# Patient Record
Sex: FEMALE | Race: BLACK OR AFRICAN AMERICAN | Employment: FULL TIME | URBAN - METROPOLITAN AREA
[De-identification: names, ages, dates, MRNs, and addresses within clinical notes are randomized per-mention and may not be internally consistent; named-entity substitution may affect disease eponyms.]

---

## 2017-07-29 ENCOUNTER — HOSPITAL ENCOUNTER (EMERGENCY)
Facility: CLINIC | Age: 27
Discharge: HOME | End: 2017-07-29
Attending: EMERGENCY MEDICINE | Admitting: EMERGENCY MEDICINE
Payer: COMMERCIAL

## 2017-07-29 VITALS
DIASTOLIC BLOOD PRESSURE: 82 MMHG | HEIGHT: 62 IN | SYSTOLIC BLOOD PRESSURE: 129 MMHG | BODY MASS INDEX: 30.55 KG/M2 | RESPIRATION RATE: 18 BRPM | HEART RATE: 69 BPM | WEIGHT: 166 LBS | OXYGEN SATURATION: 97 % | TEMPERATURE: 98.5 F

## 2017-07-29 DIAGNOSIS — G51.0 BELL'S PALSY: ICD-10-CM

## 2017-07-29 DIAGNOSIS — G44.219 EPISODIC TENSION-TYPE HEADACHE, NOT INTRACTABLE: ICD-10-CM

## 2017-07-29 LAB
ANION GAP SERPL CALCULATED.3IONS-SCNC: 7 MMOL/L (ref 3–14)
BASOPHILS # BLD AUTO: 0 10E9/L (ref 0–0.2)
BASOPHILS NFR BLD AUTO: 0.9 %
BUN SERPL-MCNC: 10 MG/DL (ref 7–30)
CALCIUM SERPL-MCNC: 8.8 MG/DL (ref 8.5–10.1)
CHLORIDE SERPL-SCNC: 107 MMOL/L (ref 94–109)
CO2 SERPL-SCNC: 26 MMOL/L (ref 20–32)
CREAT SERPL-MCNC: 0.4 MG/DL (ref 0.52–1.04)
DIFFERENTIAL METHOD BLD: NORMAL
EOSINOPHIL # BLD AUTO: 0.1 10E9/L (ref 0–0.7)
EOSINOPHIL NFR BLD AUTO: 1.8 %
ERYTHROCYTE [DISTWIDTH] IN BLOOD BY AUTOMATED COUNT: 13 % (ref 10–15)
GFR SERPL CREATININE-BSD FRML MDRD: ABNORMAL ML/MIN/1.7M2
GLUCOSE SERPL-MCNC: 105 MG/DL (ref 70–99)
HCT VFR BLD AUTO: 37.4 % (ref 35–47)
HGB BLD-MCNC: 12.4 G/DL (ref 11.7–15.7)
IMM GRANULOCYTES # BLD: 0 10E9/L (ref 0–0.4)
IMM GRANULOCYTES NFR BLD: 0.2 %
LYMPHOCYTES # BLD AUTO: 1.7 10E9/L (ref 0.8–5.3)
LYMPHOCYTES NFR BLD AUTO: 37.3 %
MAGNESIUM SERPL-MCNC: 1.9 MG/DL (ref 1.6–2.3)
MCH RBC QN AUTO: 28 PG (ref 26.5–33)
MCHC RBC AUTO-ENTMCNC: 33.2 G/DL (ref 31.5–36.5)
MCV RBC AUTO: 84 FL (ref 78–100)
MONOCYTES # BLD AUTO: 0.2 10E9/L (ref 0–1.3)
MONOCYTES NFR BLD AUTO: 4.2 %
NEUTROPHILS # BLD AUTO: 2.5 10E9/L (ref 1.6–8.3)
NEUTROPHILS NFR BLD AUTO: 55.6 %
NRBC # BLD AUTO: 0 10*3/UL
NRBC BLD AUTO-RTO: 0 /100
PLATELET # BLD AUTO: 198 10E9/L (ref 150–450)
POTASSIUM SERPL-SCNC: 3.6 MMOL/L (ref 3.4–5.3)
RBC # BLD AUTO: 4.43 10E12/L (ref 3.8–5.2)
SODIUM SERPL-SCNC: 140 MMOL/L (ref 133–144)
WBC # BLD AUTO: 4.5 10E9/L (ref 4–11)

## 2017-07-29 PROCEDURE — 99283 EMERGENCY DEPT VISIT LOW MDM: CPT

## 2017-07-29 PROCEDURE — 36415 COLL VENOUS BLD VENIPUNCTURE: CPT | Performed by: EMERGENCY MEDICINE

## 2017-07-29 PROCEDURE — 80048 BASIC METABOLIC PNL TOTAL CA: CPT | Performed by: EMERGENCY MEDICINE

## 2017-07-29 PROCEDURE — 85025 COMPLETE CBC W/AUTO DIFF WBC: CPT | Performed by: EMERGENCY MEDICINE

## 2017-07-29 PROCEDURE — 83735 ASSAY OF MAGNESIUM: CPT | Performed by: EMERGENCY MEDICINE

## 2017-07-29 RX ORDER — VALACYCLOVIR HYDROCHLORIDE 1 G/1
1000 TABLET, FILM COATED ORAL 3 TIMES DAILY
Qty: 14 TABLET | Refills: 0 | Status: SHIPPED | OUTPATIENT
Start: 2017-07-29 | End: 2017-11-15

## 2017-07-29 RX ORDER — VALACYCLOVIR HYDROCHLORIDE 1 G/1
1000 TABLET, FILM COATED ORAL 3 TIMES DAILY
Qty: 7 TABLET | Refills: 0 | Status: SHIPPED | OUTPATIENT
Start: 2017-07-29 | End: 2017-07-30

## 2017-07-29 RX ORDER — KETOROLAC TROMETHAMINE 30 MG/ML
30 INJECTION, SOLUTION INTRAMUSCULAR; INTRAVENOUS ONCE
Status: DISCONTINUED | OUTPATIENT
Start: 2017-07-29 | End: 2017-07-29 | Stop reason: HOSPADM

## 2017-07-29 RX ORDER — PREDNISONE 10 MG/1
TABLET ORAL
Qty: 10 TABLET | Refills: 0 | Status: SHIPPED | OUTPATIENT
Start: 2017-08-04 | End: 2017-08-07

## 2017-07-29 RX ORDER — PREDNISONE 20 MG/1
TABLET ORAL
Qty: 15 TABLET | Refills: 0 | Status: SHIPPED | OUTPATIENT
Start: 2017-07-30 | End: 2017-08-03

## 2017-07-29 ASSESSMENT — ENCOUNTER SYMPTOMS
CONFUSION: 0
TROUBLE SWALLOWING: 0
BACK PAIN: 0
LIGHT-HEADEDNESS: 0
MYALGIAS: 1
NUMBNESS: 1
NECK STIFFNESS: 0
WEAKNESS: 1
NECK PAIN: 1
COUGH: 0
FACIAL SWELLING: 0
VOMITING: 0
SLEEP DISTURBANCE: 1
APPETITE CHANGE: 0
ABDOMINAL PAIN: 0
EYE REDNESS: 0
SORE THROAT: 0
EYE DISCHARGE: 1
EYE ITCHING: 0
DIARRHEA: 0
FEVER: 0
DIAPHORESIS: 0
NAUSEA: 1
HEADACHES: 1
FATIGUE: 1
DIZZINESS: 0
RHINORRHEA: 0
CHILLS: 0
EYE PAIN: 1
SINUS PRESSURE: 1

## 2017-07-29 NOTE — ED NOTES
A/O. VSS.Pt verbalized understanding of d/c instructions and ambulated to lobby steady gait.   Script rx prednisonex2 and valtrex given to pt at time of d/c

## 2017-07-29 NOTE — ED PROVIDER NOTES
History     Chief Complaint:  Right sided facial droop and headache    The history is provided by the patient.      Agustin Ortega is a 26 year old female with a history of hyperlipidemia who presents to the ED for right sided headache and facial droop. The patient states that she has been having a headache and generalized weakness for the past 2 days. She feels the headache once in a while on the left side but that it comes and goes and is not nearly as painful as the right side. The pain started in her head and now has radiated down her neck and into her right shoulder. With this, she saw her primary care doctor and was told she had a tension headache and since has been taking Ibuprofen and icing/heating the area to reduce pain though she notes it has not taken the headache away. She was unable to sleep last night due to the right shoulder pain and had to prop her arm up with a pillow while she was in bed. This morning, she noticed bilateral eye discharge. Then while at work, she started to become unable to fully open the right side of her mouth. Here in the ED, she has some numbness in her right check and mouth but is still able to feel touch. When applying pressure to the affected area, the pain will decrease for the time being but does not completely go away. She also notes that when she presses on the back of her head, she sometimes experiences pressure that radiates into her sinuses. Standing or walking around increases the amount of pain she experiences. She has been feeling nauseous and notes a mouth sore that was red and painful to touch that resolved about 3 days ago. She denies any confusion, coordination problems, gait difficulty, speech problems, fever, vomiting, or any other symptoms.    Allergies:  No known drug allergies    Medications:    Naprosyn  Prilosec  Vitamin E  Zofran  Omeprazole  Multiple Vitamins-Minerals  [place]-3 Fish Oil  Ibuprofen     Past Medical History:    GERD  "  Hyperlipidemia    Past Surgical History:    History reviewed. No pertinent surgical history.    Family History:    Diabetes  Hypertension    Social History:  Smoking status: Never  Alcohol use: No  Marital Status: Single      Review of Systems   Constitutional: Positive for fatigue. Negative for appetite change, chills, diaphoresis and fever.   HENT: Positive for mouth sores (resolved) and sinus pressure. Negative for congestion, ear pain, facial swelling, rhinorrhea, sore throat and trouble swallowing.    Eyes: Positive for pain and discharge. Negative for redness, itching and visual disturbance.   Respiratory: Negative for cough.    Gastrointestinal: Positive for nausea. Negative for abdominal pain, diarrhea and vomiting.   Musculoskeletal: Positive for myalgias (right shoulder) and neck pain. Negative for back pain, gait problem and neck stiffness.   Neurological: Positive for weakness, numbness (right side) and headaches. Negative for dizziness and light-headedness.   Psychiatric/Behavioral: Positive for sleep disturbance. Negative for confusion.     Physical Exam   Patient Vitals for the past 24 hrs:   BP Temp Temp src Pulse Resp SpO2 Height Weight   07/29/17 1530 (!) 132/93 - - - - 99 % - -   07/29/17 1400 - - - - - 99 % - -   07/29/17 1351 120/83 98.5  F (36.9  C) Oral 69 18 100 % 1.575 m (5' 2\") 75.3 kg (166 lb)     Physical Exam  Constitutional: Patient appears well-developed and well-nourished.   HENT:   Head: No external signs of trauma noted.  Eyes: Pupils are equal, round, and reactive to light.   Neck: FROM. Negative Spurling's test  Cardiovascular: Normal rate, regular rhythm, normal heart sounds and intact distal pulses.    Pulmonary/Chest: Effort normal and breath sounds normal. No respiratory distress. No wheezes noted.   Abdominal: Soft. There is no tenderness. There is no rebound.   Neurological:    Patient is alert and oriented to person, place, and time.    Speech is fluent, cognition is " normal.   CN 2-12 intact except for right smile asymmetry, difficulty closing and opening the right eyelid, an unequal right forehead raise, and difficulty puffing out her right cheek.    Otherwise, PERRL, EOMI, normal and equal sensation to bilateral forehead/cheek/chin, equal hearing to finger rub, midline tongue protrusion with nl side-to-side movement, normal shoulder shrug.    RUE strength 5/5: , finger abd, wrist flex/ext, elbow flex/ext.    LUE strength 5/5: , finger abd, wrist flex/ext, elbow flex/ext.    RLE strength 5/5: ankle flex/ext, knee flex/ext, hip flex.    LLE strength 5/5: ankle flex/ext, knee flex/ext, hip flex.    Sensation equal in all 4 extremities.    No arm drift.     Cerebellar: Normal rapid alternating movements     ( finger-nose-finger, rapid pronation/supination, hand rolling)    Normal heel-to-shin   Normal gait.    No meningeal signs.  Skin: Skin is warm and dry.     Emergency Department Course     Laboratory:  BMP: Glucose 105 (H), Creatinine 0.40 (L) o/w WNL   CBC: WNL (WBC 4.5, HGB 12.4, )   Magnesium: 1.9    Emergency Department Course:  Past medical records, nursing notes, and vitals reviewed.  1357: I performed an exam of the patient and obtained history, as documented above.    IV inserted and blood drawn. The patient was placed on continuous cardiac monitoring and pulse oximetry.    1554: I rechecked the patient. Findings and plan explained to the Patient and significant other. Patient discharged home with instructions regarding supportive care, medications, and reasons to return. The importance of close follow-up was reviewed.     Impression & Plan      Medical Decision Making:  Agustin Ortega is a 26 year old female in room 22. Patient presents to the ED for evaluation of right facial weakness as well as a headache. I found her to have a right-sided Bell's palsy and likely a right tension headache. There was definite discomfort at the origin of her right  greater occipital nerve. There were no other worrisome neuro deficits outside of what I would expect with Bell's palsy. 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 She is not sure if she got any in her mouth but she did notice a left sided tongue lesion for a couple of days, though that went away rapidly. She has not noticed any other HSV type lesions. At this time I believe she is stable for discharge but will send her home with steroids and valacyclovir and I encourage her to follow up with her primary care provider. Anticipatory guidance given prior to discharge.    Diagnosis:    ICD-10-CM    1. Episodic tension-type headache, not intractable G44.219    2. Bell's palsy G51.0        Disposition:  discharged to home    Discharge Medications:  New Prescriptions    PREDNISONE (DELTASONE) 10 MG TABLET    Take 4 tablets on day one, then 3 tablets on day 2, then 2 tablets on day 3, then 1 tablet on day 4.    PREDNISONE (DELTASONE) 20 MG TABLET    Take three tablets (= 60mg) each day for 5 (five) days    VALACYCLOVIR (VALTREX) 1000 MG TABLET    Take 1 tablet (1,000 mg) by mouth 3 times daily for 1 day         Sarai Mckeon  7/29/2017   [facility] EMERGENCY DEPARTMENT  I, Sarai Mckeon, am serving as a scribe at 1:57 PM on 7/29/2017 to document services personally performed by Jah Marmolejo DO based on my observations and the provider's statements to me.        Jah Marmolejo DO  07/29/17 0991

## 2017-07-29 NOTE — DISCHARGE INSTRUCTIONS
Leung s Palsy    Bell's Palsy is a problem involving the nerve that controls the muscles on one side of the face.  The cause is unknown, but may be related to inflammation of the nerve. Symptoms usually appear only on the affected side. They may include:    Inability to close the eyelid    Tearing of the eye    Facial drooping    Drooling    Numbness or pain    Changes in taste    Sound sensitivity  Damage to the eye can be a serious problem. The inability to blink can cause the eye to dry out. An ulcer (sore) can then form on the cornea. Also, not blinking means that the eye has no protection from dirt and dust particles.  Treatment involves protecting and moistening the eye. Medicines may also help.  Most persons recover completely within 3 to 6 months. However, the condition sometimes returns months or years later.  Home care    Get plenty of rest and eat a healthy diet to help yourself recover.    Use Artificial Tears frequently during the day and at bedtime to prevent drying. These drops are available without prescription at your drug store.    Wear protective glasses especially when outside to protect from flying debris. Use sunglasses when outdoors.    Tape the eyelid closed at bedtime with a paper tape (available at your pharmacy). This has a very mild adhesive to avoid injury to the lid. This will protect your eye from injury while you sleep.    Sometimes medicines are prescribed to reduce inflammation or treat specific viral infections of the nerve. If medicines are prescribed, take them exactly as directed. Usually there is a 10-day course of medication that is started as soon as possible. Taking this medicine as prescribed will help with a full recovery.    Use low heat, for example from a heating pad, on the affected area. This can help reduce pain and swelling.    If you are experiencing sever pain, contact your health care provider.  Follow-up care  Follow up with your healthcare provider as advised.  "If you referred to a specialist, make that appointment promptly.  When to seek medical advice  Call your healthcare provider if any of the following occur:    Severe eye redness    Eye pain    Thick drainage from the eye    Change in vision (such as double vision or losing vision)    Fever over 100.4 F (38 C) or as directed by your healthcare provider    Headache, neck pain, weakness, trouble speaking or walking, or other unexplained symptoms  Date Last Reviewed: 8/23/2015 2000-2017 [employer]. [address] [place] [address]. All rights reserved. This information is not intended as a substitute for professional medical care. Always follow your healthcare professional's instructions.          * Tension Headache    Muscle Tension Headache (also called \"stress headache\") is a very common cause of head pain. Under stress, some people tense the muscles of their shoulder, neck and scalp without knowing it. If this lasts long enough, a headache can occur. These headaches can be very painful and last for hours or even days.  Home Care:    If you were given pain medicine for this headache, do not drive yourself home. Arrange for a ride, instead. When you get home, try to sleep. You should feel much better when you wake up.    Heat to the back of your neck may relieve neck spasm.    Drink only clear liquids or eat a very light diet to avoid nausea/vomiting until symptoms improve.  Preventing Future Headaches    Identify the sources of stress in your life. These may not be obvious! Learn new ways to handle your stress, such as regular exercise, biofeedback, self-hypnosis and meditation. For more information about this, consult your doctor or go to a local bookstore and review the many books and tapes on this subject.    At the first sign of a tension headache, take time out if possible. Remove yourself from the stressful situation, find a quiet comfortable place to sit or lie down and let yourself " relax. Heat and deep massage of the tight areas in the neck and shoulders may help reduce muscle spasm. Medicine, such as ibuprofen (Advil or Motrin) or a prescribed muscle relaxant may be helpful at this point.  Follow Up with your doctor if the headache is not better within the next 24 hours. If you have frequent headaches you should discuss a treatment plan with your primary care doctor. Ask if you can have medicine to take at home the next time you get a bad headache. This may avoid the need for a visit to the emergency department in the future. Poorly controlled chronic headaches may require a referral to a neurologist (headache specialist).  Call your Doctor Or Get Prompt Medical Attention if any of the following occur:    Worsening of your head pain or no improvement within 24 hours    Repeated vomiting (unable to keep liquids down)    Fever of 100.4 F (38.0 C) or higher, or as directed by your healthcare provider    Stiff neck    Extreme drowsiness, confusion or fainting    Dizziness, vertigo (dizziness with spinning sensation)    Weakness of an arm or leg or one side of the face    Difficulty with speech or vision    [id number] [facility], [address] [place] [address], [place] [address]. All rights reserved. This information is not intended as a substitute for professional medical care. Always follow your healthcare professional's instructions.

## 2017-07-29 NOTE — ED AVS SNAPSHOT
[facility] Emergency Department    [address]    [facility] [address]    Phone:  [phone]    Fax:  [phone]                                       Agustin Ortega   MRN: [id number]    Department:  [facility] Emergency Department   Date of Visit:  7/29/2017           Patient Information     Date Of Birth          1990        Your diagnoses for this visit were:     Episodic tension-type headache, not intractable     Bell's palsy        You were seen by Jah Marmolejo DO.      Follow-up Information     Follow up with Kayla Becker APRN CNP. Call in 2 days.    Specialty:  Nurse Practitioner - Family    Why:  As needed    Contact information:    [facility]  [address]  [facility] [address] [phone]          Follow up with [facility] Emergency [facility].    Specialty:  EMERGENCY MEDICINE    Why:  If symptoms worsen    Contact information:    [address]  [facility] [address] [phone]        Discharge Instructions         Leung s Palsy    Bell's Palsy is a problem involving the nerve that controls the muscles on one side of the face.  The cause is unknown, but may be related to inflammation of the nerve. Symptoms usually appear only on the affected side. They may include:    Inability to close the eyelid    Tearing of the eye    Facial drooping    Drooling    Numbness or pain    Changes in taste    Sound sensitivity  Damage to the eye can be a serious problem. The inability to blink can cause the eye to dry out. An ulcer (sore) can then form on the cornea. Also, not blinking means that the eye has no protection from dirt and dust particles.  Treatment involves protecting and moistening the eye. Medicines may also help.  Most persons recover completely within 3 to 6 months. However, the condition sometimes returns months or years later.  Home care    Get plenty of rest and eat a healthy diet to  "help yourself recover.    Use Artificial Tears frequently during the day and at bedtime to prevent drying. These drops are available without prescription at your drug store.    Wear protective glasses especially when outside to protect from flying debris. Use sunglasses when outdoors.    Tape the eyelid closed at bedtime with a paper tape (available at your pharmacy). This has a very mild adhesive to avoid injury to the lid. This will protect your eye from injury while you sleep.    Sometimes medicines are prescribed to reduce inflammation or treat specific viral infections of the nerve. If medicines are prescribed, take them exactly as directed. Usually there is a 10-day course of medication that is started as soon as possible. Taking this medicine as prescribed will help with a full recovery.    Use low heat, for example from a heating pad, on the affected area. This can help reduce pain and swelling.    If you are experiencing sever pain, contact your health care provider.  Follow-up care  Follow up with your healthcare provider as advised. If you referred to a specialist, make that appointment promptly.  When to seek medical advice  Call your healthcare provider if any of the following occur:    Severe eye redness    Eye pain    Thick drainage from the eye    Change in vision (such as double vision or losing vision)    Fever over 100.4 F (38 C) or as directed by your healthcare provider    Headache, neck pain, weakness, trouble speaking or walking, or other unexplained symptoms  Date Last Reviewed: 8/23/2015 2000-2017 [employer]. [address] [place] [address]. All rights reserved. This information is not intended as a substitute for professional medical care. Always follow your healthcare professional's instructions.          * Tension Headache    Muscle Tension Headache (also called \"stress headache\") is a very common cause of head pain. Under stress, some people tense the muscles " of their shoulder, neck and scalp without knowing it. If this lasts long enough, a headache can occur. These headaches can be very painful and last for hours or even days.  Home Care:    If you were given pain medicine for this headache, do not drive yourself home. Arrange for a ride, instead. When you get home, try to sleep. You should feel much better when you wake up.    Heat to the back of your neck may relieve neck spasm.    Drink only clear liquids or eat a very light diet to avoid nausea/vomiting until symptoms improve.  Preventing Future Headaches    Identify the sources of stress in your life. These may not be obvious! Learn new ways to handle your stress, such as regular exercise, biofeedback, self-hypnosis and meditation. For more information about this, consult your doctor or go to a local bookstore and review the many books and tapes on this subject.    At the first sign of a tension headache, take time out if possible. Remove yourself from the stressful situation, find a quiet comfortable place to sit or lie down and let yourself relax. Heat and deep massage of the tight areas in the neck and shoulders may help reduce muscle spasm. Medicine, such as ibuprofen (Advil or Motrin) or a prescribed muscle relaxant may be helpful at this point.  Follow Up with your doctor if the headache is not better within the next 24 hours. If you have frequent headaches you should discuss a treatment plan with your primary care doctor. Ask if you can have medicine to take at home the next time you get a bad headache. This may avoid the need for a visit to the emergency department in the future. Poorly controlled chronic headaches may require a referral to a neurologist (headache specialist).  Call your Doctor Or Get Prompt Medical Attention if any of the following occur:    Worsening of your head pain or no improvement within 24 hours    Repeated vomiting (unable to keep liquids down)    Fever of 100.4 F (38.0 C) or  higher, or as directed by your healthcare provider    Stiff neck    Extreme drowsiness, confusion or fainting    Dizziness, vertigo (dizziness with spinning sensation)    Weakness of an arm or leg or one side of the face    Difficulty with speech or vision    4623-4181 [facility], [address] [place] [address]. All rights reserved. This information is not intended as a substitute for professional medical care. Always follow your healthcare professional's instructions.          24 Hour Appointment Hotline       To make an appointment at any [facility], call [phone] ([phone]). If you don't have a family doctor or clinic, we will help you find one. [facility] are conveniently located to serve the needs of you and your family.             Review of your medicines      START taking        Dose / Directions Last dose taken    * predniSONE 20 MG tablet   Commonly known as:  DELTASONE   Quantity:  15 tablet   Start taking on:  7/30/2017        Take three tablets (= 60mg) each day for 5 (five) days   Refills:  0        * predniSONE 10 MG tablet   Commonly known as:  DELTASONE   Quantity:  10 tablet   Start taking on:  8/4/2017        Take 4 tablets on day one, then 3 tablets on day 2, then 2 tablets on day 3, then 1 tablet on day 4.   Refills:  0        valACYclovir 1000 mg tablet   Commonly known as:  VALTREX   Dose:  1000 mg   Quantity:  7 tablet        Take 1 tablet (1,000 mg) by mouth 3 times daily for 1 day   Refills:  0        * Notice:  This list has 2 medication(s) that are the same as other medications prescribed for you. Read the directions carefully, and ask your doctor or other care provider to review them with you.      Our records show that you are taking the medicines listed below. If these are incorrect, please call your family doctor or clinic.        Dose / Directions Last dose taken    IBUPROFEN PO        Refills:  0        MULTIVITAL PO        Refills:  0         naproxen 500 MG tablet   Commonly known as:  NAPROSYN   Dose:  500 mg   Quantity:  30 tablet        Take 1 tablet (500 mg) by mouth 2 times daily as needed for moderate pain   Refills:  1        OMEGA-3 FISH OIL PO   Dose:  1 g        Take 1 g by mouth daily   Refills:  0        VITAMIN E PO   Dose:  1 tablet        Take 1 tablet by mouth daily   Refills:  0                Prescriptions were sent or printed at these locations (3 Prescriptions)                   Other Prescriptions                Printed at Department/Unit printer (3 of 3)         predniSONE (DELTASONE) 20 MG tablet               predniSONE (DELTASONE) 10 MG tablet               valACYclovir (VALTREX) 1000 mg tablet                Procedures and tests performed during your visit     Basic metabolic panel    CBC with platelets differential    Magnesium    Peripheral IV catheter      Orders Needing Specimen Collection     None      Pending Results     No orders found from 7/27/2017 to 7/30/2017.            Pending Culture Results     No orders found from 7/27/2017 to 7/30/2017.            Pending Results Instructions     If you had any lab results that were not finalized at the time of your Discharge, you can call the ED Lab Result RN at [phone]. You will be contacted by this team for any positive Lab results or changes in treatment. The nurses are available 7 days a week from 10A to 6:30P.  You can leave a message 24 hours per day and they will return your call.        Test Results From Your Hospital Stay        7/29/2017  3:13 PM      Component Results     Component Value Ref Range & Units Status    Sodium 140 133 - 144 mmol/L Final    Potassium 3.6 3.4 - 5.3 mmol/L Final    Chloride 107 94 - 109 mmol/L Final    Carbon Dioxide 26 20 - 32 mmol/L Final    Anion Gap 7 3 - 14 mmol/L Final    Glucose 105 (H) 70 - 99 mg/dL Final    Urea Nitrogen 10 7 - 30 mg/dL Final    Creatinine 0.40 (L) 0.52 - 1.04 mg/dL Final    GFR Estimate >90  Non African  American GFR Calc   >60 mL/min/1.7m2 Final    GFR Estimate If Black >90   GFR Calc   >60 mL/min/1.7m2 Final    Calcium 8.8 8.5 - 10.1 mg/dL Final         7/29/2017  3:00 PM      Component Results     Component Value Ref Range & Units Status    WBC 4.5 4.0 - 11.0 10e9/L Final    RBC Count 4.43 3.8 - 5.2 10e12/L Final    Hemoglobin 12.4 11.7 - 15.7 g/dL Final    Hematocrit 37.4 35.0 - 47.0 % Final    MCV 84 78 - 100 fl Final    MCH 28.0 26.5 - 33.0 pg Final    MCHC 33.2 31.5 - 36.5 g/dL Final    RDW 13.0 10.0 - 15.0 % Final    Platelet Count 198 150 - 450 10e9/L Final    Diff Method Automated Method  Final    % Neutrophils 55.6 % Final    % Lymphocytes 37.3 % Final    % Monocytes 4.2 % Final    % Eosinophils 1.8 % Final    % Basophils 0.9 % Final    % Immature Granulocytes 0.2 % Final    Nucleated RBCs 0 0 /100 Final    Absolute Neutrophil 2.5 1.6 - 8.3 10e9/L Final    Absolute Lymphocytes 1.7 0.8 - 5.3 10e9/L Final    Absolute Monocytes 0.2 0.0 - 1.3 10e9/L Final    Absolute Eosinophils 0.1 0.0 - 0.7 10e9/L Final    Absolute Basophils 0.0 0.0 - 0.2 10e9/L Final    Abs Immature Granulocytes 0.0 0 - 0.4 10e9/L Final    Absolute Nucleated RBC 0.0  Final         7/29/2017  3:13 PM      Component Results     Component Value Ref Range & Units Status    Magnesium 1.9 1.6 - 2.3 mg/dL Final                Clinical Quality Measure: Blood Pressure Screening     Your blood pressure was checked while you were in the emergency department today. The last reading we obtained was  BP: (!) 132/93 . Please read the guidelines below about what these numbers mean and what you should do about them.  If your systolic blood pressure (the top number) is less than 120 and your diastolic blood pressure (the bottom number) is less than 80, then your blood pressure is normal. There is nothing more that you need to do about it.  If your systolic blood pressure (the top number) is 120-139 or your diastolic blood pressure (the  bottom number) is 80-89, your blood pressure may be higher than it should be. You should have your blood pressure rechecked within a year by a primary care provider.  If your systolic blood pressure (the top number) is 140 or greater or your diastolic blood pressure (the bottom number) is 90 or greater, you may have high blood pressure. High blood pressure is treatable, but if left untreated over time it can put you at risk for heart attack, stroke, or kidney failure. You should have your blood pressure rechecked by a primary care provider within the next 4 weeks.  If your provider in the emergency department today gave you specific instructions to follow-up with your doctor or provider even sooner than that, you should follow that instruction and not wait for up to 4 weeks for your follow-up visit.        Thank you for choosing [facility]       Thank you for choosing [facility] for your care. Our goal is always to provide you with excellent care. Hearing back from our patients is one way we can continue to improve our services. Please take a few minutes to complete the written survey that you may receive in the mail after you visit with us. Thank you!        Care Team Connect Information     Care Team Connect gives you secure access to your electronic health record. If you see a primary care provider, you can also send messages to your care team and make appointments. If you have questions, please call your primary care clinic.  If you do not have a primary care provider, please call [phone] and they will assist you.        Care EveryWhere ID     This is your Care EveryWhere ID. This could be used by other organizations to access your [facility] medical records  [id number]        Equal Access to Services     LIZET VALLES : Hadii azra Valverde, waefrain casey, qaybta solomon diaz . So [facility] [phone].    ATENCIÓN: Si habla español, tiene a sheets disposición servicios gratuitos  de asistencia lingüística. Sonja schaffer [phone].    We comply with applicable federal civil rights laws and [place] laws. We do not discriminate on the basis of race, color, national origin, age, disability sex, sexual orientation or gender identity.            After Visit Summary       This is your record. Keep this with you and show to your community pharmacist(s) and doctor(s) at your next visit.

## 2017-07-29 NOTE — ED AVS SNAPSHOT
[facility] Emergency Department    [address]    [facility] [address]    Phone:  [phone]    Fax:  [phone]                                       Agustin Ortega   MRN: [id number]    Department:  [facility] Emergency Department   Date of Visit:  7/29/2017           After Visit Summary Signature Page     I have received my discharge instructions, and my questions have been answered. I have discussed any challenges I see with this plan with the nurse or doctor.    ..........................................................................................................................................  Patient/Patient Representative Signature      ..........................................................................................................................................  Patient Representative Print Name and Relationship to Patient    ..................................................               ................................................  Date                                            Time    ..........................................................................................................................................  Reviewed by Signature/Title    ...................................................              ..............................................  Date                                                            Time

## 2017-07-30 NOTE — ED PROVIDER NOTES
Entered chart to write a rx.    Per nurse and chart review: Francie VARELA wrote 1000mg TID for 1 day Valtrex ordered, 7 tablets.   I wrote rx Valtrex 14 tablets 1000 mg printed for patient to  from here.  Patient was called and updated.     Marisol Nascimento, APRN CNP  07/29/17 1944

## 2017-07-30 NOTE — ED NOTES
Pt called to verify Rx written by Francie VARELA. 1000mg TID for 1 day Valtrex ordered, 7 tablets. Forwarded information to Azam LARA to clarify. Azam NP will write rx Valtrex 14 tablets 1000 mg each sent to [facility] for pt. Pt updated on phone.

## 2017-11-02 DIAGNOSIS — R53.83 FATIGUE, UNSPECIFIED TYPE: Primary | ICD-10-CM

## 2017-11-02 DIAGNOSIS — R39.15 URINARY URGENCY: ICD-10-CM

## 2017-11-02 LAB
ALBUMIN UR-MCNC: NEGATIVE MG/DL
APPEARANCE UR: CLEAR
BILIRUB UR QL STRIP: NEGATIVE
COLOR UR AUTO: YELLOW
ERYTHROCYTE [DISTWIDTH] IN BLOOD BY AUTOMATED COUNT: 13 % (ref 10–15)
GLUCOSE UR STRIP-MCNC: NEGATIVE MG/DL
HCT VFR BLD AUTO: 38.4 % (ref 35–47)
HGB BLD-MCNC: 12.6 G/DL (ref 11.7–15.7)
HGB UR QL STRIP: NEGATIVE
KETONES UR STRIP-MCNC: NEGATIVE MG/DL
LEUKOCYTE ESTERASE UR QL STRIP: NEGATIVE
MCH RBC QN AUTO: 27.9 PG (ref 26.5–33)
MCHC RBC AUTO-ENTMCNC: 32.8 G/DL (ref 31.5–36.5)
MCV RBC AUTO: 85 FL (ref 78–100)
NITRATE UR QL: NEGATIVE
PH UR STRIP: 6 PH (ref 5–7)
PLATELET # BLD AUTO: 223 10E9/L (ref 150–450)
RBC # BLD AUTO: 4.51 10E12/L (ref 3.8–5.2)
RBC #/AREA URNS AUTO: NORMAL /HPF
SOURCE: NORMAL
SP GR UR STRIP: 1.01 (ref 1–1.03)
TSH SERPL DL<=0.005 MIU/L-ACNC: 1.58 MU/L (ref 0.4–4)
UROBILINOGEN UR STRIP-ACNC: 0.2 EU/DL (ref 0.2–1)
WBC # BLD AUTO: 5.6 10E9/L (ref 4–11)
WBC #/AREA URNS AUTO: NORMAL /HPF

## 2017-11-02 PROCEDURE — 81001 URINALYSIS AUTO W/SCOPE: CPT | Performed by: INTERNAL MEDICINE

## 2017-11-02 PROCEDURE — 84443 ASSAY THYROID STIM HORMONE: CPT | Performed by: INTERNAL MEDICINE

## 2017-11-02 PROCEDURE — 36415 COLL VENOUS BLD VENIPUNCTURE: CPT | Performed by: INTERNAL MEDICINE

## 2017-11-02 PROCEDURE — 82306 VITAMIN D 25 HYDROXY: CPT | Performed by: INTERNAL MEDICINE

## 2017-11-02 PROCEDURE — 85027 COMPLETE CBC AUTOMATED: CPT | Performed by: INTERNAL MEDICINE

## 2017-11-03 LAB — DEPRECATED CALCIDIOL+CALCIFEROL SERPL-MC: 24 UG/L (ref 20–75)

## 2017-11-15 ENCOUNTER — OFFICE VISIT (OUTPATIENT)
Dept: URGENT CARE | Facility: URGENT CARE | Age: 27
End: 2017-11-15
Payer: COMMERCIAL

## 2017-11-15 VITALS
DIASTOLIC BLOOD PRESSURE: 70 MMHG | SYSTOLIC BLOOD PRESSURE: 118 MMHG | OXYGEN SATURATION: 99 % | TEMPERATURE: 98.1 F | HEART RATE: 89 BPM

## 2017-11-15 DIAGNOSIS — M79.10 MYALGIA: Primary | ICD-10-CM

## 2017-11-15 DIAGNOSIS — N92.6 IRREGULAR MENSES: ICD-10-CM

## 2017-11-15 PROCEDURE — 86039 ANTINUCLEAR ANTIBODIES (ANA): CPT | Mod: 59 | Performed by: FAMILY MEDICINE

## 2017-11-15 PROCEDURE — 99213 OFFICE O/P EST LOW 20 MIN: CPT | Performed by: FAMILY MEDICINE

## 2017-11-15 PROCEDURE — 36415 COLL VENOUS BLD VENIPUNCTURE: CPT | Performed by: FAMILY MEDICINE

## 2017-11-15 PROCEDURE — 86431 RHEUMATOID FACTOR QUANT: CPT | Performed by: FAMILY MEDICINE

## 2017-11-15 PROCEDURE — 86618 LYME DISEASE ANTIBODY: CPT | Performed by: FAMILY MEDICINE

## 2017-11-15 PROCEDURE — 86038 ANTINUCLEAR ANTIBODIES: CPT | Performed by: FAMILY MEDICINE

## 2017-11-15 PROCEDURE — 84702 CHORIONIC GONADOTROPIN TEST: CPT | Performed by: FAMILY MEDICINE

## 2017-11-15 RX ORDER — CHLORAL HYDRATE 500 MG
CAPSULE ORAL
COMMUNITY
Start: 2016-02-11 | End: 2018-12-13

## 2017-11-15 RX ORDER — MULTIVITAMIN,THER AND MINERALS
TABLET ORAL
COMMUNITY
Start: 2016-02-11 | End: 2017-11-15

## 2017-11-15 NOTE — MR AVS SNAPSHOT
After Visit Summary   11/15/2017    Agustin Ortega    MRN: [id number]           Patient Information     Date Of Birth          1990        Visit Information        Provider Department      11/15/2017 5:10 PM Nehemiah Lala MD [facility]        Today's Diagnoses     Myalgia    -  1    Irregular menses          Care Instructions    Okay for tylenol for discomfort.      Myalgias  Myalgias are another word for muscle aches and soreness. This is a symptom, not a disease. Myalgias can have many causes. A cold, the flu, or an acute infection can cause them. So can any illness with a high fever. They may happen after exertion (such as heavy exercise) or injury (such as an accident or fall). Some medicines (such as statins and certain antidepressants) can cause myalgias. They can also be a symptom of chronic or ongoing medical problems (such as lupus, chronic fatigue, or hypothyroidism). With these illnesses, other serious symptoms often occur in addition to muscle pain and soreness.    Myalgias most often go away on their own. If they don't go away, come back, or are severe, testing may be needed to help find the cause.  Home care    Rest until you feel better.    Follow instructions that you were given for how to care for yourself. This may depend on the cause of your myalgias.     If myalgia is thought to be due to a medicine, be sure to talk to the doctor that prescribed the medicine about the best course of action.    To control pain, take prescription or over-the-counter medicines as directed. Unless told not to, you can try acetaminophen or ibuprofen.  Follow-up care  Follow up with your healthcare provider or as advised. If your symptoms do not go away in a few days or if they come back, follow up with your healthcare provider for an exam and testing.  When to see medical advice  Call your healthcare provider for any of the following:    Fever of 100.4 F (38 C) or higher, or as  directed by your healthcare provider    Pain that gets worse and not better, or that goes away and comes back    New joint pains    New rash    Severe headache, neck pain, drowsiness, or confusion  Date Last Reviewed: 3/1/2017    5633-2773 [employer]. [address] [place] [address]. All rights reserved. This information is not intended as a substitute for professional medical care. Always follow your healthcare professional's instructions.        GERD (Adult)    The esophagus is a tube that carries food from the mouth to the stomach. A valve at the lower end of the esophagus prevents stomach acid from flowing upward. When this valve doesn't work properly, stomach contents may repeatedly flow back up (reflux) into the esophagus. This is called gastroesophageal reflux disease (GERD). GERD can irritate the esophagus. It can cause problems with swallowing or breathing. In severe cases, GERD can cause recurrent pneumonia or other serious problems.  Symptoms of reflux include burning, pressure or sharp pain in the upper abdomen or mid to lower chest. The pain can spread to the neck, back, or shoulder. There may be belching, an acid taste in the back of the throat, chronic cough, or sore throat or hoarseness. GERD symptoms often occur during the day after a big meal. They can also occur at night when lying down.   Home care  Lifestyle changes can help reduce symptoms. If needed, medicines may be prescribed. Symptoms often improve with treatment, but if treatment is stopped, the symptoms often return after a few months. So most persons with GERD will need to continue treatment.  Lifestyle changes    Limit or avoid fatty, fried, and spicy foods, as well as coffee, chocolate, mint, and foods with high acid content such as tomatoes and citrus fruit and juices (orange, grapefruit, lemon).    Don t eat large meals, especially at night. Frequent, smaller meals are best. Do not lie down right after  "eating. And don t eat anything 3 hours before going to bed.    Avoid drinking alcohol and smoking. As much as possible, stay away from second hand smoke.    If you are overweight, losing weight will reduce symptoms.     Avoid wearing tight clothing around your stomach area.    If your symptoms occur during sleep, use a foam wedge to elevate your upper body (not just your head.) Or, place 4\" blocks under the head of your bed.  Medicines  If needed, medicines can help relieve the symptoms of GERD and prevent damage to the esophagus. Discuss a medicine plan with your healthcare provider. This may include one or more of the following medicines:    Antacids to help neutralize the normal acids in your stomach.    Acid blockers (H2 blockers) to decrease acid production.    Acid inhibitors (PPIs) to decrease acid production in a different way than the blockers. They may work better, but can take a little longer to take effect.  Take an antacid 30-60 minutes after eating and at bedtime, but not at the same time as an acid blocker.  Try not to take medicines such as ibuprofen and aspirin. If you are taking aspirin for your heart or other medical reasons, talk to your healthcare provider about stopping it.  Follow-up care  Follow up with your healthcare provider or as advised by our staff.  When to seek medical advice  Call your healthcare provider if any of the following occur:    Stomach pain gets worse or moves to the lower right abdomen (appendix area)    Chest pain appears or gets worse, or spreads to the back, neck, shoulder, or arm    Frequent vomiting (can t keep down liquids)    Blood in the stool or vomit (red or black in color)    Feeling weak or dizzy    Fever of 100.4 F (38 C) or higher, or as directed by your healthcare provider  Date Last Reviewed: 6/23/2015 2000-2017 [employer]. [address] [place] [address], [place] [address]. All rights reserved. This information is not intended as a substitute for " professional medical care. Always follow your healthcare professional's instructions.                Follow-ups after your visit        Who to contact     If you have questions or need follow up information about today's clinic visit or your schedule please contact [facility] directly at [phone].  Normal or non-critical lab and imaging results will be communicated to you by MyChart, letter or phone within 4 business days after the clinic has received the results. If you do not hear from us within 7 days, please contact the clinic through [employer] or phone. If you have a critical or abnormal lab result, we will notify you by phone as soon as possible.  Submit refill requests through [employer] or call your pharmacy and they will forward the refill request to us. Please allow 3 business days for your refill to be completed.          Additional Information About Your Visit        MyChart Information     [employer] gives you secure access to your electronic health record. If you see a primary care provider, you can also send messages to your care team and make appointments. If you have questions, please call your primary care clinic.  If you do not have a primary care provider, please call [phone] and they will assist you.        Care EveryWhere ID     This is your Care EveryWhere ID. This could be used by other organizations to access your [facility] medical records  [id number]        Your Vitals Were     Pulse Temperature Last Period Pulse Oximetry Breastfeeding?       89 98.1  F (36.7  C) (Oral) 08/25/2017 99% No        Blood Pressure from Last 3 Encounters:   11/15/17 118/70   07/29/17 129/82   11/13/15 116/76    Weight from Last 3 Encounters:   07/29/17 166 lb (75.3 kg)   11/13/15 158 lb 1.6 oz (71.7 kg)   10/14/15 156 lb (70.8 kg)              We Performed the Following     Anti Nuclear Susan IgG by IFA with Reflex     HCG quantitative pregnancy     Lyme Disease Susan with reflex to WB Serum      Rheumatoid factor        Primary Care Provider Office Phone # Fax #    Kayla Becker, [facility] [phone]       [facility] [address] [facility] [address]  [facility] [address]        Equal Access to Services     LIZET VALLES : Hadii aad ku hadjordanao Soomaali, waaxda luqadaha, qaybta kaalmada adeegyada, waxeboni kaela ashleyn juan manuel multanipjgallito nolasco. So [place] [phone].    ATENCIÓN: Si habla español, tiene a sheets disposición servicios gratuitos de asistencia lingüística. Llame al [phone].    We comply with applicable federal civil rights laws and [place] laws. We do not discriminate on the basis of race, color, national origin, age, disability, sex, sexual orientation, or gender identity.            Thank you!     Thank you for choosing [facility]  for your care. Our goal is always to provide you with excellent care. Hearing back from our patients is one way we can continue to improve our services. Please take a few minutes to complete the written survey that you may receive in the mail after your visit with us. Thank you!             Your Updated Medication List - Protect others around you: Learn how to safely use, store and throw away your medicines at www.disposemymeds.org.          This list is accurate as of: 11/15/17  6:39 PM.  Always use your most recent med list.                   Brand Name Dispense Instructions for use Diagnosis    cholecalciferol 1000 UNIT tablet    vitamin D3     Take 1,000 Units by mouth        fish oil-omega-3 fatty acids 1000 MG capsule           MULTIVITAL PO

## 2017-11-15 NOTE — NURSING NOTE
"Chief Complaint   Patient presents with     Urgent Care     Back pain, stomach pain, nausea, fatigue [id number] (Patient last menstural period was august 265h-could possibly be pregnant) Patient took at home pregnancy test last week and it was negetive. Patient would also like her feet an knees checked because she is having off and on pain.        Initial /70 (BP Location: Right arm, Patient Position: Sitting, Cuff Size: Adult Regular)  Pulse 89  Temp 98.1  F (36.7  C) (Oral)  LMP 08/25/2017  SpO2 99%  Breastfeeding? No Estimated body mass index is 30.36 kg/(m^2) as calculated from the following:    Height as of 7/29/17: 5' 2\" (1.575 m).    Weight as of 7/29/17: 166 lb (75.3 kg).  Medication Reconciliation: complete   Marcela Torres CMA (AAMA)            "

## 2017-11-16 LAB — B-HCG SERPL-ACNC: <1 IU/L (ref 0–5)

## 2017-11-16 NOTE — PATIENT INSTRUCTIONS
Okay for tylenol for discomfort.      Myalgias  Myalgias are another word for muscle aches and soreness. This is a symptom, not a disease. Myalgias can have many causes. A cold, the flu, or an acute infection can cause them. So can any illness with a high fever. They may happen after exertion (such as heavy exercise) or injury (such as an accident or fall). Some medicines (such as statins and certain antidepressants) can cause myalgias. They can also be a symptom of chronic or ongoing medical problems (such as lupus, chronic fatigue, or hypothyroidism). With these illnesses, other serious symptoms often occur in addition to muscle pain and soreness.    Myalgias most often go away on their own. If they don't go away, come back, or are severe, testing may be needed to help find the cause.  Home care    Rest until you feel better.    Follow instructions that you were given for how to care for yourself. This may depend on the cause of your myalgias.     If myalgia is thought to be due to a medicine, be sure to talk to the doctor that prescribed the medicine about the best course of action.    To control pain, take prescription or over-the-counter medicines as directed. Unless told not to, you can try acetaminophen or ibuprofen.  Follow-up care  Follow up with your healthcare provider or as advised. If your symptoms do not go away in a few days or if they come back, follow up with your healthcare provider for an exam and testing.  When to see medical advice  Call your healthcare provider for any of the following:    Fever of 100.4 F (38 C) or higher, or as directed by your healthcare provider    Pain that gets worse and not better, or that goes away and comes back    New joint pains    New rash    Severe headache, neck pain, drowsiness, or confusion  Date Last Reviewed: 3/1/2017    2039-5608 [employer]. [address] [place] [address], [place] [address]. All rights reserved. This information is not intended as a  substitute for professional medical care. Always follow your healthcare professional's instructions.        GERD (Adult)    The esophagus is a tube that carries food from the mouth to the stomach. A valve at the lower end of the esophagus prevents stomach acid from flowing upward. When this valve doesn't work properly, stomach contents may repeatedly flow back up (reflux) into the esophagus. This is called gastroesophageal reflux disease (GERD). GERD can irritate the esophagus. It can cause problems with swallowing or breathing. In severe cases, GERD can cause recurrent pneumonia or other serious problems.  Symptoms of reflux include burning, pressure or sharp pain in the upper abdomen or mid to lower chest. The pain can spread to the neck, back, or shoulder. There may be belching, an acid taste in the back of the throat, chronic cough, or sore throat or hoarseness. GERD symptoms often occur during the day after a big meal. They can also occur at night when lying down.   Home care  Lifestyle changes can help reduce symptoms. If needed, medicines may be prescribed. Symptoms often improve with treatment, but if treatment is stopped, the symptoms often return after a few months. So most persons with GERD will need to continue treatment.  Lifestyle changes    Limit or avoid fatty, fried, and spicy foods, as well as coffee, chocolate, mint, and foods with high acid content such as tomatoes and citrus fruit and juices (orange, grapefruit, lemon).    Don t eat large meals, especially at night. Frequent, smaller meals are best. Do not lie down right after eating. And don t eat anything 3 hours before going to bed.    Avoid drinking alcohol and smoking. As much as possible, stay away from second hand smoke.    If you are overweight, losing weight will reduce symptoms.     Avoid wearing tight clothing around your stomach area.    If your symptoms occur during sleep, use a foam wedge to elevate your upper body (not just your  "head.) Or, place 4\" blocks under the head of your bed.  Medicines  If needed, medicines can help relieve the symptoms of GERD and prevent damage to the esophagus. Discuss a medicine plan with your healthcare provider. This may include one or more of the following medicines:    Antacids to help neutralize the normal acids in your stomach.    Acid blockers (H2 blockers) to decrease acid production.    Acid inhibitors (PPIs) to decrease acid production in a different way than the blockers. They may work better, but can take a little longer to take effect.  Take an antacid 30-60 minutes after eating and at bedtime, but not at the same time as an acid blocker.  Try not to take medicines such as ibuprofen and aspirin. If you are taking aspirin for your heart or other medical reasons, talk to your healthcare provider about stopping it.  Follow-up care  Follow up with your healthcare provider or as advised by our staff.  When to seek medical advice  Call your healthcare provider if any of the following occur:    Stomach pain gets worse or moves to the lower right abdomen (appendix area)    Chest pain appears or gets worse, or spreads to the back, neck, shoulder, or arm    Frequent vomiting (can t keep down liquids)    Blood in the stool or vomit (red or black in color)    Feeling weak or dizzy    Fever of 100.4 F (38 C) or higher, or as directed by your healthcare provider  Date Last Reviewed: 6/23/2015 2000-2017 [employer]. [address] [place] [address], [place] [address]. All rights reserved. This information is not intended as a substitute for professional medical care. Always follow your healthcare professional's instructions.        "

## 2017-11-16 NOTE — PROGRESS NOTES
SUBJECTIVE:  Chief Complaint   Patient presents with     Urgent Care     Back pain, stomach pain, nausea, fatigue [id number] (Patient last menstural period was august 265h-could possibly be pregnant) Patient took at home pregnancy test last week and it was negetive. Patient would also like her feet an knees checked because she is having off and on pain.      Agustin Ortega is a 26 year old female who presents with a chief complaint of back pain.     Menses irregular, will cycle 43-64 days.  Unsure if pregnant but states that did home pregnancy test and was negative.  Patient states that has been more tired for the past couple of months, having back pain, knee pain and stomach pain.  Denies any dysuria or frequency, denies any constipation or diarrhea.  No fever, no rash.  Patient endorsed GERD symptoms but this feels different.  Tried taking OTC medications but not resolving.    Denies any family history of arthritis or autoimmune.    No past medical history on file.  Current Outpatient Prescriptions   Medication Sig Dispense Refill     cholecalciferol (VITAMIN D3) 1000 UNIT tablet Take 1,000 Units by mouth       fish oil-omega-3 fatty acids 1000 MG capsule        Multiple Vitamins-Minerals (MULTIVITAL PO)        Social History   Substance Use Topics     Smoking status: Never Smoker     Smokeless tobacco: Never Used     Alcohol use No       ROS:  CONSTITUTIONAL:NEGATIVE for fever, chills, change in weight  INTEGUMENTARY/SKIN: NEGATIVE for worrisome rashes, moles or lesions  ENT/MOUTH: NEGATIVE for ear, mouth and throat problems  RESP:NEGATIVE for significant cough or SOB  CV: NEGATIVE for chest pain, palpitations or peripheral edema  GI: POSITIVE for abdominal pain, Hx GERD and nausea  : negative for dysuria and frequency and POSITIVE for abnormal menstrual cycles  MUSCULOSKELETAL: POSITIVE  for arthralgias, back pain and joint pain     EXAM:   /70 (BP Location: Right arm, Patient Position: Sitting, Cuff  Size: Adult Regular)  Pulse 89  Temp 98.1  F (36.7  C) (Oral)  LMP 08/25/2017  SpO2 99%  Breastfeeding? No  GENERAL APPEARANCE: healthy, alert and no distress  CHEST: clear to auscultation  CV: regular rate and rhythm  EXTREMITIES: peripheral pulses normal, no joint swelling  SKIN: no suspicious lesions or rashes  NEURO: Normal strength and tone, sensory exam grossly normal, mentation intact and speech normal    Urine pregnancy test - negative    X-RAY was not done.    ASSESSMENT/PLAN:  (M79.1) Myalgia  (primary encounter diagnosis)  Plan: Rheumatoid factor, Anti Nuclear Susan IgG by IFA         with Reflex, Lyme Disease Susan with reflex to WB        Serum            (N92.6) Irregular menses  Plan: HCG quantitative pregnancy            Patient here with several months of body pain and joint pain, reviewed that will obtain screening labs to assess for rheumatoid, autoimmune or lymes that may be causing symptoms.  Reviewed with patient that prolong joint and body pain will need to be evaluated further by primary and is not an urgent care evaluation.  Encourage plenty of fluids, rest and discussed symptomatic treatment with tylenol and ibuprofen.  Discussed abdominal discomfort, more specifically GERD and encourage to avoid foods that may worsen symptoms.     Reassurance given that repeated urine pregnancy is negative and that to cycle appropriately would need to be on OCP.  Patient to follow up with primary provider for further assessment and treatment if desires.    Nehemiah Lala MD

## 2017-11-17 LAB
ANA PAT SER IF-IMP: ABNORMAL
ANA SER QL IF: POSITIVE
ANA TITR SER IF: ABNORMAL {TITER}
B BURGDOR IGG+IGM SER QL: 0.12 (ref 0–0.89)
RHEUMATOID FACT SER NEPH-ACNC: <20 IU/ML (ref 0–20)

## 2017-11-19 ENCOUNTER — TELEPHONE (OUTPATIENT)
Dept: URGENT CARE | Facility: URGENT CARE | Age: 27
End: 2017-11-19

## 2017-11-19 NOTE — TELEPHONE ENCOUNTER
Notes Recorded by Anika Epstein PA-C on 11/17/2017 at 3:07 PM    Please call patient and inform her she has a positive antinuclear antibody (JACQUIE) test, which may indicate she has an autoimmune/rhuematologic disorder or other inflammatory process.   She should follow up with her PCP for further testing.    Phone call to patient - discussed lab results - she has an appt. To establish care with Dr. Polo and also an appt. with Rheumatology scheduled - no further questions at this time     Domitila Pierson R.N.

## 2017-11-30 ENCOUNTER — OFFICE VISIT (OUTPATIENT)
Dept: INTERNAL MEDICINE | Facility: CLINIC | Age: 27
End: 2017-11-30
Payer: COMMERCIAL

## 2017-11-30 VITALS
DIASTOLIC BLOOD PRESSURE: 80 MMHG | WEIGHT: 175.5 LBS | OXYGEN SATURATION: 98 % | SYSTOLIC BLOOD PRESSURE: 108 MMHG | TEMPERATURE: 98.2 F | HEART RATE: 92 BPM | HEIGHT: 62 IN | BODY MASS INDEX: 32.3 KG/M2

## 2017-11-30 DIAGNOSIS — Z13.220 SCREENING CHOLESTEROL LEVEL: ICD-10-CM

## 2017-11-30 DIAGNOSIS — N91.2 AMENORRHEA: ICD-10-CM

## 2017-11-30 DIAGNOSIS — R76.8 POSITIVE ANTINUCLEAR ANTIBODY: Primary | ICD-10-CM

## 2017-11-30 DIAGNOSIS — M79.10 MUSCLE ACHE: ICD-10-CM

## 2017-11-30 DIAGNOSIS — M25.50 ARTHRALGIA, UNSPECIFIED JOINT: ICD-10-CM

## 2017-11-30 DIAGNOSIS — R11.0 NAUSEA: ICD-10-CM

## 2017-11-30 DIAGNOSIS — R53.83 OTHER FATIGUE: ICD-10-CM

## 2017-11-30 LAB
ALBUMIN SERPL-MCNC: 3.6 G/DL (ref 3.4–5)
ALP SERPL-CCNC: 73 U/L (ref 40–150)
ALT SERPL W P-5'-P-CCNC: 18 U/L (ref 0–50)
ANION GAP SERPL CALCULATED.3IONS-SCNC: 8 MMOL/L (ref 3–14)
AST SERPL W P-5'-P-CCNC: 17 U/L (ref 0–45)
BILIRUB SERPL-MCNC: 0.3 MG/DL (ref 0.2–1.3)
BUN SERPL-MCNC: 15 MG/DL (ref 7–30)
CALCIUM SERPL-MCNC: 8.6 MG/DL (ref 8.5–10.1)
CHLORIDE SERPL-SCNC: 106 MMOL/L (ref 94–109)
CHOLEST SERPL-MCNC: 146 MG/DL
CK SERPL-CCNC: 62 U/L (ref 30–225)
CO2 SERPL-SCNC: 24 MMOL/L (ref 20–32)
CREAT SERPL-MCNC: 0.46 MG/DL (ref 0.52–1.04)
CRP SERPL-MCNC: 13 MG/L (ref 0–8)
ERYTHROCYTE [SEDIMENTATION RATE] IN BLOOD BY WESTERGREN METHOD: 21 MM/H (ref 0–20)
FSH SERPL-ACNC: 3.3 IU/L
GFR SERPL CREATININE-BSD FRML MDRD: >90 ML/MIN/1.7M2
GLUCOSE SERPL-MCNC: 80 MG/DL (ref 70–99)
HBV SURFACE AB SERPL IA-ACNC: 22.55 M[IU]/ML
HBV SURFACE AG SERPL QL IA: NONREACTIVE
HDLC SERPL-MCNC: 51 MG/DL
LDLC SERPL CALC-MCNC: 81 MG/DL
NONHDLC SERPL-MCNC: 95 MG/DL
POTASSIUM SERPL-SCNC: 3.9 MMOL/L (ref 3.4–5.3)
PROLACTIN SERPL-MCNC: 14 UG/L (ref 3–27)
PROT SERPL-MCNC: 7.2 G/DL (ref 6.8–8.8)
SODIUM SERPL-SCNC: 138 MMOL/L (ref 133–144)
TRIGL SERPL-MCNC: 71 MG/DL
VIT B12 SERPL-MCNC: 284 PG/ML (ref 193–986)

## 2017-11-30 PROCEDURE — 86235 NUCLEAR ANTIGEN ANTIBODY: CPT | Performed by: INTERNAL MEDICINE

## 2017-11-30 PROCEDURE — 86431 RHEUMATOID FACTOR QUANT: CPT | Performed by: INTERNAL MEDICINE

## 2017-11-30 PROCEDURE — 84165 PROTEIN E-PHORESIS SERUM: CPT | Performed by: INTERNAL MEDICINE

## 2017-11-30 PROCEDURE — 80061 LIPID PANEL: CPT | Performed by: INTERNAL MEDICINE

## 2017-11-30 PROCEDURE — 80053 COMPREHEN METABOLIC PANEL: CPT | Performed by: INTERNAL MEDICINE

## 2017-11-30 PROCEDURE — 86747 PARVOVIRUS ANTIBODY: CPT | Mod: 90 | Performed by: INTERNAL MEDICINE

## 2017-11-30 PROCEDURE — 99214 OFFICE O/P EST MOD 30 MIN: CPT | Performed by: INTERNAL MEDICINE

## 2017-11-30 PROCEDURE — 86200 CCP ANTIBODY: CPT | Performed by: INTERNAL MEDICINE

## 2017-11-30 PROCEDURE — 84146 ASSAY OF PROLACTIN: CPT | Performed by: INTERNAL MEDICINE

## 2017-11-30 PROCEDURE — 85652 RBC SED RATE AUTOMATED: CPT | Performed by: INTERNAL MEDICINE

## 2017-11-30 PROCEDURE — 82607 VITAMIN B-12: CPT | Performed by: INTERNAL MEDICINE

## 2017-11-30 PROCEDURE — 86706 HEP B SURFACE ANTIBODY: CPT | Performed by: INTERNAL MEDICINE

## 2017-11-30 PROCEDURE — 86038 ANTINUCLEAR ANTIBODIES: CPT | Performed by: INTERNAL MEDICINE

## 2017-11-30 PROCEDURE — 86665 EPSTEIN-BARR CAPSID VCA: CPT | Performed by: INTERNAL MEDICINE

## 2017-11-30 PROCEDURE — 00000402 ZZHCL STATISTIC TOTAL PROTEIN: Performed by: INTERNAL MEDICINE

## 2017-11-30 PROCEDURE — 86140 C-REACTIVE PROTEIN: CPT | Performed by: INTERNAL MEDICINE

## 2017-11-30 PROCEDURE — 82550 ASSAY OF CK (CPK): CPT | Performed by: INTERNAL MEDICINE

## 2017-11-30 PROCEDURE — 36415 COLL VENOUS BLD VENIPUNCTURE: CPT | Performed by: INTERNAL MEDICINE

## 2017-11-30 PROCEDURE — 99000 SPECIMEN HANDLING OFFICE-LAB: CPT | Performed by: INTERNAL MEDICINE

## 2017-11-30 PROCEDURE — 83001 ASSAY OF GONADOTROPIN (FSH): CPT | Performed by: INTERNAL MEDICINE

## 2017-11-30 PROCEDURE — 86665 EPSTEIN-BARR CAPSID VCA: CPT | Mod: 59 | Performed by: INTERNAL MEDICINE

## 2017-11-30 PROCEDURE — 87340 HEPATITIS B SURFACE AG IA: CPT | Performed by: INTERNAL MEDICINE

## 2017-11-30 NOTE — MR AVS SNAPSHOT
After Visit Summary   11/30/2017    Agustin Ortega    MRN: [id number]           Patient Information     Date Of Birth          1990        Visit Information        Provider Department      11/30/2017 7:00 AM Kourtney Junior MD [facility]        Today's Diagnoses     Positive antinuclear antibody    -  1    Amenorrhea        Arthralgia, unspecified joint        Muscle ache        Other fatigue        Nausea        Screening cholesterol level           Follow-ups after your visit        Your next 10 appointments already scheduled     Dec 13, 2017 10:15 AM CST   New Visit with Axel Dodge MD   [facility]    [address] [address] [phone]              Who to contact     If you have questions or need follow up information about today's clinic visit or your schedule please contact [facility] directly at [phone].  Normal or non-critical lab and imaging results will be communicated to you by MyChart, letter or phone within 4 business days after the clinic has received the results. If you do not hear from us within 7 days, please contact the clinic through Tamtront or phone. If you have a critical or abnormal lab result, we will notify you by phone as soon as possible.  Submit refill requests through Travelata or call your pharmacy and they will forward the refill request to us. Please allow 3 business days for your refill to be completed.          Additional Information About Your Visit        MyChart Information     Travelata gives you secure access to your electronic health record. If you see a primary care provider, you can also send messages to your care team and make appointments. If you have questions, please call your primary care clinic.  If you do not have a primary care provider, please call [phone] and they will assist you.       "  Care EveryWhere ID     This is your Care EveryWhere ID. This could be used by other organizations to access your [place] medical records  [id number]        Your Vitals Were     Pulse Temperature Height Last Period Pulse Oximetry Breastfeeding?    92 98.2  F (36.8  C) (Oral) 5' 2\" (1.575 m) 08/25/2017 98% No    BMI (Body Mass Index)                   32.1 kg/m2            Blood Pressure from Last 3 Encounters:   11/30/17 108/80   11/15/17 118/70   07/29/17 129/82    Weight from Last 3 Encounters:   11/30/17 175 lb 8 oz (79.6 kg)   07/29/17 166 lb (75.3 kg)   11/13/15 158 lb 1.6 oz (71.7 kg)              We Performed the Following     Anti Nuclear Susan IgG by IFA with Reflex     CK total     Comprehensive metabolic panel     CRP inflammation     Cyclic Citrullinated Peptide Antibody IgG     EBV Capsid Antibody IgG     EBV Capsid Antibody IgM     Erythrocyte sedimentation rate auto     Follicle stimulating hormone     Hepatitis B Surface Antibody     Hepatitis B surface antigen     Lipid panel reflex to direct LDL Fasting     Parvovirus B19 antibodies IgG IgM     Prolactin     Protein electrophoresis     Rheumatoid factor     RNP Antibody IgG     Pena VASYL Antibody IgG     SSA Ro VASYL Antibody IgG     SSB La VASYL Antibody IgG     Vitamin B12        Primary Care Provider Office Phone # Fax #    Kourtney Marivel Junior [phone]       [address] [facility] [address]        Equal Access to Services     CHRISTIE VALLES : Hadii aad ku hadasho Soomaali, waaxda luqadaha, qaybta kaalmada adeegyada, solomon nolasco. So [facility] [phone].    ATENCIÓN: Si habla español, tiene a sheets disposición servicios gratuitos de asistencia lingüística. Sonja al [phone].    We comply with applicable federal civil rights laws and [place] laws. We do not discriminate on the basis of race, color, national origin, age, disability, sex, sexual orientation, or gender identity.       "      Thank you!     Thank you for choosing [facility]  for your care. Our goal is always to provide you with excellent care. Hearing back from our patients is one way we can continue to improve our services. Please take a few minutes to complete the written survey that you may receive in the mail after your visit with us. Thank you!             Your Updated Medication List - Protect others around you: Learn how to safely use, store and throw away your medicines at www.disposemymeds.org.          This list is accurate as of: 11/30/17 11:59 PM.  Always use your most recent med list.                   Brand Name Dispense Instructions for use Diagnosis    cholecalciferol 1000 UNIT tablet    vitamin D3     Take 1,000 Units by mouth        fish oil-omega-3 fatty acids 1000 MG capsule           MULTIVITAL PO

## 2017-11-30 NOTE — PROGRESS NOTES
Dr Polo's note          ASSESSMENT & PLAN:                                                      27-year-old woman with body and joints ache and positive JACQUIE.  I will order more lab test for connective tissue disorders and she will follow up with rheumatology    (R76.8) Positive antinuclear antibody  (primary encounter diagnosis)  Comment:   Plan: Anti Nuclear Susan IgG by IFA with Reflex, CRP         inflammation, Cyclic Citrullinated Peptide         Antibody IgG, Erythrocyte sedimentation rate         auto, Rheumatoid factor, CK total, RNP Antibody        IgG, Smith VASYL Antibody IgG, Protein         electrophoresis            (N91.2) Amenorrhea  Comment:   Plan: Prolactin, Follicle stimulating hormone            (M25.50) Arthralgia, unspecified joint  Comment:   Plan: Anti Nuclear Susan IgG by IFA with Reflex, CRP         inflammation, Cyclic Citrullinated Peptide         Antibody IgG, Erythrocyte sedimentation rate         auto, Rheumatoid factor, CK total, SSA Ro VASYL         Antibody IgG, SSB La VASYL Antibody IgG, RNP         Antibody IgG, Smith VASYL Antibody IgG, Protein         electrophoresis, Parvovirus B19 antibodies IgG         IgM, EBV Capsid Antibody IgG, EBV Capsid         Antibody IgM, Hepatitis C Screen Reflex to HCV         RNA Quant and Genotype, Hepatitis B surface         antigen, Hepatitis B Surface Antibody            (M79.1) Muscle ache  Comment:   Plan: CK total, RNP Antibody IgG, Pena VASYL Antibody         IgG, Protein electrophoresis            (R53.83) Other fatigue  Comment:   Plan: Prolactin, Vitamin B12, Comprehensive metabolic        panel            (R11.0) Nausea  Comment:   Plan: H Pylori antigen, stool            (Z13.220) Screening cholesterol level  Comment:   Plan: Lipid panel reflex to direct LDL Fasting               Chief complaint:                                                      Fatigue, body pains, joints pain    SUBJECTIVE:   Agustin Ortega is a 27 year old female who  "presents to clinic today for the following health issues:    Fatigue  -- x 3-4 m getting worse  --     Body pain   -- Vit D low normal  -- more joints.     Joints pain  -- knees and legs and hands   -- worse if they are in the same position for long time  -- no Raynaud syndrome  -- sometimes she feels feet swelling at the end of the day    Nausea  -- during the day, worse at night   -- she doesn't feel acid reflux  -- wakes her up at night  -- abd ache all over  -- food doesn't make a difference and she doesn't usually eat fatty food.   -- change in BM: some loose BM. No blood. Sometimes cramps   -- no fam Hx of bowel prob    End of July she had [place] palsy     Irreg periods  -- usually 45 - 57 days  -- no period since Aug 25.   -- several neg pregnancy tests   -- no vaginal discharge     *Establish Care  *ER F/U-Per 11/19/17 tel enc, she has appt also scheduled with rheumatology 12/13/17. Had positive JACQUIE       ED/UC Followup:    Facility:  [facility]  Date of visit: 11/15/17  Reason for visit: Back pain, stomach pain, nausea, fatigue.  Current Status:        Review of Systems:                                                      ROS: negative for fever, chills, cough, wheezes, chest pain, shortness of breath, vomiting, abdominal pain, leg swelling     A 10-point review of systems was obtained.  Those pertinent are above and in the in the Subjective section.  The rest of the systems are negative.           OBJECTIVE:             Physical exam:  Blood pressure 108/80, pulse 92, temperature 98.2  F (36.8  C), temperature source Oral, height 5' 2\" (1.575 m), weight 175 lb 8 oz (79.6 kg), last menstrual period 08/25/2017, SpO2 98 %, not currently breastfeeding.   NAD, appears comfortable  Skin: no rashes   HEENT: PERRLA, EOMI, pink conjunctiva, anicteric sclerae, bilateral tympanic membranes are clinically normal, oropharynx is normal color  Neck: supple, no JVD,  No thyroidmegaly. Lymph nodes " nonpalpable cervical and supraclavicular.  Chest: clear to auscultation bilaterally, good respiratory effort  Heart: S1 S2, RRR, no mgr appreciated  Abdomen: soft, not tender, no hepatosplenomegaly or masses appreciated, no abdominal bruit, present bowel sounds  Extremities: no edema, I do not appreciate any swelling of her hands elbows knees ankles  Neurologic: A, Ox3, no focal signs appreciated    PMHx: reviewed  History reviewed. No pertinent past medical history.   PSHx: reviewed  Past Surgical History:   Procedure Laterality Date     ENT SURGERY          Meds: reviewed  Current Outpatient Prescriptions   Medication Sig Dispense Refill     cholecalciferol (VITAMIN D3) 1000 UNIT tablet Take 1,000 Units by mouth       fish oil-omega-3 fatty acids 1000 MG capsule        Multiple Vitamins-Minerals (MULTIVITAL PO)          Soc Hx: reviewed  Fam Hx: reviewed          Kourtney Polo MD  Internal Medicine

## 2017-11-30 NOTE — NURSING NOTE
"Chief Complaint   Patient presents with     Establish Care     ER F/U       Initial /80 (BP Location: Right arm, Patient Position: Chair, Cuff Size: Adult Large)  Pulse 92  Temp 98.2  F (36.8  C) (Oral)  Ht 5' 2\" (1.575 m)  Wt 175 lb 8 oz (79.6 kg)  LMP 08/25/2017  SpO2 98%  Breastfeeding? No  BMI 32.1 kg/m2 Estimated body mass index is 32.1 kg/(m^2) as calculated from the following:    Height as of this encounter: 5' 2\" (1.575 m).    Weight as of this encounter: 175 lb 8 oz (79.6 kg).  Medication Reconciliation: complete   Kristie Izaguirre CMA      "

## 2017-12-01 DIAGNOSIS — R11.0 NAUSEA: ICD-10-CM

## 2017-12-01 LAB
ALBUMIN SERPL ELPH-MCNC: 3.8 G/DL (ref 3.7–5.1)
ALPHA1 GLOB SERPL ELPH-MCNC: 0.3 G/DL (ref 0.2–0.4)
ALPHA2 GLOB SERPL ELPH-MCNC: 0.8 G/DL (ref 0.5–0.9)
ANA SER QL IF: NEGATIVE
B-GLOBULIN SERPL ELPH-MCNC: 0.7 G/DL (ref 0.6–1)
B19V IGG SER IA-ACNC: 0.25 IV
B19V IGM SER IA-ACNC: 0.2 IV
EBV VCA IGG SER QL IA: 3.4 AI (ref 0–0.8)
EBV VCA IGM SER QL IA: 0.3 AI (ref 0–0.8)
ENA RNP IGG SER IA-ACNC: <0.2 AI (ref 0–0.9)
ENA SM IGG SER-ACNC: <0.2 AI (ref 0–0.9)
ENA SS-A IGG SER IA-ACNC: <0.2 AI (ref 0–0.9)
ENA SS-B IGG SER IA-ACNC: <0.2 AI (ref 0–0.9)
GAMMA GLOB SERPL ELPH-MCNC: 1.1 G/DL (ref 0.7–1.6)
M PROTEIN SERPL ELPH-MCNC: 0 G/DL
PROT PATTERN SERPL ELPH-IMP: NORMAL
RHEUMATOID FACT SER NEPH-ACNC: <20 IU/ML (ref 0–20)

## 2017-12-01 PROCEDURE — 87338 HPYLORI STOOL AG IA: CPT | Performed by: INTERNAL MEDICINE

## 2017-12-02 ENCOUNTER — MYC MEDICAL ADVICE (OUTPATIENT)
Dept: INTERNAL MEDICINE | Facility: CLINIC | Age: 27
End: 2017-12-02

## 2017-12-04 LAB
CCP AB SER IA-ACNC: 1 U/ML
H PYLORI AG STL QL IA: NORMAL
SPECIMEN SOURCE: NORMAL

## 2017-12-05 ENCOUNTER — MYC MEDICAL ADVICE (OUTPATIENT)
Dept: INTERNAL MEDICINE | Facility: CLINIC | Age: 27
End: 2017-12-05

## 2017-12-06 RX ORDER — LANOLIN ALCOHOL/MO/W.PET/CERES
1000 CREAM (GRAM) TOPICAL DAILY
Qty: 30 TABLET | Refills: 0 | COMMUNITY
Start: 2017-12-06 | End: 2018-12-13

## 2017-12-12 NOTE — PROGRESS NOTES
[facility] - Rheumatology Clinic Visit     Agustin Ortega MRN# [id number]   YOB: 1990    Primary care provider: Kourtney Junior  Dec 13, 2017          Assessment and Plan:   # Polyarthralgia following Bell's palsy 2017  # Chronic fatigue following Bell's palsy  # JACQUIE- repeat test was negative.   # CRP elevated at 13; sed rate 21    JACQUIE was false positive the first time. Second time it was negative.   VASYL panel negative.   Basic blood cell counts, liver and kidney function labs within normal limits  RF and CCP antibody negative.   CK normal    TSH normal; Parvovirus IgM neg.  Lyme negative.   UA - no RBC  HBV neg.     The labs, imaging from patient records are reviewed.     I will be back in touch with the patient through mychart/letter when results are available.     Most Recent Immunizations   Administered Date(s) Administered     Influenza (IIV3) PF 11/01/2017     Tdap (Adacel,Boostrix) 08/06/2012       Orders Placed This Encounter   Procedures     Antineutrophil cytoplasmic Susan IgG     Vasculitis panel     Angiotensin converting enzyme     CRP inflammation     Erythrocyte sedimentation rate auto       Return in about 2 weeks (around 12/27/2017).    There are no discontinued medications.  Current Outpatient Prescriptions   Medication Sig Dispense Refill     acetaminophen (TYLENOL) 500 MG tablet Take 1-2 tablets (500-1,000 mg) by mouth every 6 hours as needed for mild pain       ibuprofen 200 MG capsule Take 200 mg by mouth every 4 hours as needed for fever 120 capsule      predniSONE (DELTASONE) 5 MG tablet Take 3 tablets (15 mg) by mouth daily for 14 days 42 tablet 0     cyanocobalamin (VITAMIN  B-12) 1000 MCG tablet Take 1 tablet (1,000 mcg) by mouth daily 30 tablet 0     cholecalciferol (VITAMIN D3) 1000 UNIT tablet Take 1,000 Units by mouth       fish oil-omega-3 fatty acids 1000 MG capsule        Multiple Vitamins-Minerals (MULTIVITAL PO)          Axel Dodge,  MD [facility]          Active Problem List:     Patient Active Problem List    Diagnosis Date Noted     GERD (gastroesophageal reflux disease) 03/06/2013     Priority: Medium     CARDIOVASCULAR SCREENING; LDL GOAL LESS THAN 160 03/06/2013     Priority: Medium            History of Present Illness:     Chief Complaint   Patient presents with     Establish Care       December 12, 2017    Have you ever seen a rheumatologist No Who NA When NA  Joint pain history  Onset: pt states that she has hand , feet and knee pain. leg pain is during the day, hand pain at night.Has a lot of fatigue Sx started this summer. Has a positive JACQUIE  Involved joints: see above   Pain scale:  1/10     Wakes the patient from sleep : No  Morning stiffness:No  Meds used:nothing     Interim history  Since last visit:  1. Infections - Yes/ bells palsy , sx started after having bells palsy in July 2017  2. New symptoms/medical problem - yes, has stomach pains and nausea on and off since 2012  3. Any side effects from Rheum medications -NA  3. ER visits/Hospitalizations/surgeries - Yes/ ER for [place] Palsy in July 4. Last PCP visit: 11/30/17    Wt Readings from Last 4 Encounters:   12/13/17 77.7 kg (171 lb 3.2 oz)   11/30/17 79.6 kg (175 lb 8 oz)   07/29/17 75.3 kg (166 lb)   11/13/15 71.7 kg (158 lb 1.6 oz)       Fatigue-     8/10    nausea, constipation, diarrhea - on and off; also abdominal pain on and off    No h/o fevers, rash, swollen glands  No family or personal history of psoriasis, ulcerative colitis or chron's disease. No h/o iritis.   Patient denies any raynauds, malar rash, skin photosensitivity, recurrent mouth ulcers, recurrent miscarriages or arterial/venous thrombosis in the past  No h/o persistent shortness of breath, cough, chest pain  No h/o hematochezia, hematuria, hemoptysis  No h/o seizures     BP Readings from Last 3 Encounters:   12/13/17 122/82   11/30/17 108/80   11/15/17 118/70              Review of Systems:  "  Complete ROS negative except for symptoms mentioned in the HPI          Past Medical History:   No past medical history on file.  Past Surgical History:   Procedure Laterality Date     ENT SURGERY              Social History:     Social History     Occupational History     Not on file.     Social History Main Topics     Smoking status: Never Smoker     Smokeless tobacco: Never Used     Alcohol use No     Drug use: No     Sexual activity: Yes     Partners: Male            Family History:     Family History   Problem Relation Age of Onset     DIABETES Father      Hypertension Father      Hyperlipidemia Father      CEREBROVASCULAR DISEASE Father 60     CANCER Maternal Grandmother      CANCER Maternal Grandfather      DIABETES Brother 31     Type 2            Allergies:   No Known Allergies         Medications:     Current Outpatient Prescriptions   Medication Sig Dispense Refill     acetaminophen (TYLENOL) 500 MG tablet Take 1-2 tablets (500-1,000 mg) by mouth every 6 hours as needed for mild pain       ibuprofen 200 MG capsule Take 200 mg by mouth every 4 hours as needed for fever 120 capsule      predniSONE (DELTASONE) 5 MG tablet Take 3 tablets (15 mg) by mouth daily for 14 days 42 tablet 0     cyanocobalamin (VITAMIN  B-12) 1000 MCG tablet Take 1 tablet (1,000 mcg) by mouth daily 30 tablet 0     cholecalciferol (VITAMIN D3) 1000 UNIT tablet Take 1,000 Units by mouth       fish oil-omega-3 fatty acids 1000 MG capsule        Multiple Vitamins-Minerals (MULTIVITAL PO)               Physical Exam:   Blood pressure 122/82, pulse 88, temperature 98.1  F (36.7  C), temperature source Oral, height 1.549 m (5' 1\"), weight 77.7 kg (171 lb 3.2 oz), last menstrual period 08/25/2017, SpO2 97 %, not currently breastfeeding.  Wt Readings from Last 4 Encounters:   12/13/17 77.7 kg (171 lb 3.2 oz)   11/30/17 79.6 kg (175 lb 8 oz)   07/29/17 75.3 kg (166 lb)   11/13/15 71.7 kg (158 lb 1.6 oz)       Constitutional: well-developed, " appearing stated age; cooperative  Eyes: PERRLA, normal conjunctiva, sclera  ENT: nl external ears, nose, lips.No mucous membrane lesions, normal saliva pool  Neck: no cervical lymphadenopathy  Resp: lungs clear to auscultation,   CV: RRR, no added sounds, no edema  GI: Abdomen soft and no tenderness  : not tested  Lymph: no cervical, supraclavicular or epitrochlear nodes  MS: All shoulder, elbow, wrist, MCP/PIP/DIP, hip, knee, ankle, and foot MTP/IP joints were examined and  found normal. No active synovitis or deformity. Full ROM.  No dactylitis,  tenosynovitis, enthesopathy.  Skin: no rash in exposed areas  Psych: nl judgement, orientation, memory, affect.         Data:         Axel Dodge MD    [facility]

## 2017-12-13 ENCOUNTER — OFFICE VISIT (OUTPATIENT)
Dept: RHEUMATOLOGY | Facility: CLINIC | Age: 27
End: 2017-12-13
Payer: COMMERCIAL

## 2017-12-13 VITALS
OXYGEN SATURATION: 97 % | TEMPERATURE: 98.1 F | SYSTOLIC BLOOD PRESSURE: 122 MMHG | HEART RATE: 88 BPM | HEIGHT: 61 IN | WEIGHT: 171.2 LBS | DIASTOLIC BLOOD PRESSURE: 82 MMHG | BODY MASS INDEX: 32.32 KG/M2

## 2017-12-13 DIAGNOSIS — M25.50 PAIN IN JOINT, MULTIPLE SITES: Primary | ICD-10-CM

## 2017-12-13 LAB
CRP SERPL-MCNC: 15 MG/L (ref 0–8)
ERYTHROCYTE [SEDIMENTATION RATE] IN BLOOD BY WESTERGREN METHOD: 27 MM/H (ref 0–20)

## 2017-12-13 PROCEDURE — 99214 OFFICE O/P EST MOD 30 MIN: CPT | Performed by: INTERNAL MEDICINE

## 2017-12-13 PROCEDURE — 83516 IMMUNOASSAY NONANTIBODY: CPT | Performed by: INTERNAL MEDICINE

## 2017-12-13 PROCEDURE — 82164 ANGIOTENSIN I ENZYME TEST: CPT | Mod: 90 | Performed by: INTERNAL MEDICINE

## 2017-12-13 PROCEDURE — 85652 RBC SED RATE AUTOMATED: CPT | Performed by: INTERNAL MEDICINE

## 2017-12-13 PROCEDURE — 36415 COLL VENOUS BLD VENIPUNCTURE: CPT | Performed by: INTERNAL MEDICINE

## 2017-12-13 PROCEDURE — 99000 SPECIMEN HANDLING OFFICE-LAB: CPT | Performed by: INTERNAL MEDICINE

## 2017-12-13 PROCEDURE — 83876 ASSAY MYELOPEROXIDASE: CPT | Performed by: INTERNAL MEDICINE

## 2017-12-13 PROCEDURE — 86140 C-REACTIVE PROTEIN: CPT | Performed by: INTERNAL MEDICINE

## 2017-12-13 PROCEDURE — 86255 FLUORESCENT ANTIBODY SCREEN: CPT | Mod: 90 | Performed by: INTERNAL MEDICINE

## 2017-12-13 RX ORDER — OMEGA-3 FATTY ACIDS/FISH OIL 300-1000MG
200 CAPSULE ORAL EVERY 4 HOURS PRN
Qty: 120 CAPSULE | COMMUNITY
Start: 2017-12-13 | End: 2018-12-13

## 2017-12-13 RX ORDER — ACETAMINOPHEN 500 MG
500-1000 TABLET ORAL EVERY 6 HOURS PRN
COMMUNITY
Start: 2017-12-13 | End: 2018-12-13

## 2017-12-13 RX ORDER — PREDNISONE 5 MG/1
15 TABLET ORAL DAILY
Qty: 42 TABLET | Refills: 0 | Status: SHIPPED | OUTPATIENT
Start: 2017-12-13 | End: 2018-01-02

## 2017-12-13 ASSESSMENT — ROUTINE ASSESSMENT OF PATIENT INDEX DATA (RAPID3)
TOTAL RAPID3 SCORE: 3.3
RAPID3 INTERPRETATION: LOW 3.1-6

## 2017-12-13 NOTE — MR AVS SNAPSHOT
After Visit Summary   12/13/2017    Agustin Ortega    MRN: [id number]           Patient Information     Date Of Birth          1990        Visit Information        Provider Department      12/13/2017 10:15 AM Axel Dodge MD [facility]        Today's Diagnoses     Pain in joint, multiple sites    -  1       Follow-ups after your visit        Follow-up notes from your care team     Return in about 2 weeks (around 12/27/2017).      Future tests that were ordered for you today     Open Future Orders        Priority Expected Expires Ordered    CRP inflammation Routine  1/12/2018 12/13/2017    Erythrocyte sedimentation rate auto Routine  1/12/2018 12/13/2017    Antineutrophil cytoplasmic Susan IgG Routine  1/12/2018 12/13/2017    Vasculitis panel Routine  1/12/2018 12/13/2017    Angiotensin converting enzyme Routine  1/12/2018 12/13/2017            Who to contact     If you have questions or need follow up information about today's clinic visit or your schedule please contact [facility] directly at [phone].  Normal or non-critical lab and imaging results will be communicated to you by [employer] Commun[employer], letter or phone within 4 business days after the clinic has received the results. If you do not hear from us within 7 days, please contact the clinic through [employer] or phone. If you have a critical or abnormal lab result, we will notify you by phone as soon as possible.  Submit refill requests through [email or web address] or call your pharmacy and they will forward the refill request to us. Please allow 3 business days for your refill to be completed.          Additional Information About Your Visit        MyChart Information     [email or web address] gives you secure access to your electronic health record. If you see a primary care provider, you can also send messages to your care team and make appointments. If you have questions, please call your primary care  "clinic.  If you do not have a primary care provider, please call [phone] and they will assist you.        Care EveryWhere ID     This is your Care EveryWhere ID. This could be used by other organizations to access your [place] medical records  [id number]        Your Vitals Were     Pulse Temperature Height Last Period Pulse Oximetry BMI (Body Mass Index)    88 98.1  F (36.7  C) (Oral) 1.549 m (5' 1\") 08/25/2017 97% 32.35 kg/m2       Blood Pressure from Last 3 Encounters:   12/13/17 122/82   11/30/17 108/80   11/15/17 118/70    Weight from Last 3 Encounters:   12/13/17 77.7 kg (171 lb 3.2 oz)   11/30/17 79.6 kg (175 lb 8 oz)   07/29/17 75.3 kg (166 lb)                 Today's Medication Changes          These changes are accurate as of: 12/13/17 10:56 AM.  If you have any questions, ask your nurse or doctor.               Start taking these medicines.        Dose/Directions    predniSONE 5 MG tablet   Commonly known as:  DELTASONE   Used for:  Pain in joint, multiple sites   Started by:  Axel Dodge MD        Dose:  15 mg   Take 3 tablets (15 mg) by mouth daily for 14 days   Quantity:  42 tablet   Refills:  0            Where to get your medicines      These medications were sent to [facility] [address] [address]     Phone:  [phone]     predniSONE 5 MG tablet                Primary Care Provider Office Phone # Fax #    Kourtney Junior [phone]       [address] [facility] [address]        Equal Access to Services     [facility] CenterSMILEY AH: Johnny Valverde, felecia casey, amelia kaalmaisaac manley, solomon nolasco. So [facility] [phone].    ATENCIÓN: Si habla español, tiene a sheets disposición servicios gratuitos de asistencia lingüística. Llame al [phone].    We comply with applicable federal civil rights laws and [place] laws. We do not " discriminate on the basis of race, color, national origin, age, disability, sex, sexual orientation, or gender identity.            Thank you!     Thank you for choosing [facility]  for your care. Our goal is always to provide you with excellent care. Hearing back from our patients is one way we can continue to improve our services. Please take a few minutes to complete the written survey that you may receive in the mail after your visit with us. Thank you!             Your Updated Medication List - Protect others around you: Learn how to safely use, store and throw away your medicines at www.disposemymeds.org.          This list is accurate as of: 12/13/17 10:56 AM.  Always use your most recent med list.                   Brand Name Dispense Instructions for use Diagnosis    cholecalciferol 1000 UNIT tablet    vitamin D3     Take 1,000 Units by mouth        cyanocobalamin 1000 MCG tablet    vitamin  B-12    30 tablet    Take 1 tablet (1,000 mcg) by mouth daily        fish oil-omega-3 fatty acids 1000 MG capsule           ibuprofen 200 MG capsule     120 capsule    Take 200 mg by mouth every 4 hours as needed for fever        MULTIVITAL PO           predniSONE 5 MG tablet    DELTASONE    42 tablet    Take 3 tablets (15 mg) by mouth daily for 14 days    Pain in joint, multiple sites       TYLENOL 500 MG tablet   Generic drug:  acetaminophen      Take 1-2 tablets (500-1,000 mg) by mouth every 6 hours as needed for mild pain

## 2017-12-13 NOTE — NURSING NOTE
"Chief Complaint   Patient presents with     [facility] Care       Initial /82 (BP Location: Left arm, Patient Position: Chair, Cuff Size: Adult Regular)  Pulse 88  Temp 98.1  F (36.7  C) (Oral)  Ht 1.549 m (5' 1\")  Wt 77.7 kg (171 lb 3.2 oz)  LMP 08/25/2017  SpO2 97%  BMI 32.35 kg/m2 Estimated body mass index is 32.35 kg/(m^2) as calculated from the following:    Height as of this encounter: 1.549 m (5' 1\").    Weight as of this encounter: 77.7 kg (171 lb 3.2 oz).  Medication Reconciliation: complete    Have you ever seen a rheumatologist No Who NA When NA  Joint pain history  Onset: pt states that she has hand , feet and knee pain. leg pain is during the day, hand pain at night.Has a lot of fatigue Sx started this summer. Has a positive JACQUIE  Involved joints: see above   Pain scale:  1/10     Wakes the patient from sleep : No  Morning stiffness:No  Meds used:nothing    Interim history  Since last visit:  1. Infections - Yes/ bells palsy , sx started after having bells palsy in July 2017  2. New symptoms/medical problem - yes, has stomach pains and nausea on and off since 2012  3. Any side effects from Rheum medications -NA  3. ER visits/Hospitalizations/surgeries - Yes/ ER for [place] Palsy in July 4. Last PCP visit: 11/30/17  Wt Readings from Last 4 Encounters:   12/13/17 77.7 kg (171 lb 3.2 oz)   11/30/17 79.6 kg (175 lb 8 oz)   07/29/17 75.3 kg (166 lb)   11/13/15 71.7 kg (158 lb 1.6 oz)     BP Readings from Last 3 Encounters:   12/13/17 122/82   11/30/17 108/80   11/15/17 118/70       "

## 2017-12-14 LAB
ACE SERPL-CCNC: 32 U/L (ref 9–67)
ANCA IGG TITR SER IF: NORMAL {TITER}
MYELOPEROXIDASE AB SER-ACNC: <0.2 AI (ref 0–0.9)
PROTEINASE3 IGG SER-ACNC: <0.2 AI (ref 0–0.9)

## 2017-12-14 NOTE — PROGRESS NOTES
Results released to [facility]:  Antibodies for specific type of vasculitis and sarcoidosis are negative (normal).   Inflammatory markers are still elevated. Though I do not suspect anything major, please keep the follow up appointment to discuss assessment and plan.     Sincerely    Axel Dodge MD  [facility]

## 2017-12-22 NOTE — PROGRESS NOTES
[place] - Rheumatology Clinic Visit     Agustin Ortega MRN# [id number]   YOB: 1990    Primary care provider: Kourtney Junior  Jan 2, 2018          Assessment and Plan:   # Polyarthralgia following Bell's palsy 2017  # Chronic fatigue following Bell's palsy  # JACQUIE- repeat test was negative.   # CRP elevated at 13; sed rate 21    JACQUIE was false positive the first time. Second time it was negative.   VASYL panel negative.   Basic blood cell counts, liver and kidney function labs within normal limits  RF and CCP antibody negative.   CK normal  12/14/17  ANCA Antibodies for specific type of vasculitis and sarcoidosis are negative (normal).   Inflammatory markers are still elevated.    TSH normal; Parvovirus IgM neg.  Lyme negative.   UA - no RBC  HBV neg.     The labs, imaging from patient records are reviewed. We discussed the lab results from above. Also prednisone 15mg PO daily X 1 week did not improve her polyarthralgia. Etiology for her joint pain is not clear however it is possible that this is atypical post-viral polyarthralgia and fatigue and is mild at present not affecting her ability to function at work or at home. Patient is not even needing OTC NSAIDs on daily basis. Since inflammatory markers are elevated (repeat today), it is appropriate to get an ID opinion if they are getting worse and her polyarthralgia and fatigue are not improving.     F/u with rheumatology PRN.     I will be back in touch with the patient through mychart/letter when results are available.     Most Recent Immunizations   Administered Date(s) Administered     Influenza (IIV3) PF 11/01/2017     Tdap (Adacel,Boostrix) 08/06/2012       Orders Placed This Encounter   Procedures     CRP inflammation     ESR     INFECTIOUS DISEASE REFERRAL       Data Unavailable    Medications Discontinued During This Encounter   Medication Reason     predniSONE (DELTASONE) 5 MG tablet      Current Outpatient Prescriptions    Medication Sig Dispense Refill     acetaminophen (TYLENOL) 500 MG tablet Take 1-2 tablets (500-1,000 mg) by mouth every 6 hours as needed for mild pain       ibuprofen 200 MG capsule Take 200 mg by mouth every 4 hours as needed for fever 120 capsule      cyanocobalamin (VITAMIN  B-12) 1000 MCG tablet Take 1 tablet (1,000 mcg) by mouth daily 30 tablet 0     cholecalciferol (VITAMIN D3) 1000 UNIT tablet Take 1,000 Units by mouth       fish oil-omega-3 fatty acids 1000 MG capsule        Multiple Vitamins-Minerals (MULTIVITAL PO)          Axel Dodge MD  [facility]          Active Problem List:     Patient Active Problem List    Diagnosis Date Noted     GERD (gastroesophageal reflux disease) 03/06/2013     Priority: Medium     CARDIOVASCULAR SCREENING; LDL GOAL LESS THAN 160 03/06/2013     Priority: Medium            History of Present Illness:     Chief Complaint   Patient presents with     RECHECK       December 12, 2017    Have you ever seen a rheumatologist No Who NA When NA  Joint pain history  Onset: pt states that she has hand , feet and knee pain. leg pain is during the day, hand pain at night.Has a lot of fatigue Sx started this summer. Has a positive JACQUIE  Involved joints: see above   Pain scale:  1/10     Wakes the patient from sleep : No  Morning stiffness:No  Meds used:nothing     Interim history  Since last visit:  1. Infections - Yes/ bells palsy , sx started after having bells palsy in July 2017  2. New symptoms/medical problem - yes, has stomach pains and nausea on and off since 2012  3. Any side effects from Rheum medications -NA  3. ER visits/Hospitalizations/surgeries - Yes/ ER for [place] Palsy in July 4. Last PCP visit: 11/30/17    Wt Readings from Last 4 Encounters:   01/02/18 78.2 kg (172 lb 8 oz)   12/13/17 77.7 kg (171 lb 3.2 oz)   11/30/17 79.6 kg (175 lb 8 oz)   07/29/17 75.3 kg (166 lb)       Fatigue-     8/10    nausea, constipation, diarrhea - on and off; also  abdominal pain on and off    No h/o fevers, rash, swollen glands  No family or personal history of psoriasis, ulcerative colitis or chron's disease. No h/o iritis.   Patient denies any raynauds, malar rash, skin photosensitivity, recurrent mouth ulcers, recurrent miscarriages or arterial/venous thrombosis in the past  No h/o persistent shortness of breath, cough, chest pain  No h/o hematochezia, hematuria, hemoptysis  No h/o seizures     January 2, 2018  Have you ever seen a rheumatologist Yes Who You When 12/13/17  Joint pain history  Onset: pt states that she continues with joint pain, states that the prednisone did not help.   Involved joints: knees, legs, and hands  Pain scale:  3/10     Wakes the patient from sleep : No  Morning stiffness:Yes for 1 minutes  Meds used:nothing; prednisone did not help at 15mg PO daily X 1 week;     Interim history  Since last visit:  1. Infections - No  2. New symptoms/medical problem - No  3. Any side effects from Rheum medications -NA  3. ER visits/Hospitalizations/surgeries - No  4. Last PCP visit: 11/30/17  BP Readings from Last 3 Encounters:   01/02/18 122/82   12/13/17 122/82   11/30/17 108/80              Review of Systems:   Complete ROS negative except for symptoms mentioned in the HPI          Past Medical History:   No past medical history on file.  Past Surgical History:   Procedure Laterality Date     ENT SURGERY              Social History:     Social History     Occupational History     Not on file.     Social History Main Topics     Smoking status: Never Smoker     Smokeless tobacco: Never Used     Alcohol use No     Drug use: No     Sexual activity: Yes     Partners: Male            Family History:     Family History   Problem Relation Age of Onset     DIABETES Father      Hypertension Father      Hyperlipidemia Father      CEREBROVASCULAR DISEASE Father 60     CANCER Maternal Grandmother      CANCER Maternal Grandfather      DIABETES Brother 31     Type 2         "    Allergies:   No Known Allergies         Medications:     Current Outpatient Prescriptions   Medication Sig Dispense Refill     acetaminophen (TYLENOL) 500 MG tablet Take 1-2 tablets (500-1,000 mg) by mouth every 6 hours as needed for mild pain       ibuprofen 200 MG capsule Take 200 mg by mouth every 4 hours as needed for fever 120 capsule      cyanocobalamin (VITAMIN  B-12) 1000 MCG tablet Take 1 tablet (1,000 mcg) by mouth daily 30 tablet 0     cholecalciferol (VITAMIN D3) 1000 UNIT tablet Take 1,000 Units by mouth       fish oil-omega-3 fatty acids 1000 MG capsule        Multiple Vitamins-Minerals (MULTIVITAL PO)               Physical Exam:   Blood pressure 122/82, pulse 103, temperature 98.2  F (36.8  C), temperature source Oral, height 1.549 m (5' 1\"), weight 78.2 kg (172 lb 8 oz), SpO2 98 %, not currently breastfeeding.  Wt Readings from Last 4 Encounters:   01/02/18 78.2 kg (172 lb 8 oz)   12/13/17 77.7 kg (171 lb 3.2 oz)   11/30/17 79.6 kg (175 lb 8 oz)   07/29/17 75.3 kg (166 lb)       Constitutional: well-developed, appearing stated age; cooperative  Psych: nl judgement, orientation, memory, affect.         Data:         Axel Dodge MD    [facility]    "

## 2017-12-24 ENCOUNTER — HEALTH MAINTENANCE LETTER (OUTPATIENT)
Age: 27
End: 2017-12-24

## 2018-01-02 ENCOUNTER — MYC MEDICAL ADVICE (OUTPATIENT)
Dept: RHEUMATOLOGY | Facility: CLINIC | Age: 28
End: 2018-01-02

## 2018-01-02 ENCOUNTER — OFFICE VISIT (OUTPATIENT)
Dept: RHEUMATOLOGY | Facility: CLINIC | Age: 28
End: 2018-01-02
Payer: COMMERCIAL

## 2018-01-02 VITALS
TEMPERATURE: 98.2 F | HEIGHT: 61 IN | OXYGEN SATURATION: 98 % | SYSTOLIC BLOOD PRESSURE: 122 MMHG | WEIGHT: 172.5 LBS | HEART RATE: 103 BPM | DIASTOLIC BLOOD PRESSURE: 82 MMHG | BODY MASS INDEX: 32.57 KG/M2

## 2018-01-02 DIAGNOSIS — G51.0 BELL'S PALSY: ICD-10-CM

## 2018-01-02 DIAGNOSIS — M25.50 PAIN IN JOINT, MULTIPLE SITES: Primary | ICD-10-CM

## 2018-01-02 DIAGNOSIS — R79.82 ELEVATED C-REACTIVE PROTEIN: ICD-10-CM

## 2018-01-02 LAB — ERYTHROCYTE [SEDIMENTATION RATE] IN BLOOD BY WESTERGREN METHOD: 19 MM/H (ref 0–20)

## 2018-01-02 PROCEDURE — 99213 OFFICE O/P EST LOW 20 MIN: CPT | Performed by: INTERNAL MEDICINE

## 2018-01-02 PROCEDURE — 36415 COLL VENOUS BLD VENIPUNCTURE: CPT | Performed by: INTERNAL MEDICINE

## 2018-01-02 PROCEDURE — 85652 RBC SED RATE AUTOMATED: CPT | Performed by: INTERNAL MEDICINE

## 2018-01-02 PROCEDURE — 86140 C-REACTIVE PROTEIN: CPT | Performed by: INTERNAL MEDICINE

## 2018-01-02 ASSESSMENT — ROUTINE ASSESSMENT OF PATIENT INDEX DATA (RAPID3)
RAPID3 INTERPRETATION: LOW 3.1-6
TOTAL RAPID3 SCORE: 4.7

## 2018-01-02 NOTE — NURSING NOTE
"Chief Complaint   Patient presents with     RECHECK       Initial /82 (BP Location: Left arm, Patient Position: Chair, Cuff Size: Adult Regular)  Pulse 103  Temp 98.2  F (36.8  C) (Oral)  Ht 1.549 m (5' 1\")  Wt 78.2 kg (172 lb 8 oz)  SpO2 98%  BMI 32.59 kg/m2 Estimated body mass index is 32.59 kg/(m^2) as calculated from the following:    Height as of this encounter: 1.549 m (5' 1\").    Weight as of this encounter: 78.2 kg (172 lb 8 oz).  Medication Reconciliation: complete    Have you ever seen a rheumatologist Yes Who You When 12/13/17  Joint pain history  Onset: pt states that she continues with joint pain, states that the prednisone did not help.   Involved joints: knees, legs, and hands  Pain scale:  3/10     Wakes the patient from sleep : No  Morning stiffness:Yes for 1 minutes  Meds used:nothing    Interim history  Since last visit:  1. Infections - No  2. New symptoms/medical problem - No  3. Any side effects from Rheum medications -NA  3. ER visits/Hospitalizations/surgeries - No  4. Last PCP visit: 11/30/17  Wt Readings from Last 4 Encounters:   01/02/18 78.2 kg (172 lb 8 oz)   12/13/17 77.7 kg (171 lb 3.2 oz)   11/30/17 79.6 kg (175 lb 8 oz)   07/29/17 75.3 kg (166 lb)     BP Readings from Last 3 Encounters:   01/02/18 122/82   12/13/17 122/82   11/30/17 108/80     "

## 2018-01-02 NOTE — MR AVS SNAPSHOT
After Visit Summary   1/2/2018    Agustin Ortega    MRN: [id number]           Patient Information     Date Of Birth          1990        Visit Information        Provider Department      1/2/2018 4:00 PM Axel Dodge MD [facility]        Today's Diagnoses     Pain in joint, multiple sites    -  1    Elevated C-reactive protein        Bell's palsy           Follow-ups after your visit        Additional Services     INFECTIOUS DISEASE REFERRAL       Your provider has referred you to: [facility]: [employer]  [place] [phone]   [email or web address]    Please be aware that coverage of these services is subject to the terms and limitations of your health insurance plan.  Call member services at your health plan with any benefit or coverage questions.      Please bring the following with you to your appointment:    (1) Any X-Rays, CTs or MRIs which have been performed.  Contact the facility where they were done to arrange for  prior to your scheduled appointment.    (2) List of current medications   (3) This referral request   (4) Any documents/labs given to you for this referral                  Who to contact     If you have questions or need follow up information about today's clinic visit or your schedule please contact [facility] directly at [phone].  Normal or non-critical lab and imaging results will be communicated to you by MyChart, letter or phone within 4 business days after the clinic has received the results. If you do not hear from us within 7 days, please contact the clinic through MyChart or phone. If you have a critical or abnormal lab result, we will notify you by phone as soon as possible.  Submit refill requests through [employer] or call your pharmacy and they will forward the refill request to us. Please allow 3 business days for your refill to be completed.          Additional  "Information About Your Visit        MyChart Information     [employer] gives you secure access to your electronic health record. If you see a primary care provider, you can also send messages to your care team and make appointments. If you have questions, please call your primary care clinic.  If you do not have a primary care provider, please call [phone] and they will assist you.        Care EveryWhere ID     This is your Care EveryWhere ID. This could be used by other organizations to access your [place] medical records  [id number]        Your Vitals Were     Pulse Temperature Height Pulse Oximetry BMI (Body Mass Index)       103 98.2  F (36.8  C) (Oral) 1.549 m (5' 1\") 98% 32.59 kg/m2        Blood Pressure from Last 3 Encounters:   01/02/18 122/82   12/13/17 122/82   11/30/17 108/80    Weight from Last 3 Encounters:   01/02/18 78.2 kg (172 lb 8 oz)   12/13/17 77.7 kg (171 lb 3.2 oz)   11/30/17 79.6 kg (175 lb 8 oz)              We Performed the Following     CRP inflammation     ESR     INFECTIOUS DISEASE REFERRAL          Today's Medication Changes          These changes are accurate as of: 1/2/18  4:29 PM.  If you have any questions, ask your nurse or doctor.               Stop taking these medicines if you haven't already. Please contact your care team if you have questions.     predniSONE 5 MG tablet   Commonly known as:  DELTASONE   Stopped by:  Axel Dodge MD                    Primary Care Provider Office Phone # Fax #    Kourtney Marivel Junior [phone]       [address] [facility] [address]        Equal Access to Services     [facility] : Hadgita Valverde, felecia casey, qasolomon colby. So [facility] [phone].    ATENCIÓN: Si habla español, tiene a sheets disposición servicios gratuitos de asistencia lingüística. Llame al [phone].    We comply with applicable federal civil rights " laws and [place] laws. We do not discriminate on the basis of race, color, national origin, age, disability, sex, sexual orientation, or gender identity.            Thank you!     Thank you for choosing [facility]  for your care. Our goal is always to provide you with excellent care. Hearing back from our patients is one way we can continue to improve our services. Please take a few minutes to complete the written survey that you may receive in the mail after your visit with us. Thank you!             Your Updated Medication List - Protect others around you: Learn how to safely use, store and throw away your medicines at www.disposemymeds.org.          This list is accurate as of: 1/2/18  4:29 PM.  Always use your most recent med list.                   Brand Name Dispense Instructions for use Diagnosis    cholecalciferol 1000 UNIT tablet    vitamin D3     Take 1,000 Units by mouth        cyanocobalamin 1000 MCG tablet    vitamin  B-12    30 tablet    Take 1 tablet (1,000 mcg) by mouth daily        fish oil-omega-3 fatty acids 1000 MG capsule           ibuprofen 200 MG capsule     120 capsule    Take 200 mg by mouth every 4 hours as needed for fever        MULTIVITAL PO           TYLENOL 500 MG tablet   Generic drug:  acetaminophen      Take 1-2 tablets (500-1,000 mg) by mouth every 6 hours as needed for mild pain

## 2018-01-03 LAB — CRP SERPL-MCNC: 23 MG/L (ref 0–8)

## 2018-01-04 DIAGNOSIS — R50.9 FEVER CHILLS: Primary | ICD-10-CM

## 2018-01-04 LAB
DEPRECATED S PYO AG THROAT QL EIA: NORMAL
FLUAV+FLUBV AG SPEC QL: NEGATIVE
FLUAV+FLUBV AG SPEC QL: NEGATIVE
SPECIMEN SOURCE: NORMAL
SPECIMEN SOURCE: NORMAL

## 2018-01-04 PROCEDURE — 87081 CULTURE SCREEN ONLY: CPT | Performed by: PHYSICIAN ASSISTANT

## 2018-01-04 PROCEDURE — 87880 STREP A ASSAY W/OPTIC: CPT | Performed by: PHYSICIAN ASSISTANT

## 2018-01-04 PROCEDURE — 87804 INFLUENZA ASSAY W/OPTIC: CPT | Performed by: INTERNAL MEDICINE

## 2018-01-04 NOTE — PROGRESS NOTES
Results released to [employer]:  Hi Agustin,  Your sed rate has normalized. However CRP is still elevated. I recommend that you see the infectious disease specialist to get an opinion.     Sincerely    Axel Dodge MD  [facility]

## 2018-01-05 LAB
BACTERIA SPEC CULT: NORMAL
SPECIMEN SOURCE: NORMAL

## 2018-01-06 ENCOUNTER — OFFICE VISIT (OUTPATIENT)
Dept: URGENT CARE | Facility: URGENT CARE | Age: 28
End: 2018-01-06
Payer: COMMERCIAL

## 2018-01-06 VITALS
HEART RATE: 84 BPM | DIASTOLIC BLOOD PRESSURE: 88 MMHG | RESPIRATION RATE: 25 BRPM | SYSTOLIC BLOOD PRESSURE: 127 MMHG | TEMPERATURE: 97.8 F

## 2018-01-06 DIAGNOSIS — J03.90 ACUTE TONSILLITIS, UNSPECIFIED ETIOLOGY: Primary | ICD-10-CM

## 2018-01-06 PROCEDURE — 99213 OFFICE O/P EST LOW 20 MIN: CPT | Performed by: INTERNAL MEDICINE

## 2018-01-06 RX ORDER — AMOXICILLIN 875 MG
875 TABLET ORAL 2 TIMES DAILY
Qty: 14 TABLET | Refills: 0 | Status: SHIPPED | OUTPATIENT
Start: 2018-01-06 | End: 2018-01-13

## 2018-01-06 NOTE — PROGRESS NOTES
SUBJECTIVE:  Agustin Ortega, a 27 year old female, presents for evaluation of fever, sore throat, ear pain.  Became ill 3 days ago with temp up to 102. Had strep and flu done which were negative.  Also noticing some white spots on the right tonsil.  Pain is also most prominent on the right side.  Extending into the right ear. No cough. No runny nose or congestion.  Minimal abdominal pain.  No nausea.    OBJECTIVE:  /88  Pulse 84  Temp 97.8  F (36.6  C) (Oral)  Resp 25  GENERAL: healthy, alert and no distress  EYES: conjunctivae and sclerae normal  HENT: ear canals and TM's normal and OP with posterior erythema and exudate, ulceration on the right tonsil with more intense erythema and tonsillar enlargement  NECK: no adenopathy, no asymmetry, masses, or scars and thyroid normal to palpation  RESP: clear to auscultation and percussion bilaterally; normal I:E ratio  CV: regular rates and rhythm, normal S1 S2, no S3 or S4 and no murmur, click or rub -  SKIN: no suspicious lesions or rashes    ASSESSMENT/PLAN:    ICD-10-CM    1. Acute tonsillitis, unspecified etiology J03.90 amoxicillin (AMOXIL) 875 MG tablet       Ronaldo Pan MD

## 2018-01-06 NOTE — NURSING NOTE
"Chief Complaint   Patient presents with     Throat Pain     throat pain, right ear pain for two days and white spot on back of throat which was noticed today.        Initial /88  Pulse 84  Temp 97.8  F (36.6  C) (Oral)  Resp 25 Estimated body mass index is 32.59 kg/(m^2) as calculated from the following:    Height as of 1/2/18: 5' 1\" (1.549 m).    Weight as of 1/2/18: 172 lb 8 oz (78.2 kg).  Medication Reconciliation: complete    "

## 2018-01-06 NOTE — MR AVS SNAPSHOT
After Visit Summary   1/6/2018    Agustin Ortega    MRN: [id number]           Patient Information     Date Of Birth          1990        Visit Information        Provider Department      1/6/2018 10:45 AM Ronaldo Pan MD [facility]        Today's Diagnoses     Acute tonsillitis, unspecified etiology    -  1       Follow-ups after your visit        Who to contact     If you have questions or need follow up information about today's clinic visit or your schedule please contact [facility] directly at [phone].  Normal or non-critical lab and imaging results will be communicated to you by MyChart, letter or phone within 4 business days after the clinic has received the results. If you do not hear from us within 7 days, please contact the clinic through [employer] or phone. If you have a critical or abnormal lab result, we will notify you by phone as soon as possible.  Submit refill requests through [employer] or call your pharmacy and they will forward the refill request to us. Please allow 3 business days for your refill to be completed.          Additional Information About Your Visit        MyChart Information     [employer] gives you secure access to your electronic health record. If you see a primary care provider, you can also send messages to your care team and make appointments. If you have questions, please call your primary care clinic.  If you do not have a primary care provider, please call [phone] and they will assist you.        Care EveryWhere ID     This is your Care EveryWhere ID. This could be used by other organizations to access your [place] medical records  [id number]        Your Vitals Were     Pulse Temperature Respirations             84 97.8  F (36.6  C) (Oral) 25          Blood Pressure from Last 3 Encounters:   01/06/18 127/88   01/02/18 122/82   12/13/17 122/82    Weight from Last 3 Encounters:    01/02/18 172 lb 8 oz (78.2 kg)   12/13/17 171 lb 3.2 oz (77.7 kg)   11/30/17 175 lb 8 oz (79.6 kg)              Today, you had the following     No orders found for display         Today's Medication Changes          These changes are accurate as of: 1/6/18 11:01 AM.  If you have any questions, ask your nurse or doctor.               Start taking these medicines.        Dose/Directions    amoxicillin 875 MG tablet   Commonly known as:  AMOXIL   Used for:  Acute tonsillitis, unspecified etiology   Started by:  Ronaldo Pan MD        Dose:  875 mg   Take 1 tablet (875 mg) by mouth 2 times daily for 7 days   Quantity:  14 tablet   Refills:  0            Where to get your medicines      These medications were sent to [facility] [address] [address]     Phone:  [phone]     amoxicillin 875 MG tablet                Primary Care Provider Office Phone # Fax #    Kourtney Marivel Junior [phone]       [address] [facility] [address]        Equal Access to Services     [facility] AH: Hadii aad ku hadasho Soomaali, waaxda luqadaha, qaybta kaalmada adeegyada, waxay diandrain hayronitn juan manuel nunez . So [facility] [phone].    ATENCIÓN: Si habla español, tiene a sheets disposición servicios gratuitos de asistencia lingüística. Llame al [phone].    We comply with applicable federal civil rights laws and [place] laws. We do not discriminate on the basis of race, color, national origin, age, disability, sex, sexual orientation, or gender identity.            Thank you!     Thank you for choosing [facility]  for your care. Our goal is always to provide you with excellent care. Hearing back from our patients is one way we can continue to improve our services. Please take a few minutes to complete the written survey that you may receive in the mail after your visit with us. Thank  you!             Your Updated Medication List - Protect others around you: Learn how to safely use, store and throw away your medicines at www.disposemymeds.org.          This list is accurate as of: 1/6/18 11:01 AM.  Always use your most recent med list.                   Brand Name Dispense Instructions for use Diagnosis    amoxicillin 875 MG tablet    AMOXIL    14 tablet    Take 1 tablet (875 mg) by mouth 2 times daily for 7 days    Acute tonsillitis, unspecified etiology       cholecalciferol 1000 UNIT tablet    vitamin D3     Take 1,000 Units by mouth        cyanocobalamin 1000 MCG tablet    vitamin  B-12    30 tablet    Take 1 tablet (1,000 mcg) by mouth daily        fish oil-omega-3 fatty acids 1000 MG capsule           ibuprofen 200 MG capsule     120 capsule    Take 200 mg by mouth every 4 hours as needed for fever        MULTIVITAL PO           TYLENOL 500 MG tablet   Generic drug:  acetaminophen      Take 1-2 tablets (500-1,000 mg) by mouth every 6 hours as needed for mild pain

## 2018-02-15 DIAGNOSIS — R30.0 DYSURIA: Primary | ICD-10-CM

## 2018-02-15 LAB
ALBUMIN UR-MCNC: NEGATIVE MG/DL
APPEARANCE UR: ABNORMAL
BACTERIA #/AREA URNS HPF: ABNORMAL /HPF
BILIRUB UR QL STRIP: NEGATIVE
COLOR UR AUTO: YELLOW
GLUCOSE UR STRIP-MCNC: NEGATIVE MG/DL
HGB UR QL STRIP: ABNORMAL
KETONES UR STRIP-MCNC: NEGATIVE MG/DL
LEUKOCYTE ESTERASE UR QL STRIP: NEGATIVE
MUCOUS THREADS #/AREA URNS LPF: PRESENT /LPF
NITRATE UR QL: NEGATIVE
NON-SQ EPI CELLS #/AREA URNS LPF: ABNORMAL /LPF
PH UR STRIP: 5.5 PH (ref 5–7)
RBC #/AREA URNS AUTO: ABNORMAL /HPF
SOURCE: ABNORMAL
SP GR UR STRIP: >1.03 (ref 1–1.03)
SPECIMEN SOURCE: NORMAL
UROBILINOGEN UR STRIP-ACNC: 0.2 EU/DL (ref 0.2–1)
WBC #/AREA URNS AUTO: ABNORMAL /HPF
WET PREP SPEC: NORMAL

## 2018-02-15 PROCEDURE — 81001 URINALYSIS AUTO W/SCOPE: CPT | Performed by: PHYSICIAN ASSISTANT

## 2018-02-15 PROCEDURE — 87210 SMEAR WET MOUNT SALINE/INK: CPT | Performed by: PHYSICIAN ASSISTANT

## 2018-02-16 NOTE — PROGRESS NOTES
Patient would like self wet prep and UA to diagnose dysuria.    Has been on antibiotics recently.      Negative wet prep.   UA with small amount of bacteria.  Culture pending.       Advised OTC probiotic.  Possible OTC topical anti yeast treatment should symptoms persist.  F/U with GYN or PCP should symptoms worsen.     Patient expresses understanding and agreement with the assessment and plan as above.

## 2018-04-02 ENCOUNTER — OFFICE VISIT (OUTPATIENT)
Dept: INTERNAL MEDICINE | Facility: CLINIC | Age: 28
End: 2018-04-02
Payer: COMMERCIAL

## 2018-04-02 VITALS
WEIGHT: 164 LBS | TEMPERATURE: 98.2 F | HEIGHT: 61 IN | HEART RATE: 97 BPM | BODY MASS INDEX: 30.96 KG/M2 | OXYGEN SATURATION: 97 % | DIASTOLIC BLOOD PRESSURE: 74 MMHG | SYSTOLIC BLOOD PRESSURE: 118 MMHG | RESPIRATION RATE: 14 BRPM

## 2018-04-02 DIAGNOSIS — Z00.00 ROUTINE GENERAL MEDICAL EXAMINATION AT A HEALTH CARE FACILITY: Primary | ICD-10-CM

## 2018-04-02 DIAGNOSIS — N92.6 IRREGULAR PERIODS: ICD-10-CM

## 2018-04-02 DIAGNOSIS — N89.8 VAGINAL DISCHARGE: ICD-10-CM

## 2018-04-02 LAB
ALBUMIN SERPL-MCNC: 3.7 G/DL (ref 3.4–5)
ALP SERPL-CCNC: 67 U/L (ref 40–150)
ALT SERPL W P-5'-P-CCNC: 15 U/L (ref 0–50)
ANION GAP SERPL CALCULATED.3IONS-SCNC: 4 MMOL/L (ref 3–14)
AST SERPL W P-5'-P-CCNC: 19 U/L (ref 0–45)
BILIRUB SERPL-MCNC: 0.3 MG/DL (ref 0.2–1.3)
BUN SERPL-MCNC: 5 MG/DL (ref 7–30)
CALCIUM SERPL-MCNC: 8.7 MG/DL (ref 8.5–10.1)
CHLORIDE SERPL-SCNC: 108 MMOL/L (ref 94–109)
CHOLEST SERPL-MCNC: 118 MG/DL
CO2 SERPL-SCNC: 28 MMOL/L (ref 20–32)
CREAT SERPL-MCNC: 0.45 MG/DL (ref 0.52–1.04)
ERYTHROCYTE [DISTWIDTH] IN BLOOD BY AUTOMATED COUNT: 13.2 % (ref 10–15)
GFR SERPL CREATININE-BSD FRML MDRD: >90 ML/MIN/1.7M2
GLUCOSE SERPL-MCNC: 85 MG/DL (ref 70–99)
HCT VFR BLD AUTO: 37.5 % (ref 35–47)
HDLC SERPL-MCNC: 38 MG/DL
HGB BLD-MCNC: 12.2 G/DL (ref 11.7–15.7)
LDLC SERPL CALC-MCNC: 69 MG/DL
MCH RBC QN AUTO: 27.8 PG (ref 26.5–33)
MCHC RBC AUTO-ENTMCNC: 32.5 G/DL (ref 31.5–36.5)
MCV RBC AUTO: 85 FL (ref 78–100)
NONHDLC SERPL-MCNC: 80 MG/DL
PLATELET # BLD AUTO: 187 10E9/L (ref 150–450)
POTASSIUM SERPL-SCNC: 3.7 MMOL/L (ref 3.4–5.3)
PROT SERPL-MCNC: 6.9 G/DL (ref 6.8–8.8)
RBC # BLD AUTO: 4.39 10E12/L (ref 3.8–5.2)
SODIUM SERPL-SCNC: 140 MMOL/L (ref 133–144)
SPECIMEN SOURCE: NORMAL
TRIGL SERPL-MCNC: 56 MG/DL
TSH SERPL DL<=0.005 MIU/L-ACNC: 1.46 MU/L (ref 0.4–4)
WBC # BLD AUTO: 3.4 10E9/L (ref 4–11)
WET PREP SPEC: NORMAL

## 2018-04-02 PROCEDURE — 87210 SMEAR WET MOUNT SALINE/INK: CPT | Performed by: INTERNAL MEDICINE

## 2018-04-02 PROCEDURE — 36415 COLL VENOUS BLD VENIPUNCTURE: CPT | Performed by: INTERNAL MEDICINE

## 2018-04-02 PROCEDURE — 84443 ASSAY THYROID STIM HORMONE: CPT | Performed by: INTERNAL MEDICINE

## 2018-04-02 PROCEDURE — 80061 LIPID PANEL: CPT | Performed by: INTERNAL MEDICINE

## 2018-04-02 PROCEDURE — 99385 PREV VISIT NEW AGE 18-39: CPT | Performed by: INTERNAL MEDICINE

## 2018-04-02 PROCEDURE — G0145 SCR C/V CYTO,THINLAYER,RESCR: HCPCS | Performed by: INTERNAL MEDICINE

## 2018-04-02 PROCEDURE — 85027 COMPLETE CBC AUTOMATED: CPT | Performed by: INTERNAL MEDICINE

## 2018-04-02 PROCEDURE — 80053 COMPREHEN METABOLIC PANEL: CPT | Performed by: INTERNAL MEDICINE

## 2018-04-02 NOTE — MR AVS SNAPSHOT
After Visit Summary   4/2/2018    Agustin Ortega    MRN: [id number]           Patient Information     Date Of Birth          1990        Visit Information        Provider Department      4/2/2018 7:20 AM Kourtney Junior MD [facility]        Today's Diagnoses     Routine general medical examination at a health care facility    -  1    Irregular periods          Care Instructions      Plan:  1. Prenatal vitamins  2. Labs today     I recommend to consider attending the free classes regarding Health Directives. They are offered by [place] at different locations.   Contact Information: [facility]    Phone: [phone], Fax: [phone]  [email or web address]          Preventive Health Recommendations  Female Ages 26 - 39  Yearly exam:   See your health care provider every year in order to    Review health changes.     Discuss preventive care.      Review your medicines if you your doctor has prescribed any.    Until age 30: Get a Pap test every three years (more often if you have had an abnormal result).    After age 30: Talk to your doctor about whether you should have a Pap test every 3 years or have a Pap test with HPV screening every 5 years.   You do not need a Pap test if your uterus was removed (hysterectomy) and you have not had cancer.  You should be tested each year for STDs (sexually transmitted diseases), if you're at risk.   Talk to your provider about how often to have your cholesterol checked.  If you are at risk for diabetes, you should have a diabetes test (fasting glucose).  Shots: Get a flu shot each year. Get a tetanus shot every 10 years.   Nutrition:     Eat at least 5 servings of fruits and vegetables each day.    Eat whole-grain bread, whole-wheat pasta and brown rice instead of white grains and rice.    Talk to your provider about Calcium and Vitamin D.     Lifestyle    Exercise at least 150 minutes a week (30 minutes a day, 5 days  "of the week). This will help you control your weight and prevent disease.    Limit alcohol to one drink per day.    No smoking.     Wear sunscreen to prevent skin cancer.    See your dentist every six months for an exam and cleaning.            Follow-ups after your visit        Who to contact     If you have questions or need follow up information about today's clinic visit or your schedule please contact [facility] directly at [phone].  Normal or non-critical lab and imaging results will be communicated to you by [email or web address], letter or phone within 4 business days after the clinic has received the results. If you do not hear from us within 7 days, please contact the clinic through SecureDB or phone. If you have a critical or abnormal lab result, we will notify you by phone as soon as possible.  Submit refill requests through SecureDB or call your pharmacy and they will forward the refill request to us. Please allow 3 business days for your refill to be completed.          Additional Information About Your Visit        SecureDB Information     SecureDB gives you secure access to your electronic health record. If you see a primary care provider, you can also send messages to your care team and make appointments. If you have questions, please call your primary care clinic.  If you do not have a primary care provider, please call [phone] and they will assist you.        Care EveryWhere ID     This is your Care EveryWhere ID. This could be used by other organizations to access your [facility] medical records  [id number]        Your Vitals Were     Pulse Temperature Respirations Height Last Period Pulse Oximetry    97 98.2  F (36.8  C) (Oral) 14 5' 1\" (1.549 m) 03/11/2018 97%    Breastfeeding? BMI (Body Mass Index)                No 30.99 kg/m2           Blood Pressure from Last 3 Encounters:   04/02/18 118/74   01/06/18 127/88   01/02/18 122/82    Weight from Last 3 Encounters:   04/02/18 164 lb " (74.4 kg)   01/02/18 172 lb 8 oz (78.2 kg)   12/13/17 171 lb 3.2 oz (77.7 kg)              We Performed the Following     CBC with platelets     Comprehensive metabolic panel     Lipid panel reflex to direct LDL Fasting     TSH with free T4 reflex        Primary Care Provider Office Phone # Fax #    Kourtney Junior [phone]       [address] [facility] [address]        Equal Access to Services     CHRISTIE VALLES : Hadii aad ku hadasho Soomaali, waaxda luqadaha, qaybta kaalmada adeegyada, waxay idiin hayaan adeeg kharagallito laissac . So [facility] [phone].    ATENCIÓN: Si habla español, tiene a sheets disposición servicios gratuitos de asistencia lingüística. [facility] [phone].    We comply with applicable federal civil rights laws and [place] laws. We do not discriminate on the basis of race, color, national origin, age, disability, sex, sexual orientation, or gender identity.            Thank you!     Thank you for choosing [facility]  for your care. Our goal is always to provide you with excellent care. Hearing back from our patients is one way we can continue to improve our services. Please take a few minutes to complete the written survey that you may receive in the mail after your visit with us. Thank you!             Your Updated Medication List - Protect others around you: Learn how to safely use, store and throw away your medicines at www.disposemymeds.org.          This list is accurate as of 4/2/18  7:49 AM.  Always use your most recent med list.                   Brand Name Dispense Instructions for use Diagnosis    cholecalciferol 1000 UNIT tablet    vitamin D3     Take 1,000 Units by mouth        cyanocobalamin 1000 MCG tablet    vitamin  B-12    30 tablet    Take 1 tablet (1,000 mcg) by mouth daily        fish oil-omega-3 fatty acids 1000 MG capsule           ibuprofen 200 MG capsule     120 capsule    Take 200 mg by mouth every 4 hours as needed  for fever        MULTIVITAL PO           TYLENOL 500 MG tablet   Generic drug:  acetaminophen      Take 1-2 tablets (500-1,000 mg) by mouth every 6 hours as needed for mild pain

## 2018-04-02 NOTE — PROGRESS NOTES
Dr Polo's note    Patient's instructions / PLAN:                                                        Plan:  1. Prenatal vitamins  2. Labs today         ASSESSMENT & PLAN:                                                      (Z00.00) Routine general medical examination at a health care facility  (primary encounter diagnosis)  Comment:   Plan: CBC with platelets, Comprehensive metabolic         panel, Lipid panel reflex to direct LDL         Fasting, TSH with free T4 reflex, Wet prep, Pap        imaged thin layer screen reflex to HPV if ASCUS        - recommend age 25 - 29, CANCELED: SCREENING         PAP SMEAR, CANCELED: SCREENING PAP SMEAR            (N92.6) Irregular periods  Comment:   Plan: TSH with free T4 reflex          Vag discharge - do wet prep       Chief Complaint:                                                        Annual exam    SUBJECTIVE:                                                    History of present illness     Planning to get pregnant this summer and try for children right away.   Advance directives    ROS:   General: Negative for fever, chills, major weight changes, fatigue  Skin: Negative for rashes, abnormal spots  Eyes: Negative for blurred or double vision  ENT/mouth: Negative for sinuses discomfort, earache, sore throat  Respiratory: Negative for cough, wheezes, chronic lung disease  Cardiovascular: Negative for rest or exertional chest pain, shortness of breath, palpitations, leg edema,   Gastrointestinal: Negative for vomiting, abdominal pain, heartburn, blood in stool, diarrhea, constipation  Genitourinary: Negative for urinary frequency, blood in urine, history of kidney stones  Female: Negative for abnormal vaginal bleeding, vaginal discharge  Neuro: Negative for headaches, numbness, tingling, weakness in arms or legs, history of seizure, recent syncope  Psychiatry: Negative for depression, anxiety, suicidal thoughts  Endo: Negative for known thyroid disease,  diabetes.  Hemato/Lymph: Negative for nodes, easy bleeding, history of DVT, blood transfusion  Musculoskeletal: Negative for joint swelling, back pain      PMHx: - reviewed  History reviewed. No pertinent past medical history.    PSHx: reviewed  Past Surgical History:   Procedure Laterality Date     ENT SURGERY          Soc Hx: No daily alcohol, no smoking  Social History     Social History     Marital status: Single     Spouse name: N/A     Number of children: N/A     Years of education: N/A     Occupational History     Not on file.     Social History Main Topics     Smoking status: Never Smoker     Smokeless tobacco: Never Used     Alcohol use No     Drug use: No     Sexual activity: Yes     Partners: Male     Other Topics Concern     Parent/Sibling W/ Cabg, Mi Or Angioplasty Before 65f 55m? No     Social History Narrative        Fam Hx: reviewed  Family History   Problem Relation Age of Onset     DIABETES Father      Hypertension Father      Hyperlipidemia Father      CEREBROVASCULAR DISEASE Father 60     CANCER Maternal Grandmother      CANCER Maternal Grandfather      DIABETES Brother 31     Type 2         Screening: reviewed      All: reviewed    Meds: reviewed  Current Outpatient Prescriptions   Medication Sig Dispense Refill     acetaminophen (TYLENOL) 500 MG tablet Take 1-2 tablets (500-1,000 mg) by mouth every 6 hours as needed for mild pain       ibuprofen 200 MG capsule Take 200 mg by mouth every 4 hours as needed for fever 120 capsule      cyanocobalamin (VITAMIN  B-12) 1000 MCG tablet Take 1 tablet (1,000 mcg) by mouth daily 30 tablet 0     cholecalciferol (VITAMIN D3) 1000 UNIT tablet Take 1,000 Units by mouth       fish oil-omega-3 fatty acids 1000 MG capsule        Multiple Vitamins-Minerals (MULTIVITAL PO)          OBJECTIVE:                                                    Physical Exam :  Blood pressure 118/74, pulse 97, temperature 98.2  F (36.8  C), temperature source Oral, resp. rate 14,  "height 5' 1\" (1.549 m), weight 164 lb (74.4 kg), last menstrual period 03/11/2018, SpO2 97 %, not currently breastfeeding.     NAD, appears comfortable  Skin clear, no rashes  HEENT: PERRLA, EOMI, anicteric sclera, pink conjunctiva, external ears appear normal, bilateral tympanic membranes clinically normal, oropharynx normal color.  Neck: supple, no JVD,  no thyroidmegaly  Lymph nodes non palpable in the cervical, supraclavicular axillaries, inguinal areas  Chest: clear to auscultation with good respiratory effort  Cardiac: S1S2, RRR, no mgr appreciated  Abdomen: soft, not tender, not distended, audible bowel sound, no hepatosplenomegaly, no palpable masses, no abdominal bruits  Extremities: no cyanosis, clubbing or edema.   Neuro: A, Ox3, no focal signs.  Breast exam in supine and erect position: they are symmetrical, no skin changes, no tenderness or nodes on palpation. Nipples are erect, no skin lesions, no discharge on pressure.    Pelvic exam: Normal external genitals, normal appearing perineum, normal appearing urethra,  vaginal mucosa pink, no discharge, Cervix appears normal, Pap smear obtained. On bimanual exam, I did not feel any uterus or ovarian masses, and she denies any tenderness.         Kourtney Polo MD  Internal Medicine        SUBJECTIVE:   CC: Agustin Ortega is an 27 year old woman who presents for preventive health visit.     Healthy Habits:    Do you get at least three servings of calcium containing foods daily (dairy, green leafy vegetables, etc.)? yes    Amount of exercise or daily activities, outside of work: on and off    Problems taking medications regularly No    Medication side effects: No    Have you had an eye exam in the past two years? no    Do you see a dentist twice per year? yes    Do you have sleep apnea, excessive snoring or daytime drowsiness?unknown, does not sleep well          Today's PHQ-2 Score:   PHQ-2 ( 1999 Pf[employer]) 11/30/2017 10/7/2015   Q1: Little interest or " "pleasure in doing things 0 0   Q2: Feeling down, depressed or hopeless 0 0   PHQ-2 Score 0 0       Abuse: Current or Past(Physical, Sexual or Emotional)- No  Do you feel safe in your environment - Yes    Social History   Substance Use Topics     Smoking status: Never Smoker     Smokeless tobacco: Never Used     Alcohol use No     If you drink alcohol do you typically have >3 drinks per day or >7 drinks per week? No                     Reviewed orders with patient.  Reviewed health maintenance and updated orders accordingly - Yes          Pertinent mammograms are reviewed under the imaging tab.  History of abnormal Pap smear:     Reviewed and updated as needed this visit by clinical staff  Tobacco  Allergies  Meds  Med Hx  Surg Hx  Fam Hx  Soc Hx        Reviewed and updated as needed this visit by Provider            OBJECTIVE:   /74 (BP Location: Left arm, Patient Position: Sitting, Cuff Size: Adult Large)  Pulse 97  Temp 98.2  F (36.8  C) (Oral)  Resp 14  Ht 5' 1\" (1.549 m)  Wt 164 lb (74.4 kg)  LMP 03/11/2018  SpO2 97%  Breastfeeding? No  BMI 30.99 kg/m2      COUNSELING:   Reviewed preventive health counseling, as reflected in patient instructions       Regular exercise       Healthy diet/nutrition         reports that she has never smoked. She has never used smokeless tobacco.    Estimated body mass index is 30.99 kg/(m^2) as calculated from the following:    Height as of this encounter: 5' 1\" (1.549 m).    Weight as of this encounter: 164 lb (74.4 kg).   Weight management plan: Discussed healthy diet and exercise guidelines and patient will follow up in 12 months in clinic to re-evaluate.    Counseling Resources:  ATP IV Guidelines  Pooled Cohorts Equation Calculator  Breast Cancer Risk Calculator  FRAX Risk Assessment  ICSI Preventive Guidelines  Dietary Guidelines for Americans, 2010  USDA's MyPlate  ASA Prophylaxis  Lung CA Screening    Kourtney Junior MD  [facility] " [place]

## 2018-04-02 NOTE — PATIENT INSTRUCTIONS
Plan:  1. Prenatal vitamins  2. Labs today     I recommend to consider attending the free classes regarding Health Directives. They are offered by [facility] at different locations.   Contact Information: [facility]    Phone: [phone], Fax: [phone]  [email or web address]          Preventive Health Recommendations  Female Ages 26 - 39  Yearly exam:   See your health care provider every year in order to    Review health changes.     Discuss preventive care.      Review your medicines if you your doctor has prescribed any.    Until age 30: Get a Pap test every three years (more often if you have had an abnormal result).    After age 30: Talk to your doctor about whether you should have a Pap test every 3 years or have a Pap test with HPV screening every 5 years.   You do not need a Pap test if your uterus was removed (hysterectomy) and you have not had cancer.  You should be tested each year for STDs (sexually transmitted diseases), if you're at risk.   Talk to your provider about how often to have your cholesterol checked.  If you are at risk for diabetes, you should have a diabetes test (fasting glucose).  Shots: Get a flu shot each year. Get a tetanus shot every 10 years.   Nutrition:     Eat at least 5 servings of fruits and vegetables each day.    Eat whole-grain bread, whole-wheat pasta and brown rice instead of white grains and rice.    Talk to your provider about Calcium and Vitamin D.     Lifestyle    Exercise at least 150 minutes a week (30 minutes a day, 5 days of the week). This will help you control your weight and prevent disease.    Limit alcohol to one drink per day.    No smoking.     Wear sunscreen to prevent skin cancer.    See your dentist every six months for an exam and cleaning.

## 2018-04-02 NOTE — NURSING NOTE
"Chief Complaint   Patient presents with     Physical       Initial /74 (BP Location: Left arm, Patient Position: Sitting, Cuff Size: Adult Large)  Pulse 97  Temp 98.2  F (36.8  C) (Oral)  Resp 14  Ht 5' 1\" (1.549 m)  Wt 164 lb (74.4 kg)  LMP 03/11/2018  SpO2 97%  Breastfeeding? No  BMI 30.99 kg/m2 Estimated body mass index is 30.99 kg/(m^2) as calculated from the following:    Height as of this encounter: 5' 1\" (1.549 m).    Weight as of this encounter: 164 lb (74.4 kg).  Medication Reconciliation: complete   Sandoval ABRAHAM      "

## 2018-04-04 LAB
COPATH REPORT: NORMAL
PAP: NORMAL

## 2018-04-06 ENCOUNTER — MYC MEDICAL ADVICE (OUTPATIENT)
Dept: INTERNAL MEDICINE | Facility: CLINIC | Age: 28
End: 2018-04-06

## 2018-05-22 ENCOUNTER — OFFICE VISIT (OUTPATIENT)
Dept: URGENT CARE | Facility: URGENT CARE | Age: 28
End: 2018-05-22
Payer: COMMERCIAL

## 2018-05-22 VITALS
DIASTOLIC BLOOD PRESSURE: 72 MMHG | HEART RATE: 77 BPM | OXYGEN SATURATION: 95 % | TEMPERATURE: 97.6 F | SYSTOLIC BLOOD PRESSURE: 112 MMHG

## 2018-05-22 DIAGNOSIS — R42 DIZZINESS: ICD-10-CM

## 2018-05-22 DIAGNOSIS — R07.89 ATYPICAL CHEST PAIN: Primary | ICD-10-CM

## 2018-05-22 DIAGNOSIS — R53.83 FATIGUE, UNSPECIFIED TYPE: ICD-10-CM

## 2018-05-22 LAB
ALBUMIN UR-MCNC: NEGATIVE MG/DL
APPEARANCE UR: CLEAR
BACTERIA #/AREA URNS HPF: ABNORMAL /HPF
BILIRUB UR QL STRIP: NEGATIVE
COLOR UR AUTO: YELLOW
ERYTHROCYTE [DISTWIDTH] IN BLOOD BY AUTOMATED COUNT: 12.8 % (ref 10–15)
GLUCOSE UR STRIP-MCNC: NEGATIVE MG/DL
HCT VFR BLD AUTO: 38 % (ref 35–47)
HGB BLD-MCNC: 12.4 G/DL (ref 11.7–15.7)
HGB UR QL STRIP: NEGATIVE
KETONES UR STRIP-MCNC: NEGATIVE MG/DL
LEUKOCYTE ESTERASE UR QL STRIP: NEGATIVE
MCH RBC QN AUTO: 28.3 PG (ref 26.5–33)
MCHC RBC AUTO-ENTMCNC: 32.6 G/DL (ref 31.5–36.5)
MCV RBC AUTO: 87 FL (ref 78–100)
NITRATE UR QL: NEGATIVE
NON-SQ EPI CELLS #/AREA URNS LPF: ABNORMAL /LPF
PH UR STRIP: 6 PH (ref 5–7)
PLATELET # BLD AUTO: 211 10E9/L (ref 150–450)
RBC # BLD AUTO: 4.38 10E12/L (ref 3.8–5.2)
RBC #/AREA URNS AUTO: ABNORMAL /HPF
SOURCE: ABNORMAL
SP GR UR STRIP: <=1.005 (ref 1–1.03)
UROBILINOGEN UR STRIP-ACNC: 0.2 EU/DL (ref 0.2–1)
WBC # BLD AUTO: 4.3 10E9/L (ref 4–11)
WBC #/AREA URNS AUTO: ABNORMAL /HPF
YEAST #/AREA URNS HPF: ABNORMAL /HPF

## 2018-05-22 PROCEDURE — 87088 URINE BACTERIA CULTURE: CPT | Performed by: FAMILY MEDICINE

## 2018-05-22 PROCEDURE — 93000 ELECTROCARDIOGRAM COMPLETE: CPT | Performed by: FAMILY MEDICINE

## 2018-05-22 PROCEDURE — 85027 COMPLETE CBC AUTOMATED: CPT | Performed by: FAMILY MEDICINE

## 2018-05-22 PROCEDURE — 81001 URINALYSIS AUTO W/SCOPE: CPT | Performed by: FAMILY MEDICINE

## 2018-05-22 PROCEDURE — 99214 OFFICE O/P EST MOD 30 MIN: CPT | Performed by: FAMILY MEDICINE

## 2018-05-22 PROCEDURE — 36415 COLL VENOUS BLD VENIPUNCTURE: CPT | Performed by: FAMILY MEDICINE

## 2018-05-22 PROCEDURE — 87086 URINE CULTURE/COLONY COUNT: CPT | Performed by: FAMILY MEDICINE

## 2018-05-22 PROCEDURE — 82306 VITAMIN D 25 HYDROXY: CPT | Performed by: FAMILY MEDICINE

## 2018-05-22 RX ORDER — MECLIZINE HYDROCHLORIDE 25 MG/1
25 TABLET ORAL EVERY 6 HOURS PRN
Qty: 30 TABLET | Refills: 0 | Status: SHIPPED | OUTPATIENT
Start: 2018-05-22 | End: 2018-07-30

## 2018-05-22 NOTE — PROGRESS NOTES
SUBJECTIVE:   Agustin Ortega is a 27 year old female presenting with a chief complaint of headache, dizzy, shaky, chest pain.    Symptoms present for the past 1 month, patient here today as she will be changing jobs and wants to make sure before there is a break in her insurance.  Patient states that symptoms are brief and intermittent.  Dizziness is not full out vertigo, more of an off balance sensation.  Denies any tinnitus or headache associated with the dizziness.  Patient states that dizziness will last for couple of minutes, unable to figure out what triggers it or position changes worsen.  Does not like to take a lot of medications and symptoms are brief.  Denies palpitations or SOB.    Patient also developed right sided sternal chest pain, again these are brief in nature and will last for a few minutes.  Rates pain 2/10 when it occurs and will go away without intervention.  Patient with history of GERD but states that this is not the same as she had burning sensation with GERD.  Patient states that will get left sided chest pain also.  Theses symptoms have been about 1 month in duration.    Patient complains of persistent fatigue, headache and poor appetite.  Patient has seen Rheumatology and had workup which was unrevealing.  Patient states that her headaches are not the same as her migraine headaches, usually treat with rest and ibuprofen.  Patient had eye check and was told that is normal.  Patient states that has low vit D, takes supplementation intermittently.    Family Hx - dad - HTN DM, hyperlipidemia and CVA, brother with diabetes    No past medical history on file.  Current Outpatient Prescriptions   Medication Sig Dispense Refill     acetaminophen (TYLENOL) 500 MG tablet Take 1-2 tablets (500-1,000 mg) by mouth every 6 hours as needed for mild pain       cholecalciferol (VITAMIN D3) 1000 UNIT tablet Take 1,000 Units by mouth       cyanocobalamin (VITAMIN  B-12) 1000 MCG tablet Take 1 tablet  (1,000 mcg) by mouth daily 30 tablet 0     fish oil-omega-3 fatty acids 1000 MG capsule        ibuprofen 200 MG capsule Take 200 mg by mouth every 4 hours as needed for fever 120 capsule      meclizine (ANTIVERT) 25 MG tablet Take 1 tablet (25 mg) by mouth every 6 hours as needed for dizziness 30 tablet 0     Multiple Vitamins-Minerals (MULTIVITAL PO)        Social History   Substance Use Topics     Smoking status: Never Smoker     Smokeless tobacco: Never Used     Alcohol use No       ROS:  Review of systems negative except as stated above.    OBJECTIVE:  /72 (BP Location: Right arm, Patient Position: Chair, Cuff Size: Adult Regular)  Pulse 77  Temp 97.6  F (36.4  C) (Tympanic)  SpO2 95%  GENERAL APPEARANCE: healthy, alert and no distress  EYES: EOMI,  PERRL, conjunctiva clear  HENT: ear canals and TM's normal.  Nose and mouth without ulcers, erythema or lesions  NECK: supple, nontender, no lymphadenopathy  RESP: lungs clear to auscultation - no rales, rhonchi or wheezes.  Mild lateral sternal tenderness on palpation  CV: regular rates and rhythm, normal S1 S2, no murmur noted  ABDOMEN:  soft, nontender, no HSM or masses and bowel sounds normal  Extremities: no peripheral edema or tenderness, peripheral pulses normal  PSYCH: mentation appears normal and affect normal/bright    EKG - NSR, rate 61, no ST changes    Results for orders placed or performed in visit on 05/22/18   CBC with platelets   Result Value Ref Range    WBC 4.3 4.0 - 11.0 10e9/L    RBC Count 4.38 3.8 - 5.2 10e12/L    Hemoglobin 12.4 11.7 - 15.7 g/dL    Hematocrit 38.0 35.0 - 47.0 %    MCV 87 78 - 100 fl    MCH 28.3 26.5 - 33.0 pg    MCHC 32.6 31.5 - 36.5 g/dL    RDW 12.8 10.0 - 15.0 %    Platelet Count 211 150 - 450 10e9/L   UA with Microscopic reflex to Culture   Result Value Ref Range    Color Urine Yellow     Appearance Urine Clear     Glucose Urine Negative NEG^Negative mg/dL    Bilirubin Urine Negative NEG^Negative    Ketones Urine  Negative NEG^Negative mg/dL    Specific Gravity Urine <=1.005 1.003 - 1.035    pH Urine 6.0 5.0 - 7.0 pH    Protein Albumin Urine Negative NEG^Negative mg/dL    Urobilinogen Urine 0.2 0.2 - 1.0 EU/dL    Nitrite Urine Negative NEG^Negative    Blood Urine Negative NEG^Negative    Leukocyte Esterase Urine Negative NEG^Negative    Source Midstream Urine     WBC Urine 0 - 5 OTO5^0 - 5 /HPF    RBC Urine O - 2 OTO2^O - 2 /HPF    Squamous Epithelial /LPF Urine Moderate (A) FEW^Few /LPF    Bacteria Urine Moderate (A) NEG^Negative /HPF    Yeast Urine Few (A) NEG^Negative /HPF   '    ASSESSMENT/PLAN:  (R07.89) Atypical chest pain  (primary encounter diagnosis)  Plan: EKG 12-lead complete w/read - Clinics            (R53.83) Fatigue, unspecified type  Plan: CBC with platelets, Vitamin D Deficiency, UA         with Microscopic reflex to Culture, Urine         Culture Aerobic Bacterial            (R42) Dizziness  Plan: EKG 12-lead complete w/read - Clinics, CBC with        platelets, Vitamin D Deficiency, meclizine         (ANTIVERT) 25 MG tablet              Reviewed results, reassurance given and encourage to monitor.  Patient with 1 month history of brief intermittent symptoms of dizziness, chest pain which are not related together.  Discussed that can be due to different etiology and coincidental.    Reviewed atypical chest pain possible due to costochondritis due to discomfort on palpation of chest wall/sternum and recommend NSAIDS.  The EKG was normal but discussed that would not be able to exclude cardiac etiology as cause of dizziness just with EKG and did not seem as if dizziness occurred when chest pain occurred.  Due to history of GERD, discussed that brief sharp pain may be due to esophageal spasm, encourage to restart omeprazole daily for 2 weeks.    Discussed dizziness due to inner ear etiology.  Patient does not endorse complete vertigo symptoms and more of off balance sensation.  Encourage plenty of fluids and  monitor for now.  RX meclizine given if develops more symptoms.  Reviewed that will need to see ENT if symptoms persists.  Will obtain vit D level to see if this is contributing to dizziness.    Obtain UA at patient's request, no symptoms consistent with UTI.  Patient had pap screen which showed yeast and as patient states that symptoms improved with eating yogurt, had decline treatment.  Will monitor for now and treat if develops symptoms.    follow up with primary provider if no resolution of symptoms.

## 2018-05-22 NOTE — PATIENT INSTRUCTIONS
Okay to take ibuprofen 200 mg - 4 tablets (800 mg) every 8 hours as needed.  Okay to take tylenol 500 mg - 2 tablets (1000 mg) every 6-8 hours as needed, do not exceed 3000 mg in 24 hours.  Take omeprazole 20 mg daily for at least 2 weeks.      Costochondritis    Costochondritis is inflammation of a rib or the cartilage that connects a rib to your breastbone (sternum). It causes tenderness, and sometimes chest pain may be sharp or aching, or it may feel like pressure. Pain may get worse with deep breathing, movement, or exercise. In some cases, the pain is mistaken for a heart attack. Despite this, the condition is not serious. Read on to learn more about the condition and how it can be treated.  What causes costochondritis?  The cause of costochondritis is not completely clear, but it may happen after a chest injury, chest infection, or coughing episode. Some physical activities can sometimes lead to costochondritis. Large-breasted women may be more likely to have the condition. Often, the reason for the inflammation is unknown.  Diagnosing costochondritis  There is no test for costochondritis. The condition is diagnosed by the symptoms you have. Your healthcare provider will perform a physical exam. He or she will ask you about your symptoms and examine your chest for tenderness. In some cases, tests are done to rule out more serious problems. These tests may include imaging tests such as chest X-ray, CT scan, or an ECG.  Treating costochondritis  If an underlying cause is found, treatment for that will likely relieve the problem. Costochondritis often goes away on its own. The course of the condition varies from person to person. It usually lasts from weeks to months. In some cases, mild symptoms continue for months to years. To ease symptoms:    Take medicine as directed. These relieve pain and swelling. Ibuprofen or other NSAIDs are often recommended. In some cases, you may be given prescription medicine, such  as muscle relaxants.    Avoid activities that put stress on the chest or spine.    Apply a heating pad (set to warm, not too high, heat) to the breastbone several times a day.    Perform stretching exercises as directed.  Call the healthcare provider right away if you have any of the following:    Pain that is not relieved by medicine    Shortness of breath    Lightheadedness, dizziness, or fainting    Feeling of irregular heartbeat or fast pulse  Anyone with chest pain should see a healthcare provider, especially those who are older and may be at risk for heart disease.   Date Last Reviewed: 10/1/2016    1729-4400 [employer]. [address] [place] [address]65. All rights reserved. This information is not intended as a substitute for professional medical care. Always follow your healthcare professional's instructions.        Dizziness (Uncertain Cause)  Dizziness is a common symptom. It may be described as lightheadedness, spinning, or feeling like you are going to faint. Dizziness can have many causes.  Be sure to tell the healthcare provider about:    All medicines you take, including prescription, over-the-counter, herbs, and supplements    Any other symptoms you have    Any health problems you are being treated for    Any past major health problems you've had, such as a heart attack, balance issues, hearing problems, or blood pressure problems    Anything that causes the dizziness to get worse or better  Today's exam did not show an exact cause for your dizziness. Other tests may be needed. Follow up with your healthcare provider.  Home care    Dizziness that occurs with sudden standing may be a sign of mild dehydration. Drink extra fluids for the next few days.    If you recently started a new medicine, stopped a medicine, or had the dose of a current medicine changed, talk with the prescribing healthcare provider. Your medicine plan may need adjustment.    If dizziness lasts more than a  few seconds, sit or lie down until it passes. This may help prevent injury in case you pass out. Get up slowly when you feel better.    Don't drive or use power tools or dangerous equipment until you have had no dizziness for at least 48 hours.  Follow-up care  Follow up with your healthcare provider for further evaluation within the next 7 days or as advised.  When to seek medical advice  Call your healthcare provider for any of the following:    Worsening of symptoms or new symptoms    Passing out or seizure    Repeated vomiting    Headache    Palpitations (the sense that your heart is fluttering or beating fast or hard)    Shortness of breath    Blood in vomit or stool (black or red color)    Weakness of an arm or leg or 1 side of the face    Vision or hearing changes    Trouble walking or speaking    Chest, arm, neck, back, or jaw pain  Date Last Reviewed: 11/1/2017 2000-2017 [employer]. [address] [place] [address]. All rights reserved. This information is not intended as a substitute for professional medical care. Always follow your healthcare professional's instructions.        Inner Ear Problems: Causes of Dizziness (Vertigo)       Benign positional vertigo (BPV)  This is the most common cause of vertigo. BPV is also called benign positional paroxysmal vertigo (BPPV). It happens when crystals in the ear canals shift into the wrong place. Vertigo usually occurs when you move your head in a certain way. This can happen when turning in bed, bending, or looking up. Because BPV comes on quickly, you should think about if you are safe to drive or do other tasks that need your full attention.  BPV:    Causes vertigo that last for seconds. Vertigo can occur several times a day, depending on body position.    Doesn t cause hearing loss    Often goes away on its own. But it but may go away sooner with treatment.  Infection or inflammation  Sometimes the semicircular canals swell and send  incorrect balance signals. This problem may be caused by a viral infection. Depending on the cause, your hearing can be affected (labyrinthitis). Or your hearing can remain normal (neuronitis).  Infection or inflammation:    Causes vertigo that lasts for hours or days. The first episode is usually the worst.    Can cause hearing loss    Often goes away on its own. But it may go away sooner with treatment.  You may need vestibular rehabilitation if you have balance problems that don't go away.  Meniere s disease  This condition is uncommon. It happens when there is too much fluid in the ear canals. This causes increased pressure and swelling. It affects balance and hearing signals.  Meniere s disease may:    Cause vertigo that last for hours    Cause hearing problems that come and go. The problems are usually in one ear and get worse over time.    Cause buzzing or ringing in the ears (tinnitus)    Cause a feeling of fullness or pressure in the ear    Cause any of these symptoms: vertigo, hearing loss, tinnitus, or ear fullness to last a lifetime  Date Last Reviewed: 11/1/2016 2000-2017 [employer]. [address] [place] [address]. All rights reserved. This information is not intended as a substitute for professional medical care. Always follow your healthcare professional's instructions.

## 2018-05-22 NOTE — MR AVS SNAPSHOT
After Visit Summary   5/22/2018    Agustin Ortega    MRN: [id number]           Patient Information     Date Of Birth          1990        Visit Information        Provider Department      5/22/2018 3:25 PM Nehemiah Lala MD [facility]        Today's Diagnoses     Atypical chest pain    -  1    Fatigue, unspecified type        Dizziness          Care Instructions    Okay to take ibuprofen 200 mg - 4 tablets (800 mg) every 8 hours as needed.  Okay to take tylenol 500 mg - 2 tablets (1000 mg) every 6-8 hours as needed, do not exceed 3000 mg in 24 hours.  Take omeprazole 20 mg daily for at least 2 weeks.      Costochondritis    Costochondritis is inflammation of a rib or the cartilage that connects a rib to your breastbone (sternum). It causes tenderness, and sometimes chest pain may be sharp or aching, or it may feel like pressure. Pain may get worse with deep breathing, movement, or exercise. In some cases, the pain is mistaken for a heart attack. Despite this, the condition is not serious. Read on to learn more about the condition and how it can be treated.  What causes costochondritis?  The cause of costochondritis is not completely clear, but it may happen after a chest injury, chest infection, or coughing episode. Some physical activities can sometimes lead to costochondritis. Large-breasted women may be more likely to have the condition. Often, the reason for the inflammation is unknown.  Diagnosing costochondritis  There is no test for costochondritis. The condition is diagnosed by the symptoms you have. Your healthcare provider will perform a physical exam. He or she will ask you about your symptoms and examine your chest for tenderness. In some cases, tests are done to rule out more serious problems. These tests may include imaging tests such as chest X-ray, CT scan, or an ECG.  Treating costochondritis  If an underlying cause is found, treatment for that will likely  relieve the problem. Costochondritis often goes away on its own. The course of the condition varies from person to person. It usually lasts from weeks to months. In some cases, mild symptoms continue for months to years. To ease symptoms:    Take medicine as directed. These relieve pain and swelling. Ibuprofen or other NSAIDs are often recommended. In some cases, you may be given prescription medicine, such as muscle relaxants.    Avoid activities that put stress on the chest or spine.    Apply a heating pad (set to warm, not too high, heat) to the breastbone several times a day.    Perform stretching exercises as directed.  Call the healthcare provider right away if you have any of the following:    Pain that is not relieved by medicine    Shortness of breath    Lightheadedness, dizziness, or fainting    Feeling of irregular heartbeat or fast pulse  Anyone with chest pain should see a healthcare provider, especially those who are older and may be at risk for heart disease.   Date Last Reviewed: 10/1/2016    1302-7318 [employer]. [address] [place] [address]. All rights reserved. This information is not intended as a substitute for professional medical care. Always follow your healthcare professional's instructions.        Dizziness (Uncertain Cause)  Dizziness is a common symptom. It may be described as lightheadedness, spinning, or feeling like you are going to faint. Dizziness can have many causes.  Be sure to tell the healthcare provider about:    All medicines you take, including prescription, over-the-counter, herbs, and supplements    Any other symptoms you have    Any health problems you are being treated for    Any past major health problems you've had, such as a heart attack, balance issues, hearing problems, or blood pressure problems    Anything that causes the dizziness to get worse or better  Today's exam did not show an exact cause for your dizziness. Other tests may be  needed. Follow up with your healthcare provider.  Home care    Dizziness that occurs with sudden standing may be a sign of mild dehydration. Drink extra fluids for the next few days.    If you recently started a new medicine, stopped a medicine, or had the dose of a current medicine changed, talk with the prescribing healthcare provider. Your medicine plan may need adjustment.    If dizziness lasts more than a few seconds, sit or lie down until it passes. This may help prevent injury in case you pass out. Get up slowly when you feel better.    Don't drive or use power tools or dangerous equipment until you have had no dizziness for at least 48 hours.  Follow-up care  Follow up with your healthcare provider for further evaluation within the next 7 days or as advised.  When to seek medical advice  Call your healthcare provider for any of the following:    Worsening of symptoms or new symptoms    Passing out or seizure    Repeated vomiting    Headache    Palpitations (the sense that your heart is fluttering or beating fast or hard)    Shortness of breath    Blood in vomit or stool (black or red color)    Weakness of an arm or leg or 1 side of the face    Vision or hearing changes    Trouble walking or speaking    Chest, arm, neck, back, or jaw pain  Date Last Reviewed: 11/1/2017 2000-2017 [employer]. [address] [place] [address]. All rights reserved. This information is not intended as a substitute for professional medical care. Always follow your healthcare professional's instructions.        Inner Ear Problems: Causes of Dizziness (Vertigo)       Benign positional vertigo (BPV)  This is the most common cause of vertigo. BPV is also called benign positional paroxysmal vertigo (BPPV). It happens when crystals in the ear canals shift into the wrong place. Vertigo usually occurs when you move your head in a certain way. This can happen when turning in bed, bending, or looking up. Because  BPV comes on quickly, you should think about if you are safe to drive or do other tasks that need your full attention.  BPV:    Causes vertigo that last for seconds. Vertigo can occur several times a day, depending on body position.    Doesn t cause hearing loss    Often goes away on its own. But it but may go away sooner with treatment.  Infection or inflammation  Sometimes the semicircular canals swell and send incorrect balance signals. This problem may be caused by a viral infection. Depending on the cause, your hearing can be affected (labyrinthitis). Or your hearing can remain normal (neuronitis).  Infection or inflammation:    Causes vertigo that lasts for hours or days. The first episode is usually the worst.    Can cause hearing loss    Often goes away on its own. But it may go away sooner with treatment.  You may need vestibular rehabilitation if you have balance problems that don't go away.  Meniere s disease  This condition is uncommon. It happens when there is too much fluid in the ear canals. This causes increased pressure and swelling. It affects balance and hearing signals.  Meniere s disease may:    Cause vertigo that last for hours    Cause hearing problems that come and go. The problems are usually in one ear and get worse over time.    Cause buzzing or ringing in the ears (tinnitus)    Cause a feeling of fullness or pressure in the ear    Cause any of these symptoms: vertigo, hearing loss, tinnitus, or ear fullness to last a lifetime  Date Last Reviewed: 11/1/2016 2000-2017 [employer]. [address] [place] [address]. All rights reserved. This information is not intended as a substitute for professional medical care. Always follow your healthcare professional's instructions.                Follow-ups after your visit        Who to contact     If you have questions or need follow up information about today's clinic visit or your schedule please contact [facility] directly at [phone].  Normal or non-critical lab and imaging results will be communicated to you by [facility], letter or phone within 4 business days after the clinic has received the results. If you do not hear from us within 7 days, please contact the clinic through [facility] or phone. If you have a critical or abnormal lab result, we will notify you by phone as soon as possible.  Submit refill requests through [employer] or call your pharmacy and they will forward the refill request to us. Please allow 3 business days for your refill to be completed.          Additional Information About Your Visit        Explain My [facility] Information     [employer] gives you secure access to your electronic health record. If you see a primary care provider, you can also send messages to your care team and make appointments. If you have questions, please call your primary care clinic.  If you do not have a primary care provider, please call [phone] and they will assist you.        Care EveryWhere ID     This is your Care EveryWhere ID. This could be used by other organizations to access your [employer] medical records  [id number]        Your Vitals Were     Pulse Temperature Pulse Oximetry             77 97.6  F (36.4  C) (Tympanic) 95%          Blood Pressure from Last 3 Encounters:   05/22/18 112/72   04/02/18 118/74   01/06/18 127/88    Weight from Last 3 Encounters:   04/02/18 164 lb (74.4 kg)   01/02/18 172 lb 8 oz (78.2 kg)   12/13/17 171 lb 3.2 oz (77.7 kg)              We Performed the Following     CBC with platelets     EKG 12-lead complete w/read - Clinics     UA with Microscopic reflex to Culture     Vitamin D Deficiency          Today's Medication Changes          These changes are accurate as of 5/22/18  5:04 PM.  If you have any questions, ask your nurse or doctor.               Start taking these medicines.        Dose/Directions    meclizine 25 MG tablet   Commonly known as:  ANTIVERT   Used for:  Dizziness         Dose:  25 mg   Take 1 tablet (25 mg) by mouth every 6 hours as needed for dizziness   Quantity:  30 tablet   Refills:  0            Where to get your medicines      Some of these will need a paper prescription and others can be bought over the counter.  Ask your nurse if you have questions.     Bring a paper prescription for each of these medications     meclizine 25 MG tablet                Primary Care Provider Office Phone # Fax #    Kourtney Marivel Junior [phone]       [address] [facility] [address]        Equal Access to Services     [facility]: Hadii aad ku hadasho Soomaali, waaxda luqadaha, qaybta kaalmada adeegyaisaac, solomon nunez . So [facility] [phone].    ATENCIÓN: Si habla español, tiene a sheets disposición servicios gratuitos de asistencia lingüística. [facility] [phone].    We comply with applicable federal civil rights laws and [place] laws. We do not discriminate on the basis of race, color, national origin, age, disability, sex, sexual orientation, or gender identity.            Thank you!     Thank you for choosing [facility]  for your care. Our goal is always to provide you with excellent care. Hearing back from our patients is one way we can continue to improve our services. Please take a few minutes to complete the written survey that you may receive in the mail after your visit with us. Thank you!             Your Updated Medication List - Protect others around you: Learn how to safely use, store and throw away your medicines at www.disposemymeds.org.          This list is accurate as of 5/22/18  5:04 PM.  Always use your most recent med list.                   Brand Name Dispense Instructions for use Diagnosis    cholecalciferol 1000 UNIT tablet    vitamin D3     Take 1,000 Units by mouth        cyanocobalamin 1000 MCG tablet    vitamin  B-12    30 tablet    Take 1 tablet (1,000 mcg) by mouth daily         fish oil-omega-3 fatty acids 1000 MG capsule           ibuprofen 200 MG capsule     120 capsule    Take 200 mg by mouth every 4 hours as needed for fever        meclizine 25 MG tablet    ANTIVERT    30 tablet    Take 1 tablet (25 mg) by mouth every 6 hours as needed for dizziness    Dizziness       MULTIVITAL PO           TYLENOL 500 MG tablet   Generic drug:  acetaminophen      Take 1-2 tablets (500-1,000 mg) by mouth every 6 hours as needed for mild pain

## 2018-05-23 LAB — DEPRECATED CALCIDIOL+CALCIFEROL SERPL-MC: 25 UG/L (ref 20–75)

## 2018-05-24 LAB
BACTERIA SPEC CULT: ABNORMAL
SPECIMEN SOURCE: ABNORMAL

## 2018-05-25 ENCOUNTER — NURSE TRIAGE (OUTPATIENT)
Dept: NURSING | Facility: CLINIC | Age: 28
End: 2018-05-25

## 2018-05-25 NOTE — TELEPHONE ENCOUNTER
Patient is returning Urgent Care call regarding urine culture results.   Reviewed the following note with patient from 5/24/18:    Malcom Dewey,     The urine culture is positive for Group B strep, this bacteria is a colonizer in the urogenital area and only needs to be treated if symptomatic consistent with a bladder infection.  The nurse can call in a prescription for amoxicillin if you desire to be treated for a bladder infection.     Patient denies symptoms at this time. Declines treatment. Stating she will call back if symptoms change.    Laurie Treadwell RN  [facility]        Patient called back through [facility] stating she is getting calls/messages today at 10:30 from [facility]  regarding results but she already got the results from triage nurse as noted above. Called [facility]  and confirmed they are not calling out to the patient, might be lab calling but she has all the answers she needed, no other concerns at this time.     Azalia Marquez RN, BSN  [facility]

## 2018-07-30 ENCOUNTER — OFFICE VISIT (OUTPATIENT)
Dept: URGENT CARE | Facility: URGENT CARE | Age: 28
End: 2018-07-30
Payer: COMMERCIAL

## 2018-07-30 VITALS
SYSTOLIC BLOOD PRESSURE: 102 MMHG | TEMPERATURE: 98.6 F | HEART RATE: 100 BPM | OXYGEN SATURATION: 98 % | DIASTOLIC BLOOD PRESSURE: 62 MMHG

## 2018-07-30 DIAGNOSIS — J06.9 VIRAL UPPER RESPIRATORY TRACT INFECTION: Primary | ICD-10-CM

## 2018-07-30 DIAGNOSIS — R11.2 NAUSEA AND VOMITING, INTRACTABILITY OF VOMITING NOT SPECIFIED, UNSPECIFIED VOMITING TYPE: ICD-10-CM

## 2018-07-30 LAB
ALBUMIN UR-MCNC: NEGATIVE MG/DL
APPEARANCE UR: CLEAR
BACTERIA SPEC CULT: NORMAL
BASOPHILS # BLD AUTO: 0 10E9/L (ref 0–0.2)
BASOPHILS NFR BLD AUTO: 0.4 %
BETA HCG QUAL IFA URINE: NEGATIVE
BILIRUB UR QL STRIP: NEGATIVE
COLOR UR AUTO: YELLOW
DIFFERENTIAL METHOD BLD: NORMAL
EOSINOPHIL # BLD AUTO: 0.1 10E9/L (ref 0–0.7)
EOSINOPHIL NFR BLD AUTO: 0.9 %
ERYTHROCYTE [DISTWIDTH] IN BLOOD BY AUTOMATED COUNT: 13 % (ref 10–15)
GLUCOSE UR STRIP-MCNC: NEGATIVE MG/DL
HCT VFR BLD AUTO: 37.7 % (ref 35–47)
HGB BLD-MCNC: 12.8 G/DL (ref 11.7–15.7)
HGB UR QL STRIP: NEGATIVE
KETONES UR STRIP-MCNC: NEGATIVE MG/DL
LEUKOCYTE ESTERASE UR QL STRIP: NEGATIVE
LYMPHOCYTES # BLD AUTO: 0.9 10E9/L (ref 0.8–5.3)
LYMPHOCYTES NFR BLD AUTO: 16.4 %
MCH RBC QN AUTO: 28.7 PG (ref 26.5–33)
MCHC RBC AUTO-ENTMCNC: 34 G/DL (ref 31.5–36.5)
MCV RBC AUTO: 85 FL (ref 78–100)
MONOCYTES # BLD AUTO: 0.4 10E9/L (ref 0–1.3)
MONOCYTES NFR BLD AUTO: 7.7 %
NEUTROPHILS # BLD AUTO: 4.2 10E9/L (ref 1.6–8.3)
NEUTROPHILS NFR BLD AUTO: 74.6 %
NITRATE UR QL: NEGATIVE
PH UR STRIP: 6 PH (ref 5–7)
PLATELET # BLD AUTO: 178 10E9/L (ref 150–450)
RBC # BLD AUTO: 4.46 10E12/L (ref 3.8–5.2)
RBC #/AREA URNS AUTO: NORMAL /HPF
SOURCE: NORMAL
SP GR UR STRIP: 1.02 (ref 1–1.03)
SPECIMEN SOURCE: NORMAL
UROBILINOGEN UR STRIP-ACNC: 0.2 EU/DL (ref 0.2–1)
WBC # BLD AUTO: 5.6 10E9/L (ref 4–11)
WBC #/AREA URNS AUTO: NORMAL /HPF

## 2018-07-30 PROCEDURE — 87086 URINE CULTURE/COLONY COUNT: CPT | Performed by: PHYSICIAN ASSISTANT

## 2018-07-30 PROCEDURE — 81001 URINALYSIS AUTO W/SCOPE: CPT | Performed by: PHYSICIAN ASSISTANT

## 2018-07-30 PROCEDURE — 36415 COLL VENOUS BLD VENIPUNCTURE: CPT | Performed by: PHYSICIAN ASSISTANT

## 2018-07-30 PROCEDURE — 85025 COMPLETE CBC W/AUTO DIFF WBC: CPT | Performed by: PHYSICIAN ASSISTANT

## 2018-07-30 PROCEDURE — 84703 CHORIONIC GONADOTROPIN ASSAY: CPT | Performed by: PHYSICIAN ASSISTANT

## 2018-07-30 PROCEDURE — 99213 OFFICE O/P EST LOW 20 MIN: CPT | Performed by: PHYSICIAN ASSISTANT

## 2018-07-30 RX ORDER — ONDANSETRON 8 MG/1
8 TABLET, ORALLY DISINTEGRATING ORAL EVERY 6 HOURS PRN
Qty: 20 TABLET | Refills: 0 | Status: SHIPPED | OUTPATIENT
Start: 2018-07-30 | End: 2018-12-13

## 2018-07-30 ASSESSMENT — ENCOUNTER SYMPTOMS
HEADACHES: 0
COUGH: 1
RHINORRHEA: 1
DIARRHEA: 0
FEVER: 0
SORE THROAT: 1
VOMITING: 0
NAUSEA: 0
CHILLS: 0
SHORTNESS OF BREATH: 0

## 2018-07-30 NOTE — LETTER
[facility]  [address]  [address]  [facility] [address]  Phone: [phone]  Fax: [phone]    July 30, 2018        Agustin Ortega  [address] [facility] [address]          To whom it may concern:    RE: Agustin Ortega    Patient was seen and treated today at our clinic and missed work. Please excuse her from work on 7/30/2018 - 7/31/2018.     Please contact me for questions or concerns.      Sincerely,        Sarai Muñoz PA-C

## 2018-07-30 NOTE — PATIENT INSTRUCTIONS
Viral Gastroenteritis (Adult)    Gastroenteritis is commonly called the stomach flu. It is most often caused by a virus that affects the stomach and intestinal tract and usually lasts from 2 to 7 days. Common viruses causing gastroenteritis include norovirus, rotavirus, and hepatitis A. Non-viral causes of gastroenteritis include bacteria, parasites, and toxins.  The danger from repeated vomiting or diarrhea is dehydration. This is the loss of too much fluid from the body. When this occurs, body fluids must be replaced. Antibiotics do not help with this illness because it is usually viral.Simple home treatment will be helpful.  Symptoms of viral gastroenteritis may include:    Watery, loose stools    Stomach pain or abdominal cramps    Fever and chills    Nausea and vomiting    Loss of bowel control    Headache  Home care  Gastroenteritis is transmitted by contact with the stool or vomit of an infected person. This can occur from person to person or from contact with a contaminated surface.  Follow these guidelines when caring for yourself at home:    If symptoms are severe, rest at home for the next 24 hours or until you are feeling better.    Wash your hands with soap and water or use alcohol-based  to prevent the spread of infection. Wash your hands after touching anyone who is sick.    Wash your hands or use alcohol-based  after using the toilet and before meals. Clean the toilet after each use.  Remember these tips when preparing food:    People with diarrhea should not prepare or serve food to others. When preparing foods, wash your hands before and after.    Wash your hands after using cutting boards, countertops, knives, or utensils that have been in contact with raw food.    Keep uncooked meats away from cooked and ready-to-eat foods.  Medicine  You may use acetaminophen or NSAID medicines like ibuprofen or naproxen to control fever unless another medicine was given. If you have chronic  liver or kidney disease, talk with your healthcare provider before using these medicines. Also talk with your provider if you've had a stomach ulcer or gastrointestinal bleeding. Don't give aspirin to anyone under 18 years of age who is ill with a fever. It may cause severe liver damage. Don't use NSAIDS is you are already taking one for another condition (like arthritis) or are on aspirin (such as for heart disease or after a stroke).  If medicine for vomiting or diarrhea are prescribed, take these only as directed. Do not take over-the-counter medicines for vomiting or diarrhea unless instructed by your healthcare provider.  Diet  Follow these guidelines for food:    Water and liquids are important so you don't get dehydrated. Drink a small amount at a time or suck on ice chips if you are vomiting.    If you eat, avoid fatty, greasy, spicy, or fried foods.    Don't eat dairy if you have diarrhea. This can make diarrhea worse.    Avoid tobacco, alcohol, and caffeine which may worsen symptoms.  During the first 24 hours (the first full day), follow the diet below:    Beverages. Sports drinks, soft drinks without caffeine, ginger ale, mineral water (plain or flavored), decaffeinated tea and coffee. If you are very dehydrated, sports drinks aren't a good choice. They have too much sugar and not enough electrolytes. In this case, commercially available products called oral rehydration solutions, are best.    Soups. Eat clear broth, consommé, and bouillon.    Desserts. Eat gelatin, popsicles, and fruit juice bars.  During the next 24 hours (the second day), you may add the following to the above:    Hot cereal, plain toast, bread, rolls, and crackers    Plain noodles, rice, mashed potatoes, chicken noodle or rice soup    Unsweetened canned fruit (avoid pineapple), bananas    Limit fat intake to less than 15 grams per day. Do this by avoiding margarine, butter, oils, mayonnaise, sauces, gravies, fried foods, peanut  butter, meat, poultry, and fish.    Limit fiber and avoid raw or cooked vegetables, fresh fruits (except bananas), and bran cereals.    Limit caffeine and chocolate. Don't use spices or seasonings other than salt.    Limit dairy products.    Avoid alcohol.  During the next 24 hours:    Gradually resume a normal diet as you feel better and your symptoms improve.    If at any time it starts getting worse again, go back to clear liquids until you feel better.  Follow-up care  Follow up with your healthcare provider, or as advised. Call your provider if you don't get better within 24 hours or if diarrhea lasts more than a week. Also follow up if you are unable to keep down liquids and get dehydrated. If a stool (diarrhea) sample was taken, call as directed for the results.  Call 911  Call 911 if any of these occur:    Trouble breathing    Chest pain    Confused    Severe drowsiness or trouble awakening    Fainting or loss of consciousness    Rapid heart rate    Seizure    Stiff neck  When to seek medical advice  Call your healthcare provider right away if any of these occur:    Abdominal pain that gets worse    Continued vomiting (unable to keep liquids down)    Frequent diarrhea (more than 5 times a day)    Blood in vomit or stool (black or red color)    Dark urine, reduced urine output, or extreme thirst    Weakness or dizziness    Drowsiness    Fever of 100.4 F (38 C) or higher, or as directed by your healthcare provider    New rash  Date Last Reviewed: 1/3/2016    9476-9275 [employer]. [address] [place] [address], [place] [address]. All rights reserved. This information is not intended as a substitute for professional medical care. Always follow your healthcare professional's instructions.

## 2018-07-30 NOTE — PROGRESS NOTES
SUBJECTIVE:   Agustin Ortega is a 27 year old female presenting with a chief complaint of   Chief Complaint   Patient presents with     Urgent Care     URI     bodyache, coughing, fever, vomiting        She is an established patient of [facility].    URI Adult    Onset of symptoms was 1 day(s) ago.  Course of illness is same.    Severity moderate  Current and Associated symptoms: fever, chills, runny nose, stuffy nose and cough - non-productive  Treatment measures tried include OTC Cough med.  Predisposing factors include None.  Patient works in health care    Gastro    Onset of symptoms was off and on one week(s) ago started with diarrhea. Has had 3 episodes of vomiting today.   Course of illness is same.    Severity moderately severe  Current and Associated symptoms:  nausea  Aggravating factors: nothing.    Alleviating factors:nothing  Diarrhea: Yes  multiple stools/day and is persisting  Stools: a few loose stools  Vomitting: Yes  3 times/day and is persisting  Appetite: decreased  Risk factors: none  Patient got  last week, would like a pregnancy test.     Review of Systems   Constitutional: Negative for chills and fever.   HENT: Positive for congestion, rhinorrhea and sore throat. Negative for ear pain.    Respiratory: Positive for cough. Negative for shortness of breath.    Gastrointestinal: Negative for diarrhea, nausea and vomiting.   Neurological: Negative for headaches.       No past medical history on file.  Family History   Problem Relation Age of Onset     Diabetes Father      Hypertension Father      Hyperlipidemia Father      Cerebrovascular Disease Father 60     Cancer Maternal Grandmother      Cancer Maternal Grandfather      Diabetes Brother 31     Type 2     Current Outpatient Prescriptions   Medication Sig Dispense Refill     acetaminophen (TYLENOL) 500 MG tablet Take 1-2 tablets (500-1,000 mg) by mouth every 6 hours as needed for mild pain       cholecalciferol (VITAMIN D3) 1000 UNIT  tablet Take 1,000 Units by mouth       cyanocobalamin (VITAMIN  B-12) 1000 MCG tablet Take 1 tablet (1,000 mcg) by mouth daily 30 tablet 0     fish oil-omega-3 fatty acids 1000 MG capsule        ibuprofen 200 MG capsule Take 200 mg by mouth every 4 hours as needed for fever 120 capsule      Multiple Vitamins-Minerals (MULTIVITAL PO)        ondansetron (ZOFRAN-ODT) 8 MG ODT tab Take 1 tablet (8 mg) by mouth every 6 hours as needed for nausea 20 tablet 0     Social History   Substance Use Topics     Smoking status: Never Smoker     Smokeless tobacco: Never Used     Alcohol use No       OBJECTIVE  /62 (BP Location: Right arm, Patient Position: Chair, Cuff Size: Adult Large)  Pulse 100  Temp 98.6  F (37  C) (Tympanic)  SpO2 98%    Physical Exam   Constitutional: She appears well-developed and well-nourished. No distress.   HENT:   Head: Normocephalic and atraumatic.   Right Ear: Tympanic membrane and external ear normal.   Left Ear: Tympanic membrane and external ear normal.   Nose: Rhinorrhea present.   Mouth/Throat: Uvula is midline. Posterior oropharyngeal erythema present.   Eyes: EOM are normal. Pupils are equal, round, and reactive to light.   Neck: Normal range of motion. Neck supple.   Pulmonary/Chest: Effort normal and breath sounds normal. No respiratory distress.   Lymphadenopathy:     She has no cervical adenopathy.   Neurological: She is alert. No cranial nerve deficit.   Skin: Skin is warm and dry. She is not diaphoretic.   Psychiatric: She has a normal mood and affect.   Nursing note and vitals reviewed.      Labs:  Results for orders placed or performed in visit on 07/30/18 (from the past 24 hour(s))   CBC with platelets and differential   Result Value Ref Range    WBC 5.6 4.0 - 11.0 10e9/L    RBC Count 4.46 3.8 - 5.2 10e12/L    Hemoglobin 12.8 11.7 - 15.7 g/dL    Hematocrit 37.7 35.0 - 47.0 %    MCV 85 78 - 100 fl    MCH 28.7 26.5 - 33.0 pg    MCHC 34.0 31.5 - 36.5 g/dL    RDW 13.0 10.0 - 15.0  %    Platelet Count 178 150 - 450 10e9/L    Diff Method Automated Method     % Neutrophils 74.6 %    % Lymphocytes 16.4 %    % Monocytes 7.7 %    % Eosinophils 0.9 %    % Basophils 0.4 %    Absolute Neutrophil 4.2 1.6 - 8.3 10e9/L    Absolute Lymphocytes 0.9 0.8 - 5.3 10e9/L    Absolute Monocytes 0.4 0.0 - 1.3 10e9/L    Absolute Eosinophils 0.1 0.0 - 0.7 10e9/L    Absolute Basophils 0.0 0.0 - 0.2 10e9/L   Beta HCG qual IFA urine   Result Value Ref Range    Beta HCG Qual IFA Urine Negative NEG^Negative      UA with Microscopic reflex to Culture   Result Value Ref Range    Color Urine Yellow     Appearance Urine Clear     Glucose Urine Negative NEG^Negative mg/dL    Bilirubin Urine Negative NEG^Negative    Ketones Urine Negative NEG^Negative mg/dL    Specific Gravity Urine 1.020 1.003 - 1.035    pH Urine 6.0 5.0 - 7.0 pH    Protein Albumin Urine Negative NEG^Negative mg/dL    Urobilinogen Urine 0.2 0.2 - 1.0 EU/dL    Nitrite Urine Negative NEG^Negative    Blood Urine Negative NEG^Negative    Leukocyte Esterase Urine Negative NEG^Negative    Source Midstream Urine     WBC Urine PENDING OTO5^0 - 5 /HPF    RBC Urine PENDING OTO2^O - 2 /HPF       X-Ray was not done.    ASSESSMENT / PLAN:  1. Viral upper respiratory tract infection  Supportive cares encouraged  Follow up in clinic if symptoms persist    2. Nausea and vomiting, intractability of vomiting not specified, unspecified vomiting type  Light diet  Fluids  Red flag items discussed  - Beta HCG qual IFA urine  - CBC with platelets and differential  - Urine Culture Aerobic Bacterial  - UA with Microscopic reflex to Culture  - ondansetron (ZOFRAN-ODT) 8 MG ODT tab; Take 1 tablet (8 mg) by mouth every 6 hours as needed for nausea  Dispense: 20 tablet; Refill: 0      Followup:    If not improving or if condition worsens, follow up with your Primary Care Provider  Sarai Muñoz PA-C    Patient Instructions     Viral Gastroenteritis (Adult)    Gastroenteritis is  commonly called the stomach flu. It is most often caused by a virus that affects the stomach and intestinal tract and usually lasts from 2 to 7 days. Common viruses causing gastroenteritis include norovirus, rotavirus, and hepatitis A. Non-viral causes of gastroenteritis include bacteria, parasites, and toxins.  The danger from repeated vomiting or diarrhea is dehydration. This is the loss of too much fluid from the body. When this occurs, body fluids must be replaced. Antibiotics do not help with this illness because it is usually viral.Simple home treatment will be helpful.  Symptoms of viral gastroenteritis may include:    Watery, loose stools    Stomach pain or abdominal cramps    Fever and chills    Nausea and vomiting    Loss of bowel control    Headache  Home care  Gastroenteritis is transmitted by contact with the stool or vomit of an infected person. This can occur from person to person or from contact with a contaminated surface.  Follow these guidelines when caring for yourself at home:    If symptoms are severe, rest at home for the next 24 hours or until you are feeling better.    Wash your hands with soap and water or use alcohol-based  to prevent the spread of infection. Wash your hands after touching anyone who is sick.    Wash your hands or use alcohol-based  after using the toilet and before meals. Clean the toilet after each use.  Remember these tips when preparing food:    People with diarrhea should not prepare or serve food to others. When preparing foods, wash your hands before and after.    Wash your hands after using cutting boards, countertops, knives, or utensils that have been in contact with raw food.    Keep uncooked meats away from cooked and ready-to-eat foods.  Medicine  You may use acetaminophen or NSAID medicines like ibuprofen or naproxen to control fever unless another medicine was given. If you have chronic liver or kidney disease, talk with your healthcare  provider before using these medicines. Also talk with your provider if you've had a stomach ulcer or gastrointestinal bleeding. Don't give aspirin to anyone under 18 years of age who is ill with a fever. It may cause severe liver damage. Don't use NSAIDS is you are already taking one for another condition (like arthritis) or are on aspirin (such as for heart disease or after a stroke).  If medicine for vomiting or diarrhea are prescribed, take these only as directed. Do not take over-the-counter medicines for vomiting or diarrhea unless instructed by your healthcare provider.  Diet  Follow these guidelines for food:    Water and liquids are important so you don't get dehydrated. Drink a small amount at a time or suck on ice chips if you are vomiting.    If you eat, avoid fatty, greasy, spicy, or fried foods.    Don't eat dairy if you have diarrhea. This can make diarrhea worse.    Avoid tobacco, alcohol, and caffeine which may worsen symptoms.  During the first 24 hours (the first full day), follow the diet below:    Beverages. Sports drinks, soft drinks without caffeine, ginger ale, mineral water (plain or flavored), decaffeinated tea and coffee. If you are very dehydrated, sports drinks aren't a good choice. They have too much sugar and not enough electrolytes. In this case, commercially available products called oral rehydration solutions, are best.    Soups. Eat clear broth, consommé, and bouillon.    Desserts. Eat gelatin, popsicles, and fruit juice bars.  During the next 24 hours (the second day), you may add the following to the above:    Hot cereal, plain toast, bread, rolls, and crackers    Plain noodles, rice, mashed potatoes, chicken noodle or rice soup    Unsweetened canned fruit (avoid pineapple), bananas    Limit fat intake to less than 15 grams per day. Do this by avoiding margarine, butter, oils, mayonnaise, sauces, gravies, fried foods, peanut butter, meat, poultry, and fish.    Limit fiber and  avoid raw or cooked vegetables, fresh fruits (except bananas), and bran cereals.    Limit caffeine and chocolate. Don't use spices or seasonings other than salt.    Limit dairy products.    Avoid alcohol.  During the next 24 hours:    Gradually resume a normal diet as you feel better and your symptoms improve.    If at any time it starts getting worse again, go back to clear liquids until you feel better.  Follow-up care  Follow up with your healthcare provider, or as advised. Call your provider if you don't get better within 24 hours or if diarrhea lasts more than a week. Also follow up if you are unable to keep down liquids and get dehydrated. If a stool (diarrhea) sample was taken, call as directed for the results.  Call 911  Call 911 if any of these occur:    Trouble breathing    Chest pain    Confused    Severe drowsiness or trouble awakening    Fainting or loss of consciousness    Rapid heart rate    Seizure    Stiff neck  When to seek medical advice  Call your healthcare provider right away if any of these occur:    Abdominal pain that gets worse    Continued vomiting (unable to keep liquids down)    Frequent diarrhea (more than 5 times a day)    Blood in vomit or stool (black or red color)    Dark urine, reduced urine output, or extreme thirst    Weakness or dizziness    Drowsiness    Fever of 100.4 F (38 C) or higher, or as directed by your healthcare provider    New rash  Date Last Reviewed: 1/3/2016    9663-7939 [employer]. [address] [place] [address], [place] [address]. All rights reserved. This information is not intended as a substitute for professional medical care. Always follow your healthcare professional's instructions.

## 2018-07-30 NOTE — MR AVS SNAPSHOT
After Visit Summary   7/30/2018    Agustin Ortega    MRN: [id number]           Patient Information     Date Of Birth          1990        Visit Information        Provider Department      7/30/2018 1:00 PM Sarai Muñoz PA-C [facility]        Today's Diagnoses     Fever in adult    -  1    Nausea and vomiting, intractability of vomiting not specified, unspecified vomiting type          Care Instructions      Viral Gastroenteritis (Adult)    Gastroenteritis is commonly called the stomach flu. It is most often caused by a virus that affects the stomach and intestinal tract and usually lasts from 2 to 7 days. Common viruses causing gastroenteritis include norovirus, rotavirus, and hepatitis A. Non-viral causes of gastroenteritis include bacteria, parasites, and toxins.  The danger from repeated vomiting or diarrhea is dehydration. This is the loss of too much fluid from the body. When this occurs, body fluids must be replaced. Antibiotics do not help with this illness because it is usually viral.Simple home treatment will be helpful.  Symptoms of viral gastroenteritis may include:    Watery, loose stools    Stomach pain or abdominal cramps    Fever and chills    Nausea and vomiting    Loss of bowel control    Headache  Home care  Gastroenteritis is transmitted by contact with the stool or vomit of an infected person. This can occur from person to person or from contact with a contaminated surface.  Follow these guidelines when caring for yourself at home:    If symptoms are severe, rest at home for the next 24 hours or until you are feeling better.    Wash your hands with soap and water or use alcohol-based  to prevent the spread of infection. Wash your hands after touching anyone who is sick.    Wash your hands or use alcohol-based  after using the toilet and before meals. Clean the toilet after each use.  Remember these tips when preparing  food:    People with diarrhea should not prepare or serve food to others. When preparing foods, wash your hands before and after.    Wash your hands after using cutting boards, countertops, knives, or utensils that have been in contact with raw food.    Keep uncooked meats away from cooked and ready-to-eat foods.  Medicine  You may use acetaminophen or NSAID medicines like ibuprofen or naproxen to control fever unless another medicine was given. If you have chronic liver or kidney disease, talk with your healthcare provider before using these medicines. Also talk with your provider if you've had a stomach ulcer or gastrointestinal bleeding. Don't give aspirin to anyone under 18 years of age who is ill with a fever. It may cause severe liver damage. Don't use NSAIDS is you are already taking one for another condition (like arthritis) or are on aspirin (such as for heart disease or after a stroke).  If medicine for vomiting or diarrhea are prescribed, take these only as directed. Do not take over-the-counter medicines for vomiting or diarrhea unless instructed by your healthcare provider.  Diet  Follow these guidelines for food:    Water and liquids are important so you don't get dehydrated. Drink a small amount at a time or suck on ice chips if you are vomiting.    If you eat, avoid fatty, greasy, spicy, or fried foods.    Don't eat dairy if you have diarrhea. This can make diarrhea worse.    Avoid tobacco, alcohol, and caffeine which may worsen symptoms.  During the first 24 hours (the first full day), follow the diet below:    Beverages. Sports drinks, soft drinks without caffeine, ginger ale, mineral water (plain or flavored), decaffeinated tea and coffee. If you are very dehydrated, sports drinks aren't a good choice. They have too much sugar and not enough electrolytes. In this case, commercially available products called oral rehydration solutions, are best.    Soups. Eat clear broth, consommé, and  bouillon.    Desserts. Eat gelatin, popsicles, and fruit juice bars.  During the next 24 hours (the second day), you may add the following to the above:    Hot cereal, plain toast, bread, rolls, and crackers    Plain noodles, rice, mashed potatoes, chicken noodle or rice soup    Unsweetened canned fruit (avoid pineapple), bananas    Limit fat intake to less than 15 grams per day. Do this by avoiding margarine, butter, oils, mayonnaise, sauces, gravies, fried foods, peanut butter, meat, poultry, and fish.    Limit fiber and avoid raw or cooked vegetables, fresh fruits (except bananas), and bran cereals.    Limit caffeine and chocolate. Don't use spices or seasonings other than salt.    Limit dairy products.    Avoid alcohol.  During the next 24 hours:    Gradually resume a normal diet as you feel better and your symptoms improve.    If at any time it starts getting worse again, go back to clear liquids until you feel better.  Follow-up care  Follow up with your healthcare provider, or as advised. Call your provider if you don't get better within 24 hours or if diarrhea lasts more than a week. Also follow up if you are unable to keep down liquids and get dehydrated. If a stool (diarrhea) sample was taken, call as directed for the results.  Call 911  Call 911 if any of these occur:    Trouble breathing    Chest pain    Confused    Severe drowsiness or trouble awakening    Fainting or loss of consciousness    Rapid heart rate    Seizure    Stiff neck  When to seek medical advice  Call your healthcare provider right away if any of these occur:    Abdominal pain that gets worse    Continued vomiting (unable to keep liquids down)    Frequent diarrhea (more than 5 times a day)    Blood in vomit or stool (black or red color)    Dark urine, reduced urine output, or extreme thirst    Weakness or dizziness    Drowsiness    Fever of 100.4 F (38 C) or higher, or as directed by your healthcare provider    New rash  Date Last  Reviewed: 1/3/2016    8399-3196 [employer]. [address] [place] [address], [place] [address]. All rights reserved. This information is not intended as a substitute for professional medical care. Always follow your healthcare professional's instructions.                Follow-ups after your visit        Who to contact     If you have questions or need follow up information about today's clinic visit or your schedule please contact [facility] directly at [phone].  Normal or non-critical lab and imaging results will be communicated to you by Edgeiohart, letter or phone within 4 business days after the clinic has received the results. If you do not hear from us within 7 days, please contact the clinic through Netzoptikert or phone. If you have a critical or abnormal lab result, we will notify you by phone as soon as possible.  Submit refill requests through [employer] or call your pharmacy and they will forward the refill request to us. Please allow 3 business days for your refill to be completed.          Additional Information About Your Visit        EdgeioharRealtime Worlds Information     [employer] gives you secure access to your electronic health record. If you see a primary care provider, you can also send messages to your care team and make appointments. If you have questions, please call your primary care clinic.  If you do not have a primary care provider, please call [phone] and they will assist you.        Care EveryWhere ID     This is your Care EveryWhere ID. This could be used by other organizations to access your [place] medical records  [id number]        Your Vitals Were     Pulse Temperature Pulse Oximetry             100 98.6  F (37  C) (Tympanic) 98%          Blood Pressure from Last 3 Encounters:   07/30/18 102/62   05/22/18 112/72   04/02/18 118/74    Weight from Last 3 Encounters:   04/02/18 164 lb (74.4 kg)   01/02/18 172 lb 8 oz (78.2 kg)   12/13/17 171 lb 3.2 oz (77.7 kg)              We  Performed the Following     Beta HCG qual IFA urine     CBC with platelets and differential     UA with Microscopic reflex to Culture     Urine Culture Aerobic Bacterial          Today's Medication Changes          These changes are accurate as of 7/30/18  2:31 PM.  If you have any questions, ask your nurse or doctor.               Start taking these medicines.        Dose/Directions    ondansetron 8 MG ODT tab   Commonly known as:  ZOFRAN ODT   Used for:  Nausea and vomiting, intractability of vomiting not specified, unspecified vomiting type        Dose:  8 mg   Take 1 tablet (8 mg) by mouth every 6 hours as needed for nausea   Quantity:  20 tablet   Refills:  0         Stop taking these medicines if you haven't already. Please contact your care team if you have questions.     meclizine 25 MG tablet   Commonly known as:  ANTIVERT                Where to get your medicines      These medications were sent to [facility] [id number] [place] [address] [address] AT [facility] [address]  [address] [place] [address] [address], [facility] [address]     Phone:  [phone]     ondansetron 8 MG ODT tab                Primary Care Provider Office Phone # Fax #    Kourtney Junior [phone]       [address] [facility] [address]        Equal Access to Services     LIZET VALLES AH: Hadii azra ku hadasho Soomaali, waaxda luqadaha, qaybta kaalmada adeegyada, waxay diandrain hayronitn juan manuel nolasco. So [facility] [phone].    ATENCIÓN: Si habla español, tiene a sheets disposición servicios gratuitos de asistencia lingüística. Llame al [phone].    We comply with applicable federal civil rights laws and [place] laws. We do not discriminate on the basis of race, color, national origin, age, disability, sex, sexual orientation, or gender identity.            Thank you!     Thank you for choosing [facility]  for your care. Our goal is always to provide you with excellent care.  Hearing back from our patients is one way we can continue to improve our services. Please take a few minutes to complete the written survey that you may receive in the mail after your visit with us. Thank you!             Your Updated Medication List - Protect others around you: Learn how to safely use, store and throw away your medicines at www.disposemymeds.org.          This list is accurate as of 7/30/18  2:31 PM.  Always use your most recent med list.                   Brand Name Dispense Instructions for use Diagnosis    cholecalciferol 1000 UNIT tablet    vitamin D3     Take 1,000 Units by mouth        cyanocobalamin 1000 MCG tablet    vitamin  B-12    30 tablet    Take 1 tablet (1,000 mcg) by mouth daily        fish oil-omega-3 fatty acids 1000 MG capsule           ibuprofen 200 MG capsule     120 capsule    Take 200 mg by mouth every 4 hours as needed for fever        MULTIVITAL PO           ondansetron 8 MG ODT tab    ZOFRAN ODT    20 tablet    Take 1 tablet (8 mg) by mouth every 6 hours as needed for nausea    Nausea and vomiting, intractability of vomiting not specified, unspecified vomiting type       TYLENOL 500 MG tablet   Generic drug:  acetaminophen      Take 1-2 tablets (500-1,000 mg) by mouth every 6 hours as needed for mild pain

## 2018-09-22 ENCOUNTER — APPOINTMENT (OUTPATIENT)
Dept: LAB | Facility: CLINIC | Age: 28
End: 2018-09-22
Payer: COMMERCIAL

## 2018-09-22 DIAGNOSIS — N91.2 AMENORRHEA: Primary | ICD-10-CM

## 2018-09-22 LAB — BETA HCG QUAL IFA URINE: NEGATIVE

## 2018-09-22 PROCEDURE — 84703 CHORIONIC GONADOTROPIN ASSAY: CPT | Performed by: FAMILY MEDICINE

## 2018-10-20 DIAGNOSIS — M25.50 ARTHRALGIA, UNSPECIFIED JOINT: ICD-10-CM

## 2018-10-20 DIAGNOSIS — N91.2 AMENORRHEA: Primary | ICD-10-CM

## 2018-10-20 LAB — HCG SERPL QL: NEGATIVE

## 2018-10-20 PROCEDURE — 84703 CHORIONIC GONADOTROPIN ASSAY: CPT | Performed by: FAMILY MEDICINE

## 2018-11-27 ENCOUNTER — OFFICE VISIT (OUTPATIENT)
Dept: URGENT CARE | Facility: URGENT CARE | Age: 28
End: 2018-11-27
Payer: COMMERCIAL

## 2018-11-27 VITALS — DIASTOLIC BLOOD PRESSURE: 80 MMHG | HEART RATE: 73 BPM | OXYGEN SATURATION: 99 % | SYSTOLIC BLOOD PRESSURE: 122 MMHG

## 2018-11-27 DIAGNOSIS — Z32.01 PREGNANCY TEST POSITIVE: Primary | ICD-10-CM

## 2018-11-27 LAB — BETA HCG QUAL IFA URINE: POSITIVE

## 2018-11-27 PROCEDURE — 99213 OFFICE O/P EST LOW 20 MIN: CPT | Performed by: PHYSICIAN ASSISTANT

## 2018-11-27 PROCEDURE — 84703 CHORIONIC GONADOTROPIN ASSAY: CPT | Performed by: PHYSICIAN ASSISTANT

## 2018-11-27 NOTE — MR AVS SNAPSHOT
After Visit Summary   11/27/2018    Agustin Ortega    MRN: [id number]           Patient Information     Date Of Birth          1990        Visit Information        Provider Department      11/27/2018 4:50 PM Wyatt Vaca PA-C [facility]        Today's Diagnoses     Pregnancy test positive    -  1      Care Instructions      HCG (Urine)  Does this test have other names?  Human chorionic gonadotropin urine test, urine pregnancy test  What is this test?  This test measures the amount of human chorionic gonadotropin (HCG) in your urine.  When you get pregnant, your body makes HCG. In a healthy pregnancy, the amount of HCG in the blood increases a lot over a short time, usually doubling every two days. HCG can be found in your urine 2 to 3 days after it can be measured in your blood. This is often about the same time that you notice a missed period.  Except for very early in a pregnancy, this test can show if you are pregnant within days of a missed period.  Why do I need this test?  You may need this test to find out whether you are pregnant. This test is much like home pregnancy tests, which also measure the amount of HCG in your urine.  You may need this test to help plan your pregnancy care or as part of an exam to figure out the cause of symptoms you've been having.  What other tests might I have along with this test?  Your healthcare provider may also order other tests, including:    HCG blood tests    Ultrasound    Blood tests to measure estradiol and progesterone levels  What do my test results mean?  Test results may vary depending on your age, gender, health history, the method used for the test, and other things. Your test results may not mean you have a problem. Ask your healthcare provider what your test results mean for you.   Results are given in mary-international units per milliliter (mIU/mL). A positive result is usually more than 20 mIU/mL of HCG,  meaning that you are pregnant.  Results of less than 5 mIU/mL of HCG are negative, meaning that it's unlikely you are pregnant.  If the results are unclear, or if your healthcare provider has reason to believe you're pregnant, you may need to repeat this test every two days until you have a clear result.  How is this test done?  The test needs a urine sample. Your healthcare provider will tell you how to collect it.  Does this test pose any risks?  This test poses no known risks.  What might affect my test results?  Medicines that contain HCG may affect your results.  How do I get ready for this test?  You don't need to prepare for this test. Be sure your healthcare provider knows about all medicines, herbs, vitamins, and supplements you are taking. This includes medicines that don't need a prescription and any illicit drugs you may use.    6693-3000 [employer]. [address] [place] [address]. All rights reserved. This information is not intended as a substitute for professional medical care. Always follow your healthcare professional's instructions.                Follow-ups after your visit        Who to contact     If you have questions or need follow up information about today's clinic visit or your schedule please contact [facility] directly at [phone].  Normal or non-critical lab and imaging results will be communicated to you by MyChart, letter or phone within 4 business days after the clinic has received the results. If you do not hear from us within 7 days, please contact the clinic through Cobrainhart or phone. If you have a critical or abnormal lab result, we will notify you by phone as soon as possible.  Submit refill requests through [employer]EL[employer] or call your pharmacy and they will forward the refill request to us. Please allow 3 business days for your refill to be completed.          Additional Information About Your Visit        MyCharBroccol-e-games Information     [employer]  gives you secure access to your electronic health record. If you see a primary care provider, you can also send messages to your care team and make appointments. If you have questions, please call your primary care clinic.  If you do not have a primary care provider, please call [phone] and they will assist you.        Care EveryWhere ID     This is your Care EveryWhere ID. This could be used by other organizations to access your [facility] medical records  [id number]        Your Vitals Were     Pulse Last Period Pulse Oximetry Breastfeeding?          73 (LMP Unknown) 99% No         Blood Pressure from Last 3 Encounters:   11/27/18 122/80   07/30/18 102/62   05/22/18 112/72    Weight from Last 3 Encounters:   04/02/18 164 lb (74.4 kg)   01/02/18 172 lb 8 oz (78.2 kg)   12/13/17 171 lb 3.2 oz (77.7 kg)              We Performed the Following     Beta HCG qual IFA urine        Primary Care Provider Office Phone # Fax #    Kourtney Marivel Junior [phone]       [address] [facility] [address]        Equal Access to Services     [facility]: Hadii aad ku hadasho Soomaali, waaxda luqadaha, qaybta kaalmada adeegyada, solomon sherwood hayronitn adeyolanda nunez . So [facility] [phone].    ATENCIÓN: Si habla español, tiene a sheets disposición servicios gratuitos de asistencia lingüística. [facility] [phone].    We comply with applicable federal civil rights laws and [place] laws. We do not discriminate on the basis of race, color, national origin, age, disability, sex, sexual orientation, or gender identity.            Thank you!     Thank you for choosing [facility]  for your care. Our goal is always to provide you with excellent care. Hearing back from our patients is one way we can continue to improve our services. Please take a few minutes to complete the written survey that you may receive in the mail after your visit with us. Thank you!             Your Updated  Medication List - Protect others around you: Learn how to safely use, store and throw away your medicines at www.disposemymeds.org.          This list is accurate as of 11/27/18  6:09 PM.  Always use your most recent med list.                   Brand Name Dispense Instructions for use Diagnosis    fish oil-omega-3 fatty acids 1000 MG capsule           ibuprofen 200 MG capsule    ADVIL/MOTRIN    120 capsule    Take 200 mg by mouth every 4 hours as needed for fever        MULTIVITAL PO           ondansetron 8 MG ODT tab    ZOFRAN ODT    20 tablet    Take 1 tablet (8 mg) by mouth every 6 hours as needed for nausea    Nausea and vomiting, intractability of vomiting not specified, unspecified vomiting type       TYLENOL 500 MG tablet   Generic drug:  acetaminophen      Take 1-2 tablets (500-1,000 mg) by mouth every 6 hours as needed for mild pain        vitamin B-12 1000 MCG tablet    CYANOCOBALAMIN    30 tablet    Take 1 tablet (1,000 mcg) by mouth daily        vitamin D3 1000 units (25 mcg) tablet    CHOLECALCIFEROL     Take 1,000 Units by mouth

## 2018-11-27 NOTE — PROGRESS NOTES
SUBJECTIVE:   Agustin Ortega is a 28 year old female who  presents today for a possible pregnancy. Periods are typically irregular.  LMP was 6-8 weeks ago.  Took UPT last night which was positive.  Would like confirmation.     No past medical history on file.  Current Outpatient Prescriptions   Medication Sig Dispense Refill     cholecalciferol (VITAMIN D3) 1000 UNIT tablet Take 1,000 Units by mouth       cyanocobalamin (VITAMIN  B-12) 1000 MCG tablet Take 1 tablet (1,000 mcg) by mouth daily 30 tablet 0     fish oil-omega-3 fatty acids 1000 MG capsule        Multiple Vitamins-Minerals (MULTIVITAL PO)        acetaminophen (TYLENOL) 500 MG tablet Take 1-2 tablets (500-1,000 mg) by mouth every 6 hours as needed for mild pain       ibuprofen 200 MG capsule Take 200 mg by mouth every 4 hours as needed for fever 120 capsule      ondansetron (ZOFRAN-ODT) 8 MG ODT tab Take 1 tablet (8 mg) by mouth every 6 hours as needed for nausea (Patient not taking: Reported on 11/27/2018) 20 tablet 0     Social History   Substance Use Topics     Smoking status: Never Smoker     Smokeless tobacco: Never Used     Alcohol use No       ROS:   Review of systems negative except as stated above.    OBJECTIVE:  /80  Pulse 73  LMP  (LMP Unknown)  SpO2 99%  Breastfeeding? No  GENERAL APPEARANCE: healthy, alert and no distress  RESP: lungs clear to auscultation - no rales, rhonchi or wheezes  CV: regular rates and rhythm, normal S1 S2, no murmur noted    Results for orders placed or performed in visit on 11/27/18   Beta HCG qual IFA urine   Result Value Ref Range    Beta HCG Qual IFA Urine Positive (A) NEG^Negative            ASSESSMENT:   (Z32.01) Pregnancy test positive  (primary encounter diagnosis)  Plan: Beta HCG qual IFA urine, CANCELED: HCG         quantitative pregnancy  Follow up with OBGYN/PCP      Patient Instructions     HCG (Urine)  Does this test have other names?  Human chorionic gonadotropin urine test, urine  pregnancy test  What is this test?  This test measures the amount of human chorionic gonadotropin (HCG) in your urine.  When you get pregnant, your body makes HCG. In a healthy pregnancy, the amount of HCG in the blood increases a lot over a short time, usually doubling every two days. HCG can be found in your urine 2 to 3 days after it can be measured in your blood. This is often about the same time that you notice a missed period.  Except for very early in a pregnancy, this test can show if you are pregnant within days of a missed period.  Why do I need this test?  You may need this test to find out whether you are pregnant. This test is much like home pregnancy tests, which also measure the amount of HCG in your urine.  You may need this test to help plan your pregnancy care or as part of an exam to figure out the cause of symptoms you've been having.  What other tests might I have along with this test?  Your healthcare provider may also order other tests, including:    HCG blood tests    Ultrasound    Blood tests to measure estradiol and progesterone levels  What do my test results mean?  Test results may vary depending on your age, gender, health history, the method used for the test, and other things. Your test results may not mean you have a problem. Ask your healthcare provider what your test results mean for you.   Results are given in mary-international units per milliliter (mIU/mL). A positive result is usually more than 20 mIU/mL of HCG, meaning that you are pregnant.  Results of less than 5 mIU/mL of HCG are negative, meaning that it's unlikely you are pregnant.  If the results are unclear, or if your healthcare provider has reason to believe you're pregnant, you may need to repeat this test every two days until you have a clear result.  How is this test done?  The test needs a urine sample. Your healthcare provider will tell you how to collect it.  Does this test pose any risks?  This test poses no  known risks.  What might affect my test results?  Medicines that contain HCG may affect your results.  How do I get ready for this test?  You don't need to prepare for this test. Be sure your healthcare provider knows about all medicines, herbs, vitamins, and supplements you are taking. This includes medicines that don't need a prescription and any illicit drugs you may use.    4574-0588 [employer]. [address] [place] [address], [place] [address]. All rights reserved. This information is not intended as a substitute for professional medical care. Always follow your healthcare professional's instructions.

## 2018-11-28 NOTE — PATIENT INSTRUCTIONS
HCG (Urine)  Does this test have other names?  Human chorionic gonadotropin urine test, urine pregnancy test  What is this test?  This test measures the amount of human chorionic gonadotropin (HCG) in your urine.  When you get pregnant, your body makes HCG. In a healthy pregnancy, the amount of HCG in the blood increases a lot over a short time, usually doubling every two days. HCG can be found in your urine 2 to 3 days after it can be measured in your blood. This is often about the same time that you notice a missed period.  Except for very early in a pregnancy, this test can show if you are pregnant within days of a missed period.  Why do I need this test?  You may need this test to find out whether you are pregnant. This test is much like home pregnancy tests, which also measure the amount of HCG in your urine.  You may need this test to help plan your pregnancy care or as part of an exam to figure out the cause of symptoms you've been having.  What other tests might I have along with this test?  Your healthcare provider may also order other tests, including:    HCG blood tests    Ultrasound    Blood tests to measure estradiol and progesterone levels  What do my test results mean?  Test results may vary depending on your age, gender, health history, the method used for the test, and other things. Your test results may not mean you have a problem. Ask your healthcare provider what your test results mean for you.   Results are given in mary-international units per milliliter (mIU/mL). A positive result is usually more than 20 mIU/mL of HCG, meaning that you are pregnant.  Results of less than 5 mIU/mL of HCG are negative, meaning that it's unlikely you are pregnant.  If the results are unclear, or if your healthcare provider has reason to believe you're pregnant, you may need to repeat this test every two days until you have a clear result.  How is this test done?  The test needs a urine sample. Your healthcare  provider will tell you how to collect it.  Does this test pose any risks?  This test poses no known risks.  What might affect my test results?  Medicines that contain HCG may affect your results.  How do I get ready for this test?  You don't need to prepare for this test. Be sure your healthcare provider knows about all medicines, herbs, vitamins, and supplements you are taking. This includes medicines that don't need a prescription and any illicit drugs you may use.    9830-7161 [employer] [address] [place] [address], [place] [address]. All rights reserved. This information is not intended as a substitute for professional medical care. Always follow your healthcare professional's instructions.

## 2018-12-03 DIAGNOSIS — Z34.90 SUPERVISION OF NORMAL PREGNANCY: Primary | ICD-10-CM

## 2018-12-13 ENCOUNTER — PRENATAL OFFICE VISIT (OUTPATIENT)
Dept: NURSING | Facility: CLINIC | Age: 28
End: 2018-12-13
Payer: COMMERCIAL

## 2018-12-13 DIAGNOSIS — Z34.90 SUPERVISION OF NORMAL PREGNANCY: Primary | ICD-10-CM

## 2018-12-13 PROCEDURE — 87086 URINE CULTURE/COLONY COUNT: CPT | Performed by: OBSTETRICS & GYNECOLOGY

## 2018-12-13 PROCEDURE — 99207 ZZC NO CHARGE NURSE ONLY: CPT

## 2018-12-13 NOTE — NURSING NOTE
"NPN nurse visit. 1st dr visit scheduled for 1/9/19 with Chance Wasserman M.D.  Pap not due. Last pap 4/2018.  LMP is noted as \"Unknown\". Pt is scheduled for an  1/8/19.    SHAILESH Singleton RN    "

## 2018-12-14 LAB
BACTERIA SPEC CULT: NORMAL
SPECIMEN SOURCE: NORMAL

## 2018-12-21 ENCOUNTER — TELEPHONE (OUTPATIENT)
Dept: OBGYN | Facility: CLINIC | Age: 28
End: 2018-12-21

## 2018-12-21 ENCOUNTER — OFFICE VISIT (OUTPATIENT)
Dept: OBGYN | Facility: CLINIC | Age: 28
End: 2018-12-21
Payer: COMMERCIAL

## 2018-12-21 VITALS — BODY MASS INDEX: 30.99 KG/M2 | DIASTOLIC BLOOD PRESSURE: 76 MMHG | SYSTOLIC BLOOD PRESSURE: 122 MMHG | WEIGHT: 164 LBS

## 2018-12-21 DIAGNOSIS — O21.0 HYPEREMESIS AFFECTING PREGNANCY, ANTEPARTUM: Primary | ICD-10-CM

## 2018-12-21 PROCEDURE — 99203 OFFICE O/P NEW LOW 30 MIN: CPT | Performed by: ADVANCED PRACTICE MIDWIFE

## 2018-12-21 RX ORDER — METOCLOPRAMIDE 10 MG/1
10 TABLET ORAL 4 TIMES DAILY PRN
Qty: 33 TABLET | Refills: 3 | Status: SHIPPED | OUTPATIENT
Start: 2018-12-21 | End: 2019-03-04 | Stop reason: ALTCHOICE

## 2018-12-21 NOTE — TELEPHONE ENCOUNTER
Pt calling this morning stating she has been struggling with hyperemesis this pregnancy. She is unsure of LMP or how far along she is. She states she has already thrown up 6 times this morning. Pt has tried vitamin B6 and martin to help her nausea without relief. She would like to see someone today to try to get some relief before the weekend. Dr Wasserman is off today (who she is scheduled to see for her NPN). Put her on Kathya's schedule. Made it clear to pt that this would NOT take the place of her NPN MD visit and she would still need to come to that appointment. She is agreeable.        Nathaly Fierro RN

## 2018-12-21 NOTE — PROGRESS NOTES
S: Zuleima is a newly pregnant patient who has not been seen for NPN or NOB visit yet, but reports severe nausea and vomiting in early pregnancy. Patient has irregular periods and her LMP is best guessed to be late September/early October. She had negative pregnancy tests on 9/22 and 10/20 (see lab visits). 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 She states the nausea started around the time she took a home UPT and began vomiting intermittently in December. She reports the n/v has increased and gotten much worse in the past two weeks. States she vomited 6x this morning and has been unable to keep any food or water down today. Prior to this week, she has been able to tolerate some She has tried vitamin B6 and dietary modifications, but they are not working. She is also seeking reassurance that this is not negatively affecting her pregnancy. Denies dizziness or concerns for decreases urine output.    O: /76 (BP Location: Right arm, Patient Position: Sitting, Cuff Size: Adult Regular)   Wt 74.4 kg (164 lb)   LMP  (LMP Unknown)   BMI 30.99 kg/m     ROS: 10 point ROS neg other than the symptoms noted above in the HPI.    A: Hyperemesis gravidarum     P: Discontinue Vitamin B6 and begin taking Reglan 10mg every 6 hours as needed.  Discussed continued dietary modifications and stressed the importance taking small bites of food and sips of water as frequently as she can.   Reviewed that if Reglan doesn't help, we can switch to Zofran, but if symptoms worsen or she becomes symptomatic of severe dehydration, she should present to the ED.   Discussed possible need for IV infusions if medications do not help.  Keep future appointments for US and NOB visit.   Call with any concerns or worsening symptoms.    Kathya Mcdaniels CNM on 12/21/2018 at 12:11 PM

## 2018-12-21 NOTE — NURSING NOTE
"Chief Complaint   Patient presents with     Hyperemesis     LMP: unknown        Initial /76 (BP Location: Right arm, Patient Position: Sitting, Cuff Size: Adult Regular)   Wt 74.4 kg (164 lb)   LMP  (LMP Unknown)   BMI 30.99 kg/m   Estimated body mass index is 30.99 kg/m  as calculated from the following:    Height as of 18: 1.549 m (5' 1\").    Weight as of this encounter: 74.4 kg (164 lb).  BP completed using cuff size: regular    Questioned patient about current smoking habits.  Pt. has never smoked.          The following HM Due: NONE    Troy Mcdaniels CMA                "

## 2018-12-26 ENCOUNTER — TELEPHONE (OUTPATIENT)
Dept: OBGYN | Facility: CLINIC | Age: 28
End: 2018-12-26

## 2018-12-26 DIAGNOSIS — O21.0 HYPEREMESIS GRAVIDARUM: Primary | ICD-10-CM

## 2018-12-26 RX ORDER — ONDANSETRON 8 MG/1
TABLET, FILM COATED ORAL
Qty: 30 TABLET | Refills: 1 | Status: SHIPPED | OUTPATIENT
Start: 2018-12-26 | End: 2019-03-04 | Stop reason: DRUGHIGH

## 2018-12-26 NOTE — TELEPHONE ENCOUNTER
"Patient calling:  Has been taking reglan and\" not working\"  Keeping some fluids down, is urinating..  Not keeping food down.  She would like a different med.  Pharmacy selected.  Message sent to on call VINCE Higgins RN      "

## 2018-12-27 ENCOUNTER — NURSE TRIAGE (OUTPATIENT)
Dept: NURSING | Facility: CLINIC | Age: 28
End: 2018-12-27

## 2018-12-28 NOTE — TELEPHONE ENCOUNTER
"\"I am pregnant and I keep vomiting.I was given Reglan, it didn't help, so then given Zofran. Every single time I eat I vomit. I've vomiting at least 7 times today. My belly is cramping, I feel hungry. I just tried eating a sandwich, it came back up. I can keep very small sips of water down, I feel weak and no energy.\" Denies other sx. Is urinating normally. Triaged and advised ER.    Reason for Disposition    [1] SEVERE vomiting (e.g., 8 or more times / day) AND [2] present > 8 hours    Additional Information    Negative: [1] Vaginal bleeding AND [2] pregnant < 20 weeks    Negative: [1] Abdominal pain AND [2] pregnant < 20 weeks    Negative: Headache is the main complaint    Negative: [1] Vomiting AND [2] contains red blood or black (\"coffee ground\") material  (Exception: few red streaks in vomit that only happened once)    Negative: [1] Insulin-dependent diabetes (Type I) AND [2] glucose > 400 mg/dl (22 mmol/l)    Negative: Recent head injury (within last 3 days)    Negative: Recent abdominal injury (within last 3 days)    Negative: Severe pain in one eye    Protocols used: PREGNANCY - MORNING SICKNESS (NAUSEA AND VOMITING OF PREGNANCY)-ADULT-      "

## 2019-01-08 DIAGNOSIS — Z34.90 SUPERVISION OF NORMAL PREGNANCY: ICD-10-CM

## 2019-01-08 LAB
ABO + RH BLD: NORMAL
ABO + RH BLD: NORMAL
BLD GP AB SCN SERPL QL: NORMAL
BLOOD BANK CMNT PATIENT-IMP: NORMAL
ERYTHROCYTE [DISTWIDTH] IN BLOOD BY AUTOMATED COUNT: 13 % (ref 10–15)
HBV SURFACE AG SERPL QL IA: NONREACTIVE
HCT VFR BLD AUTO: 35.8 % (ref 35–47)
HGB BLD-MCNC: 12.3 G/DL (ref 11.7–15.7)
HIV 1+2 AB+HIV1 P24 AG SERPL QL IA: NONREACTIVE
MCH RBC QN AUTO: 28.3 PG (ref 26.5–33)
MCHC RBC AUTO-ENTMCNC: 34.4 G/DL (ref 31.5–36.5)
MCV RBC AUTO: 82 FL (ref 78–100)
PLATELET # BLD AUTO: 180 10E9/L (ref 150–450)
RBC # BLD AUTO: 4.35 10E12/L (ref 3.8–5.2)
SPECIMEN EXP DATE BLD: NORMAL
WBC # BLD AUTO: 4.3 10E9/L (ref 4–11)

## 2019-01-08 PROCEDURE — 36415 COLL VENOUS BLD VENIPUNCTURE: CPT | Performed by: OBSTETRICS & GYNECOLOGY

## 2019-01-08 PROCEDURE — 87389 HIV-1 AG W/HIV-1&-2 AB AG IA: CPT | Performed by: OBSTETRICS & GYNECOLOGY

## 2019-01-08 PROCEDURE — 86900 BLOOD TYPING SEROLOGIC ABO: CPT | Performed by: OBSTETRICS & GYNECOLOGY

## 2019-01-08 PROCEDURE — 86762 RUBELLA ANTIBODY: CPT | Performed by: OBSTETRICS & GYNECOLOGY

## 2019-01-08 PROCEDURE — 87340 HEPATITIS B SURFACE AG IA: CPT | Performed by: OBSTETRICS & GYNECOLOGY

## 2019-01-08 PROCEDURE — 86901 BLOOD TYPING SEROLOGIC RH(D): CPT | Performed by: OBSTETRICS & GYNECOLOGY

## 2019-01-08 PROCEDURE — 85027 COMPLETE CBC AUTOMATED: CPT | Performed by: OBSTETRICS & GYNECOLOGY

## 2019-01-08 PROCEDURE — 86780 TREPONEMA PALLIDUM: CPT | Performed by: OBSTETRICS & GYNECOLOGY

## 2019-01-08 PROCEDURE — 86850 RBC ANTIBODY SCREEN: CPT | Performed by: OBSTETRICS & GYNECOLOGY

## 2019-01-09 ENCOUNTER — TELEPHONE (OUTPATIENT)
Dept: OBGYN | Facility: CLINIC | Age: 29
End: 2019-01-09

## 2019-01-09 DIAGNOSIS — O21.9 NAUSEA AND VOMITING DURING PREGNANCY: Primary | ICD-10-CM

## 2019-01-09 LAB
RUBV IGG SERPL IA-ACNC: 102 IU/ML
T PALLIDUM AB SER QL: NONREACTIVE

## 2019-01-09 RX ORDER — PROMETHAZINE HYDROCHLORIDE 25 MG/1
25 SUPPOSITORY RECTAL EVERY 6 HOURS PRN
Qty: 10 SUPPOSITORY | Refills: 1 | Status: SHIPPED | OUTPATIENT
Start: 2019-01-09 | End: 2019-02-01

## 2019-01-09 NOTE — TELEPHONE ENCOUNTER
Attempted to call patient, no answer. Rx for RECTAL Promethazine sent to [facility] in . Patient MAR suggests she has prescription for zofran. If she is not taking, please encourage. Patient should make CNM appointment this week to evaluate for need for IV hydration therapy. Please notify patient. Deisy Palomares, SETH, APRN, CNM

## 2019-01-09 NOTE — TELEPHONE ENCOUNTER
Pt calling. She is 12 weeks pregnant and struggling with n/v. She has tried Reglan, Zofran, B6, and Unisom without relief. She says she has vomited 7 times today. No fever. She would like a new rx sent for nausea if possible to [facility] in [facility]/13. Recommended sips of fluids as tolerated. Also told pt to go to the ED with signs of excessive dehydration including not keeping fluids down for 12 hours, lack of urination, and very dry mouth. She is agreeable.        Nathaly Fierro RN

## 2019-01-10 NOTE — TELEPHONE ENCOUNTER
Spoke with pt.  States Zofran did not help.  Will  new RX.  Appt scheduled for 1/1019.  Lolita Higgins RN

## 2019-01-26 DIAGNOSIS — R30.0 DYSURIA: Primary | ICD-10-CM

## 2019-01-26 LAB
ALBUMIN UR-MCNC: NEGATIVE MG/DL
APPEARANCE UR: CLEAR
BILIRUB UR QL STRIP: NEGATIVE
COLOR UR AUTO: YELLOW
GLUCOSE UR STRIP-MCNC: NEGATIVE MG/DL
HGB UR QL STRIP: NEGATIVE
KETONES UR STRIP-MCNC: 15 MG/DL
LEUKOCYTE ESTERASE UR QL STRIP: NEGATIVE
MUCOUS THREADS #/AREA URNS LPF: PRESENT /LPF
NITRATE UR QL: NEGATIVE
PH UR STRIP: 5.5 PH (ref 5–7)
RBC #/AREA URNS AUTO: ABNORMAL /HPF
SOURCE: ABNORMAL
SP GR UR STRIP: >1.03 (ref 1–1.03)
UROBILINOGEN UR STRIP-ACNC: 0.2 EU/DL (ref 0.2–1)
WBC #/AREA URNS AUTO: ABNORMAL /HPF

## 2019-01-26 PROCEDURE — 81001 URINALYSIS AUTO W/SCOPE: CPT | Performed by: INTERNAL MEDICINE

## 2019-02-01 ENCOUNTER — PRENATAL OFFICE VISIT (OUTPATIENT)
Dept: OBGYN | Facility: CLINIC | Age: 29
End: 2019-02-01
Payer: COMMERCIAL

## 2019-02-01 VITALS — SYSTOLIC BLOOD PRESSURE: 122 MMHG | BODY MASS INDEX: 30.42 KG/M2 | WEIGHT: 161 LBS | DIASTOLIC BLOOD PRESSURE: 64 MMHG

## 2019-02-01 DIAGNOSIS — Z34.02 ENCOUNTER FOR SUPERVISION OF NORMAL FIRST PREGNANCY IN SECOND TRIMESTER: Primary | ICD-10-CM

## 2019-02-01 PROCEDURE — 99207 ZZC FIRST OB VISIT: CPT | Performed by: ADVANCED PRACTICE MIDWIFE

## 2019-02-01 RX ORDER — ONDANSETRON 4 MG/1
4 TABLET, ORALLY DISINTEGRATING ORAL EVERY 8 HOURS PRN
Qty: 30 TABLET | Refills: 1 | Status: SHIPPED | OUTPATIENT
Start: 2019-02-01 | End: 2019-04-01

## 2019-02-01 NOTE — NURSING NOTE
"Chief Complaint   Patient presents with     Prenatal Care       Initial /64 (BP Location: Left arm, Patient Position: Sitting, Cuff Size: Adult Regular)   Wt 73 kg (161 lb)   LMP 10/17/2018 (Exact Date)   BMI 30.42 kg/m   Estimated body mass index is 30.42 kg/m  as calculated from the following:    Height as of 18: 1.549 m (5' 1\").    Weight as of this encounter: 73 kg (161 lb).  BP completed using cuff size: regular    Questioned patient about current smoking habits.  Pt. has never smoked.          The following  Due: NONE    15w2d  Troy Mcdaniels CMA                "

## 2019-02-01 NOTE — PROGRESS NOTES
Agustin Ortega is a 28 year old  ,  who is not a previous CNM patient. She presents for a new OB Visit. This was a planned pregnancy.     FOB is Timi who is in good health.  FORENÉE IS actively involved in relationship and this pregnancy.       She has not had bleeding since her LMP.    She denies abdominal pain since her LMP.  She has had nausea.  has had vomiting. Tried multiple medications and remedies, finally starting to feel some relief in the past few weeks. Able to tolerate larger amounts of fluid now, drinking about 2L of water per day.  She has noticed her urine has a stronger odor than normal, denies other UTI symptoms. Recent UA unremarkable.  Any personal or family history of blood clots? Yes - father had a stroke in his late 50s.  History of sickle cell anemia or trait? No         Patient's last menstrual period was 10/17/2018 (exact date)..  Estimated Date of Delivery: 2019 Ultrasound consistent with LMP.    MENSTRUAL HISTORY    irregular q1.5-2 months  Last PAP:  2018  History of abnormal Pap?  No    Health maintenance updated:  yes        Current medications are:    Current Outpatient Medications:      Prenatal MV-Min-Fe Fum-FA-DHA (PRENATAL 1 PO), , Disp: , Rfl:      ondansetron (ZOFRAN) 8 MG tablet, Take 1/2 to 1 tab every 8 hrs as needed, Disp: 30 tablet, Rfl: 1     INFECTION HISTORY  HIV: No  Hepatitis B: No  Hepatitis C: No  Tuberculosis: No  Last PPD   Genital Herpes self: no  Herpes partner:  no  Chlamydia:  no  Gonorrhea:  no  HPV: No  BV:  No  Syphilis:  No  Chicken Pox:  Yes - immunization      OB HISTORY  Obstetric History       T0      L0     SAB0   TAB0   Ectopic0   Multiple0   Live Births0       # Outcome Date GA Lbr Tip/2nd Weight Sex Delivery Anes PTL Lv   1 Current                   History of GDM: No,  PTL : No,  History of HTN in pregnancy: No,  Thrombocytopenia: No,  Shoulder dystocia: No,  Vacuum Extraction: No  PPH: No   3rd of  4th degree laceration: No.   Other complications: No    PERSONAL HISTORY  Exercise Habits:  none irregularly days per week.  Employment: Full time.  Her job involves light activity with little potential for toxic exposure.    Travel plans:  are none planned.   Diet: eats regular meals  Abuse concerns? No  Hgb A1c screen:  BMI > 30: No, 1st degree family DM: Yes, History of GDM: No, PCOS: No, High risk ethnicity: No    Social History     Socioeconomic History     Marital status:      Spouse name: Timi     Number of children: Not on file     Years of education: Not on file     Highest education level: Not on file   Social Needs     Financial resource strain: Not on file     Food insecurity - worry: Not on file     Food insecurity - inability: Not on file     Transportation needs - medical: Not on file     Transportation needs - non-medical: Not on file   Occupational History     Occupation: [employer]   Tobacco Use     Smoking status: Never Smoker     Smokeless tobacco: Never Used   Substance and Sexual Activity     Alcohol use: No     Drug use: No     Sexual activity: Yes     Partners: Male   Other Topics Concern     Parent/sibling w/ CABG, MI or angioplasty before 65F 55M? No   Social History Narrative     Not on file         She  reports that  has never smoked. she has never used smokeless tobacco.    STD testing offered?  Declined  Last PHQ-9 score on record = No flowsheet data found.  Last GAD7 score on record = No flowsheet data found.  Alcohol Score = 0  Referral/Meds needed? no    PAST MEDICAL/SURGICAL HISTORY  History reviewed. No pertinent past medical history.  Past Surgical History:   Procedure Laterality Date     ENT SURGERY         FAMILY HISTORY  Family History   Problem Relation Age of Onset     Diabetes Father      Hypertension Father      Hyperlipidemia Father      Cerebrovascular Disease Father 60     Cancer Maternal Grandmother      Cancer Maternal Grandfather      Diabetes Brother 31        Type  2         ROS:  CONSTITUTIONAL: NEGATIVE for fever, chills, change in weight  INTEGUMENTARU/SKIN: NEGATIVE for worrisome rashes, moles or lesions  EYES: NEGATIVE for vision changes or irritation  ENT: NEGATIVE for ear, mouth and throat problems  RESP: NEGATIVE for significant cough or SOB  BREAST: NEGATIVE for masses, tenderness or discharge  CV: NEGATIVE for chest pain, palpitations or peripheral edema  GI: NEGATIVE for nausea, abdominal pain, heartburn, or change in bowel habits  : NEGATIVE for unusual urinary or vaginal symptoms.  MUSCULOSKELETAL: NEGATIVE for significant arthralgias or myalgia  NEURO: NEGATIVE for weakness, dizziness or paresthesias  PSYCHIATRIC: NEGATIVE for changes in mood or affect    PHYSICAL EXAM  Vitals: /64 (BP Location: Left arm, Patient Position: Sitting, Cuff Size: Adult Regular)   Wt 73 kg (161 lb)   LMP 10/17/2018 (Exact Date)   BMI 30.42 kg/m    BMI= Body mass index is 30.42 kg/m .     GENERAL:  28 year old pleasant pregnant female, alert, cooperative and well groomed.  NECK:  Thyroid without enlargement and nodules.  Lymph nodes not palpable.   LUNGS:  Clear to auscultation.  BREAST:  Symmetrical without lesions or nodes.  Nipples everted.  Areolas symmetric.  No palpable axillary nodes.  HEART:  RRR without murmur.  ABDOMEN: Soft without masses or tenderness.  No scars noted..  GENITALIA: BUS without tenderness or inflammation.  Perineum without lesions.    PELVIC:  declined  LOWER EXTREMITIES: No edema. No significant varicosities.    ASSESSMENT/PLAN:    IUP at 15w2d  Encounter Diagnosis   Name Primary?     Encounter for supervision of normal first pregnancy in second trimester Yes        consult for US for AMA patients: No   Genetic Testing reviewed and discussed, patient desires none.     COUNSELING    Instructed on use of triage nurse line and contacting the on call CNM after hours in an emergency.     Symptoms of N&V and fatigue usually start to resolve  around 12-16 weeks     Reviewed CNM philosophy, call schedule for labor and delivery, and [facility] for delivery    1st OB handout given outlining appointment spacing and CNM information    Reviewed exercise and nutrition    Recommend to gain 25-35 pounds with her pregnancy.    Discussed OTC medications. OB med list given    Encouraged patient to arrange  if needed    Encouraged patient to take PNV's/DHA    Travel precautions discussed, no air travel after 36 weeks and Zika Virus discussed    Will call patient with lab results when available    Does patient desire a RN home visit from the [facility]?  No       F/U to be addressed next visit:  Rx's given, referrals    Will return to the clinic in 4 weeks for her next routine prenatal check.  Will call to be seen sooner if problems arise.  Order for 20 week US placed.      Kathya Mcdaniels CNM on 2/1/2019 at 5:06 PM

## 2019-02-21 ENCOUNTER — TELEPHONE (OUTPATIENT)
Dept: OBGYN | Facility: CLINIC | Age: 29
End: 2019-02-21

## 2019-02-21 NOTE — TELEPHONE ENCOUNTER
Patient calling:  Having left sided abdominal discomfort, intermittent for several days.  No bleeding,  Had a sharp pain when sneezed x 1, denies difficulty urinating.  No fever  Has been also constipated. Did have a BM yesterday and today.    Patient advised to hydrate, take a stool softener,  Can apply heat to left side of abdomen, warm bath, tylenol for discomfort.  If pain persists advised to call back.  Lolita Higgins RN

## 2019-03-04 ENCOUNTER — ANCILLARY PROCEDURE (OUTPATIENT)
Dept: ULTRASOUND IMAGING | Facility: CLINIC | Age: 29
End: 2019-03-04
Payer: COMMERCIAL

## 2019-03-04 ENCOUNTER — PRENATAL OFFICE VISIT (OUTPATIENT)
Dept: OBGYN | Facility: CLINIC | Age: 29
End: 2019-03-04
Payer: COMMERCIAL

## 2019-03-04 VITALS — DIASTOLIC BLOOD PRESSURE: 66 MMHG | BODY MASS INDEX: 30.36 KG/M2 | WEIGHT: 160.7 LBS | SYSTOLIC BLOOD PRESSURE: 106 MMHG

## 2019-03-04 DIAGNOSIS — Z34.02 ENCOUNTER FOR SUPERVISION OF NORMAL FIRST PREGNANCY IN SECOND TRIMESTER: ICD-10-CM

## 2019-03-04 DIAGNOSIS — Z34.02 ENCOUNTER FOR SUPERVISION OF NORMAL FIRST PREGNANCY IN SECOND TRIMESTER: Primary | ICD-10-CM

## 2019-03-04 PROCEDURE — 76805 OB US >/= 14 WKS SNGL FETUS: CPT | Performed by: OBSTETRICS & GYNECOLOGY

## 2019-03-04 PROCEDURE — 99207 ZZC PRENATAL VISIT: CPT | Performed by: ADVANCED PRACTICE MIDWIFE

## 2019-03-04 RX ORDER — ONDANSETRON 4 MG/1
4 TABLET, ORALLY DISINTEGRATING ORAL EVERY 8 HOURS PRN
Qty: 90 TABLET | Refills: 3 | Status: ON HOLD | OUTPATIENT
Start: 2019-03-04 | End: 2019-07-30

## 2019-03-04 NOTE — PROGRESS NOTES
S: Feels good. Disintegrating zofran tablets are helping much more than other meds. Reports only occasional nausea at this time. Requesting refill.  Has not started feeling fetal movement.  Denies uterine cramping, vaginal bleeding or leaking of fluid. Just had US and sex revealed, BOY!   O: Vitals: /66   Wt 72.9 kg (160 lb 11.2 oz)   LMP 10/17/2018 (Exact Date)   BMI 30.36 kg/m    BMI= Body mass index is 30.36 kg/m .  Exam:  Constitutional: healthy, alert and no distress  Respiratory: respirations even and unlabored  Gastrointestinal: Abdomen soft, non-tender. Fundus measures appropriate for gestational age. Fetal heart tones hear without difficulty and within normal limits  : Deferred  Psychiatric: mentation appears normal and affect normal/bright  A:    Diagnosis Comments   1. Encounter for supervision of normal first pregnancy in second trimester  ondansetron (ZOFRAN-ODT) 4 MG ODT tab      P: Discussed 20 week fetal screen and that I will release normal results to [facility] or call if anything needs follow-up.  Discussed typical timeframe of when fetal movement occurs.   Informed patient and spoouse of upcoming meet the midwives event.  Zofran prescription refilled.  Encouraged patient to call with any questions or concerns.      Kathya Mcdaniels CNM on 3/4/2019 at 4:28 PM

## 2019-03-05 DIAGNOSIS — Z34.02 ENCOUNTER FOR SUPERVISION OF NORMAL FIRST PREGNANCY IN SECOND TRIMESTER: Primary | ICD-10-CM

## 2019-03-18 DIAGNOSIS — Z34.02 ENCOUNTER FOR SUPERVISION OF NORMAL FIRST PREGNANCY IN SECOND TRIMESTER: ICD-10-CM

## 2019-03-20 ENCOUNTER — TRANSCRIBE ORDERS (OUTPATIENT)
Dept: MATERNAL FETAL MEDICINE | Facility: CLINIC | Age: 29
End: 2019-03-20

## 2019-03-20 DIAGNOSIS — O26.90 PREGNANCY RELATED CONDITION, ANTEPARTUM: Primary | ICD-10-CM

## 2019-03-26 ENCOUNTER — PRE VISIT (OUTPATIENT)
Dept: MATERNAL FETAL MEDICINE | Facility: CLINIC | Age: 29
End: 2019-03-26

## 2019-04-01 ENCOUNTER — PRENATAL OFFICE VISIT (OUTPATIENT)
Dept: OBGYN | Facility: CLINIC | Age: 29
End: 2019-04-01
Payer: COMMERCIAL

## 2019-04-01 ENCOUNTER — ANCILLARY PROCEDURE (OUTPATIENT)
Dept: GENERAL RADIOLOGY | Facility: CLINIC | Age: 29
End: 2019-04-01
Attending: FAMILY MEDICINE
Payer: COMMERCIAL

## 2019-04-01 ENCOUNTER — OFFICE VISIT (OUTPATIENT)
Dept: URGENT CARE | Facility: URGENT CARE | Age: 29
End: 2019-04-01
Payer: COMMERCIAL

## 2019-04-01 VITALS — DIASTOLIC BLOOD PRESSURE: 74 MMHG | TEMPERATURE: 98.5 F | SYSTOLIC BLOOD PRESSURE: 108 MMHG | HEART RATE: 89 BPM

## 2019-04-01 VITALS
WEIGHT: 160.2 LBS | BODY MASS INDEX: 30.25 KG/M2 | SYSTOLIC BLOOD PRESSURE: 116 MMHG | HEIGHT: 61 IN | DIASTOLIC BLOOD PRESSURE: 60 MMHG

## 2019-04-01 DIAGNOSIS — S99.912A ANKLE INJURY, LEFT, INITIAL ENCOUNTER: Primary | ICD-10-CM

## 2019-04-01 DIAGNOSIS — Z34.02 ENCOUNTER FOR SUPERVISION OF NORMAL FIRST PREGNANCY IN SECOND TRIMESTER: Primary | ICD-10-CM

## 2019-04-01 PROCEDURE — 73610 X-RAY EXAM OF ANKLE: CPT | Mod: LT

## 2019-04-01 PROCEDURE — 99213 OFFICE O/P EST LOW 20 MIN: CPT | Performed by: FAMILY MEDICINE

## 2019-04-01 PROCEDURE — 99207 ZZC PRENATAL VISIT: CPT | Performed by: ADVANCED PRACTICE MIDWIFE

## 2019-04-01 ASSESSMENT — MIFFLIN-ST. JEOR: SCORE: 1389.04

## 2019-04-01 NOTE — PROGRESS NOTES
"S: Feels good, but has been having some difficulty with finding foods that are palatable. Still taking zofran as needed. States n/v are much improved from early pregnancy overall.  Baby active.  Denies uterine cramping, vaginal bleeding or leaking of fluid. States over a month ago she had a fall, did not hit her stomach and therefore did not think to call or mention this at past visit, but she does have some residual ankle and foot pain.   O: Vitals: /60   Ht 1.549 m (5' 1\")   Wt 72.7 kg (160 lb 3.2 oz)   LMP 10/17/2018 (Exact Date)   BMI 30.27 kg/m    BMI= Body mass index is 30.27 kg/m .  Exam:  Constitutional: healthy, alert and no distress  Respiratory: respirations even and unlabored  Gastrointestinal: Abdomen soft, non-tender. Fundus measures appropriate for gestational age. Fetal heart tones hear without difficulty and within normal limits  : Deferred  Psychiatric: mentation appears normal and affect normal/bright  A:   Encounter Diagnosis   Name Primary?     Encounter for supervision of normal first pregnancy in second trimester Yes     P: Discussed GCT/repeat RPR for next visit, handout provided, reminded of longer appointment.  Tdap next visit; reviewed CDC recommendations and partner/family vaccination recommended as well.  Need for Rhogam? No, to be done next visit   Reviewed recommended diet, high in protein, and eating smaller frequent snacks that might help with ongoing nausea.   Encouraged patient to be seen with IM for imaging of foot for ongoing pain. Informed patient to call us if she experiences another fall or trauma.  Return to clinic 4 weeks    Kathya Mcdaniels CNM on 4/1/2019 at 5:24 PM          "

## 2019-04-02 ENCOUNTER — OFFICE VISIT (OUTPATIENT)
Dept: MATERNAL FETAL MEDICINE | Facility: CLINIC | Age: 29
End: 2019-04-02
Attending: ADVANCED PRACTICE MIDWIFE
Payer: COMMERCIAL

## 2019-04-02 ENCOUNTER — HOSPITAL ENCOUNTER (OUTPATIENT)
Dept: ULTRASOUND IMAGING | Facility: CLINIC | Age: 29
Discharge: HOME | End: 2019-04-02
Attending: ADVANCED PRACTICE MIDWIFE | Admitting: ADVANCED PRACTICE MIDWIFE
Payer: COMMERCIAL

## 2019-04-02 DIAGNOSIS — Z36.89 SCREENING, ANTENATAL, FOR FETAL ANATOMIC SURVEY: Primary | ICD-10-CM

## 2019-04-02 DIAGNOSIS — O26.90 PREGNANCY RELATED CONDITION, ANTEPARTUM: ICD-10-CM

## 2019-04-02 PROCEDURE — 76811 OB US DETAILED SNGL FETUS: CPT

## 2019-04-02 NOTE — PROGRESS NOTES
SUBJECTIVE:  Chief Complaint   Patient presents with     Urgent Care     swelling with pain of left ankle/injury from a fall 1.5 months ago.    .ident presents with a chief complaint of left ankle.  The injury occurred 6 weeks ago.   The injury happened while while walking.   How: trauma:  immediate pain  The patient complained of mild pain and has not had decreased ROM.    Pain exacerbated by movement    This is the first time this type of injury has occurred to this patient.     No past medical history on file.  No Known Allergies  Social History     Tobacco Use     Smoking status: Never Smoker     Smokeless tobacco: Never Used   Substance Use Topics     Alcohol use: No       ROSINTEGUMENTARY/SKIN: NEGATIVE for open wound/bleeding and NEGATIVE for bruising    EXAM:/74   Pulse 89   Temp 98.5  F (36.9  C) (Oral)   LMP 10/17/2018 (Exact Date)  Gen: healthy,alert,no distress  Extremity: ankle has pain with palpation and rom.   There is not compromise to the distal circulation.  Pulses are +2 and CRT is brisk.GENERAL APPEARANCE: healthy, alert and no distress  EXTREMITIES: peripheral pulses normal  SKIN: no suspicious lesions or rashes  NEURO: Normal strength and tone, sensory exam grossly normal, mentation intact and speech normal    Xray without acute findings, read by Rey Wiseman D.O.      ICD-10-CM    1. Ankle injury, left, initial encounter S99.912A XR Ankle Left G/E 3 Views     [facility]ERR[facility]     Lorne DE LA CRUZ

## 2019-04-02 NOTE — PROGRESS NOTES
"Please see \"Imaging\" tab under \"Chart Review\" for details of today's US.    Dedra Orourke, DO    "

## 2019-04-26 ENCOUNTER — PRENATAL OFFICE VISIT (OUTPATIENT)
Dept: OBGYN | Facility: CLINIC | Age: 29
End: 2019-04-26
Payer: COMMERCIAL

## 2019-04-26 VITALS — SYSTOLIC BLOOD PRESSURE: 110 MMHG | DIASTOLIC BLOOD PRESSURE: 72 MMHG | BODY MASS INDEX: 29.95 KG/M2 | WEIGHT: 158.5 LBS

## 2019-04-26 DIAGNOSIS — Z34.02 ENCOUNTER FOR SUPERVISION OF NORMAL FIRST PREGNANCY IN SECOND TRIMESTER: Primary | ICD-10-CM

## 2019-04-26 LAB
ERYTHROCYTE [DISTWIDTH] IN BLOOD BY AUTOMATED COUNT: 13.1 % (ref 10–15)
GLUCOSE 1H P 50 G GLC PO SERPL-MCNC: 117 MG/DL (ref 60–129)
HCT VFR BLD AUTO: 30.6 % (ref 35–47)
HGB BLD-MCNC: 10.4 G/DL (ref 11.7–15.7)
MCH RBC QN AUTO: 29.2 PG (ref 26.5–33)
MCHC RBC AUTO-ENTMCNC: 34 G/DL (ref 31.5–36.5)
MCV RBC AUTO: 86 FL (ref 78–100)
PLATELET # BLD AUTO: 158 10E9/L (ref 150–450)
RBC # BLD AUTO: 3.56 10E12/L (ref 3.8–5.2)
WBC # BLD AUTO: 5.1 10E9/L (ref 4–11)

## 2019-04-26 PROCEDURE — 0064U ANTB TP TOTAL&RPR IA QUAL: CPT | Performed by: ADVANCED PRACTICE MIDWIFE

## 2019-04-26 PROCEDURE — 36415 COLL VENOUS BLD VENIPUNCTURE: CPT | Performed by: ADVANCED PRACTICE MIDWIFE

## 2019-04-26 PROCEDURE — 85027 COMPLETE CBC AUTOMATED: CPT | Performed by: ADVANCED PRACTICE MIDWIFE

## 2019-04-26 PROCEDURE — 82950 GLUCOSE TEST: CPT | Performed by: ADVANCED PRACTICE MIDWIFE

## 2019-04-26 PROCEDURE — 99207 ZZC PRENATAL VISIT: CPT | Performed by: ADVANCED PRACTICE MIDWIFE

## 2019-04-26 NOTE — PROGRESS NOTES
S: Feels good. Only dealing with occasional n/v. Still using zofran PRN and states it is very helpful,  Baby active.  Denies uterine cramping, vaginal bleeding or leaking of fluid. Had questions about labor process/pain management.  O: Vitals: /72 (BP Location: Right arm, Patient Position: Chair, Cuff Size: Adult Regular)   Wt 71.9 kg (158 lb 8 oz)   LMP 10/17/2018 (Exact Date)   Breastfeeding? No   BMI 29.95 kg/m    BMI= Body mass index is 29.95 kg/m .  Exam:  Constitutional: healthy, alert and no distress  Respiratory: respirations even and unlabored  Gastrointestinal: Abdomen soft, non-tender. Fundus measures appropriate for gestational age. Fetal heart tones hear without difficulty and within normal limits  : Deferred  Psychiatric: mentation appears normal and affect normal/bright  A:     ICD-10-CM    1. Encounter for supervision of normal first pregnancy in second trimester Z34.02 Glucose tolerance gest screen 1 hour     Treponema Abs w Reflex to RPR and Titer     CBC with platelets     P: GCT/repeat RPR today, handout provided.    Requesting Tdap at future visit.  Need for Rhogam? No.   Discussed options for pain management in labor, patient is hoping for less intervention, but aware that she can ask for pain medication as needed. Encouraged patient to look into [employer] classes as she is an employee of [employer] and can take these classes for free.  Reviewed s/s of PTL.  Encouraged patient to call with any questions or concerns.  Return to clinic 2 weeks    Kathya Mcdaniels CNM on 4/26/2019 at 3:30 PM

## 2019-04-26 NOTE — NURSING NOTE
"Chief Complaint   Patient presents with     Prenatal Care     27 weeks 2 days       Initial /72 (BP Location: Right arm, Patient Position: Chair, Cuff Size: Adult Regular)   Wt 71.9 kg (158 lb 8 oz)   LMP 10/17/2018 (Exact Date)   Breastfeeding? No   BMI 29.95 kg/m   Estimated body mass index is 29.95 kg/m  as calculated from the following:    Height as of 19: 1.549 m (5' 1\").    Weight as of this encounter: 71.9 kg (158 lb 8 oz).  BP completed using cuff size: regular    Questioned patient about current smoking habits.  Pt. has never smoked.    27w2d      The following HM Due: GTT and 28 week labs      +FM daily   + nausea  + sharp pain couple days ago in upper right quad under breast      Neetu Garza, CMA on 2019 at 3:00 PM       "

## 2019-04-27 DIAGNOSIS — O99.019 ANTEPARTUM ANEMIA: Primary | ICD-10-CM

## 2019-04-27 RX ORDER — FERROUS SULFATE 325(65) MG
325 TABLET, DELAYED RELEASE (ENTERIC COATED) ORAL DAILY
Qty: 90 TABLET | Refills: 1 | Status: ON HOLD | OUTPATIENT
Start: 2019-04-27 | End: 2019-07-30

## 2019-04-28 LAB — T PALLIDUM AB SER QL: NONREACTIVE

## 2019-05-13 ENCOUNTER — PRENATAL OFFICE VISIT (OUTPATIENT)
Dept: OBGYN | Facility: CLINIC | Age: 29
End: 2019-05-13
Payer: COMMERCIAL

## 2019-05-13 VITALS
DIASTOLIC BLOOD PRESSURE: 58 MMHG | WEIGHT: 157.3 LBS | SYSTOLIC BLOOD PRESSURE: 120 MMHG | HEIGHT: 61 IN | BODY MASS INDEX: 29.7 KG/M2

## 2019-05-13 DIAGNOSIS — Z34.03 ENCOUNTER FOR SUPERVISION OF NORMAL FIRST PREGNANCY IN THIRD TRIMESTER: Primary | ICD-10-CM

## 2019-05-13 DIAGNOSIS — Z23 NEED FOR TDAP VACCINATION: ICD-10-CM

## 2019-05-13 PROCEDURE — 99207 ZZC PRENATAL VISIT: CPT | Performed by: ADVANCED PRACTICE MIDWIFE

## 2019-05-13 PROCEDURE — 90715 TDAP VACCINE 7 YRS/> IM: CPT | Performed by: ADVANCED PRACTICE MIDWIFE

## 2019-05-13 PROCEDURE — 90471 IMMUNIZATION ADMIN: CPT | Performed by: ADVANCED PRACTICE MIDWIFE

## 2019-05-13 ASSESSMENT — MIFFLIN-ST. JEOR: SCORE: 1375.89

## 2019-05-13 NOTE — PROGRESS NOTES
"S: Feels good overall.  Baby active.  Denies uterine cramping, vaginal bleeding or leaking of fluid. Has questions about due date and going past.  O: Vitals: /58   Ht 1.549 m (5' 1\")   Wt 71.4 kg (157 lb 4.8 oz)   LMP 10/17/2018 (Exact Date)   BMI 29.72 kg/m    BMI= Body mass index is 29.72 kg/m .  Exam:  Constitutional: healthy, alert and no distress  Respiratory: respirations even and unlabored  Gastrointestinal: Abdomen soft, non-tender. Fundus measures appropriate for gestational age. Fetal heart tones hear without difficulty and within normal limits  : Deferred  Psychiatric: mentation appears normal and affect normal/bright  A:   Encounter Diagnoses   Name Primary?     Need for Tdap vaccination      Encounter for supervision of normal first pregnancy in third trimester Yes     P: Discussed BABATUNDE and postdates options.  Encouraged patient to call with any questions or concerns.  Return to clinic 2 weeks    Kathya Mcdaniels CNM on 5/13/2019 at 5:38 PM                "

## 2019-05-13 NOTE — NURSING NOTE
"Chief Complaint   Patient presents with     Prenatal Care     29w5d       Initial /58   Ht 1.549 m (5' 1\")   Wt 71.4 kg (157 lb 4.8 oz)   LMP 10/17/2018 (Exact Date)   BMI 29.72 kg/m   Estimated body mass index is 29.72 kg/m  as calculated from the following:    Height as of this encounter: 1.549 m (5' 1\").    Weight as of this encounter: 71.4 kg (157 lb 4.8 oz).  BP completed using cuff size: regular    Questioned patient about current smoking habits.  Pt. has never smoked.          The following HM Due: NONE    Yusuf Carey MA             "

## 2019-06-03 ENCOUNTER — PRENATAL OFFICE VISIT (OUTPATIENT)
Dept: OBGYN | Facility: CLINIC | Age: 29
End: 2019-06-03
Payer: COMMERCIAL

## 2019-06-03 VITALS — DIASTOLIC BLOOD PRESSURE: 68 MMHG | BODY MASS INDEX: 29.49 KG/M2 | WEIGHT: 156.1 LBS | SYSTOLIC BLOOD PRESSURE: 108 MMHG

## 2019-06-03 DIAGNOSIS — Z34.03 ENCOUNTER FOR SUPERVISION OF NORMAL FIRST PREGNANCY IN THIRD TRIMESTER: Primary | ICD-10-CM

## 2019-06-03 PROCEDURE — 99207 ZZC PRENATAL VISIT: CPT | Performed by: ADVANCED PRACTICE MIDWIFE

## 2019-06-03 NOTE — NURSING NOTE
"Chief Complaint   Patient presents with     Prenatal Care     32 weeks 5 days       Initial /68 (BP Location: Left arm, Patient Position: Chair, Cuff Size: Adult Regular)   Wt 70.8 kg (156 lb 1.6 oz)   LMP 10/17/2018 (Exact Date)   Breastfeeding? No   BMI 29.49 kg/m   Estimated body mass index is 29.49 kg/m  as calculated from the following:    Height as of 19: 1.549 m (5' 1\").    Weight as of this encounter: 70.8 kg (156 lb 1.6 oz).  BP completed using cuff size: regular    Questioned patient about current smoking habits.  Pt. has never smoked.    32w5d      The following HM Due: NONE      +FM daily  + was sick over the weekend, fever, voimiting cough, cold          Neetu Garza, CMA on 6/3/2019 at 5:00 PM       "

## 2019-06-03 NOTE — PROGRESS NOTES
S: Feels okay. Recently got sick with URI symptoms, but starting to feel better today. Reports being febrile yesterday, called the nurse line.  Baby active.  Denies uterine cramping, vaginal bleeding or leaking of fluid  O: Vitals: /68 (BP Location: Left arm, Patient Position: Chair, Cuff Size: Adult Regular)   Wt 70.8 kg (156 lb 1.6 oz)   LMP 10/17/2018 (Exact Date)   Breastfeeding? No   BMI 29.49 kg/m    BMI= Body mass index is 29.49 kg/m .  Exam:  Constitutional: healthy, alert and no distress  Respiratory: respirations even and unlabored  Gastrointestinal: Abdomen soft, non-tender. Fundus measures appropriate for gestational age. Fetal heart tones hear without difficulty and within normal limits  : Deferred  Psychiatric: mentation appears normal and affect normal/bright  A:   Encounter Diagnosis   Name Primary?     Encounter for supervision of normal first pregnancy in third trimester Yes     P: Discussed patient options for postpartum contraception. Patient is planning: unsure. Previously using condoms for contraception. Has some concerns with hormonal birth control. Reviewed all options.  Encouraged patient to call with any questions or concerns.  Return to clinic 2 weeks    Kathya Mcdaniels CNM on 6/3/2019 at 5:20 PM

## 2019-06-21 ENCOUNTER — PRENATAL OFFICE VISIT (OUTPATIENT)
Dept: OBGYN | Facility: CLINIC | Age: 29
End: 2019-06-21
Payer: COMMERCIAL

## 2019-06-21 VITALS — SYSTOLIC BLOOD PRESSURE: 100 MMHG | WEIGHT: 157 LBS | BODY MASS INDEX: 29.66 KG/M2 | DIASTOLIC BLOOD PRESSURE: 64 MMHG

## 2019-06-21 DIAGNOSIS — Z34.03 ENCOUNTER FOR SUPERVISION OF NORMAL FIRST PREGNANCY IN THIRD TRIMESTER: Primary | ICD-10-CM

## 2019-06-21 PROCEDURE — 87653 STREP B DNA AMP PROBE: CPT | Performed by: ADVANCED PRACTICE MIDWIFE

## 2019-06-21 PROCEDURE — 99207 ZZC PRENATAL VISIT: CPT | Performed by: ADVANCED PRACTICE MIDWIFE

## 2019-06-21 NOTE — NURSING NOTE
"Chief Complaint   Patient presents with     Prenatal Care     35w 2d, GBS today, questions on anesthetic for dental work, c/o lower stomach/pelvic pain-denies bleeding       Initial /64 (BP Location: Right arm, Cuff Size: Adult Regular)   Wt 71.2 kg (157 lb)   LMP 10/17/2018 (Exact Date)   BMI 29.66 kg/m   Estimated body mass index is 29.66 kg/m  as calculated from the following:    Height as of 19: 1.549 m (5' 1\").    Weight as of this encounter: 71.2 kg (157 lb).  BP completed using cuff size: regular    Questioned patient about current smoking habits.  Pt. has never smoked.          The following HM Due: NONE  Anne Wiseman CMA      "

## 2019-06-21 NOTE — LETTER
June 21, 2019      Agustin Ortega  [address] [facility] [address]        To Whom It May Concern,      Please note that Agustin Ortega is being seen in my care for her current pregnancy and she is medically cleared to receive dental care. Please let me know if you have any questions.          Sincerely,        Kathya Mcdaniels CNM

## 2019-06-21 NOTE — PROGRESS NOTES
S: Feels good. Having some increased pelvic pressure and pubic symphysis pain. Baby active.  Denies uterine cramping, vaginal bleeding or leaking of fluid. No headache, increase in edema, no epigastric pain. Agreeable to SVE after GBS collection today.  O: Vitals: /64 (BP Location: Right arm, Cuff Size: Adult Regular)   Wt 71.2 kg (157 lb)   LMP 10/17/2018 (Exact Date)   BMI 29.66 kg/m    BMI= Body mass index is 29.66 kg/m .  Exam:  Constitutional: healthy, alert and no distress  Respiratory: respirations even and unlabored  Gastrointestinal: Abdomen soft, non-tender. Fundus measures appropriate for gestational age. Fetal heart tones hear without difficulty and within normal limits  : Normal external genitalia without lesions. SVE: closed/thick/high.  Psychiatric: mentation appears normal and affect normal/bright  A:     ICD-10-CM    1. Encounter for supervision of normal first pregnancy in third trimester Z34.03 Strep, Group B by PCR     P: Labor signs and symptoms discussed, aware of numbers to call  Discussed warning signs of PIH/preeclampsia and patient will monitor.  GBS screen completed.   Encouraged patient to call with any questions or concerns.  Return to clinic 1-2 weeks    Kathya Mcdaniels CNM on 6/21/2019 at 4:11 PM

## 2019-06-21 NOTE — PATIENT INSTRUCTIONS
SIGNS OF  LABOR    Labor is  if it happens more than three weeks before your due date.    It can be hard to know if you are in labor, since the symptoms can be like the normal feelings of pregnancy.  Often, the only difference is the symptoms increase or they don't go away.     Signs of  labor can include:      Contractions which can feel like period cramps or gas pain.  You may feel it in the lower part of your abdomen, in your back, or as a pressure feeling in your bottom.  It is often regular, coming every 5 or 10 minutes, and  lasting about 30-60 seconds. Some contractions are normal during pregnancy ([place] contractions) but if you are feeling more than 5-6 in one hour that is NOT normal    If this occurs empty your bladder, then drink 2-3 glasses of water, eat a snack, and lay down on your left side. Put your hand on your abdomen to count the contractions.  If after one hour of resting you have still had 5-6 contractions call your clinic right away.      If you feel a pop, gush, or trickle of fluid it may mean that your bag of water has broken and you should contact the clinic       You may also experience loose stools and/or rectal pressure       Listen to your body, if something doesn't seem right please call us at the clinic    Risk Factors      Previous  delivery    Bacterial Vaginosis- if you notice a fishy smell to your discharge or experience vaginal itching/discomfort you should be evaluated for infection    Smoking    Drug abuse    Adolescent (teen) pregnancy or advanced maternal age (AMA) age 35 and over    Dehydration (this may not cause  labor but it can cause contractions)    If you think you are in  labor we may do some lab testing in the clinic or send you to the hospital for evaluation    Please call us if you are concerned you are in  labor. Pager: [phone]    [facility]  [phone]

## 2019-06-23 LAB
GP B STREP DNA SPEC QL NAA+PROBE: NEGATIVE
SPECIMEN SOURCE: NORMAL

## 2019-07-01 ENCOUNTER — PRENATAL OFFICE VISIT (OUTPATIENT)
Dept: OBGYN | Facility: CLINIC | Age: 29
End: 2019-07-01
Payer: COMMERCIAL

## 2019-07-01 VITALS
HEIGHT: 61 IN | BODY MASS INDEX: 30.19 KG/M2 | WEIGHT: 159.9 LBS | DIASTOLIC BLOOD PRESSURE: 60 MMHG | SYSTOLIC BLOOD PRESSURE: 116 MMHG

## 2019-07-01 DIAGNOSIS — Z34.03 ENCOUNTER FOR SUPERVISION OF NORMAL FIRST PREGNANCY IN THIRD TRIMESTER: Primary | ICD-10-CM

## 2019-07-01 PROCEDURE — 99207 ZZC PRENATAL VISIT: CPT | Performed by: ADVANCED PRACTICE MIDWIFE

## 2019-07-01 ASSESSMENT — MIFFLIN-ST. JEOR: SCORE: 1387.68

## 2019-07-01 NOTE — PROGRESS NOTES
"S: Feels good, no concerns.  Baby active.  Has questions about circumcision for baby. Denies uterine cramping, vaginal bleeding or leaking of fluid. No headache, increase in edema, no epigastric pain.  O: Vitals: /60   Ht 1.549 m (5' 1\")   Wt 72.5 kg (159 lb 14.4 oz)   LMP 10/17/2018 (Exact Date)   BMI 30.21 kg/m    BMI= Body mass index is 30.21 kg/m .  Exam:  Constitutional: healthy, alert and no distress  Respiratory: respirations even and unlabored  Gastrointestinal: Abdomen soft, non-tender. Fundus measures appropriate for gestational age. Fetal heart tones hear without difficulty and within normal limits  : Deferred  Psychiatric: mentation appears normal and affect normal/bright  A:   Encounter Diagnosis   Name Primary?     Encounter for supervision of normal first pregnancy in third trimester Yes     P: Labor signs and symptoms discussed, aware of numbers to call  Discussed warning signs of PIH/preeclampsia and patient will monitor.  GBS screen completed. Discussed plans for labor.   Encouraged patient to call with any questions or concerns.  Return to clinic 1 weeks    Kathya Mcdaniels CNM on 7/1/2019 at 5:50 PM              "

## 2019-07-01 NOTE — NURSING NOTE
"Chief Complaint   Patient presents with     Prenatal Care     36w5d       Initial /60   Ht 1.549 m (5' 1\")   Wt 72.5 kg (159 lb 14.4 oz)   LMP 10/17/2018 (Exact Date)   BMI 30.21 kg/m   Estimated body mass index is 30.21 kg/m  as calculated from the following:    Height as of this encounter: 1.549 m (5' 1\").    Weight as of this encounter: 72.5 kg (159 lb 14.4 oz).  BP completed using cuff size: regular    Questioned patient about current smoking habits.  Pt. has never smoked.          The following HM Due: NONE    Yusuf Carey MA    "

## 2019-07-08 ENCOUNTER — APPOINTMENT (OUTPATIENT)
Dept: ULTRASOUND IMAGING | Facility: CLINIC | Age: 29
End: 2019-07-08
Attending: ADVANCED PRACTICE MIDWIFE
Payer: COMMERCIAL

## 2019-07-08 ENCOUNTER — TELEPHONE (OUTPATIENT)
Dept: OBGYN | Facility: CLINIC | Age: 29
End: 2019-07-08

## 2019-07-08 ENCOUNTER — HOSPITAL ENCOUNTER (OUTPATIENT)
Facility: CLINIC | Age: 29
Discharge: HOME | End: 2019-07-08
Attending: ADVANCED PRACTICE MIDWIFE | Admitting: ADVANCED PRACTICE MIDWIFE
Payer: COMMERCIAL

## 2019-07-08 VITALS
SYSTOLIC BLOOD PRESSURE: 122 MMHG | DIASTOLIC BLOOD PRESSURE: 84 MMHG | HEIGHT: 61 IN | RESPIRATION RATE: 18 BRPM | TEMPERATURE: 98 F | BODY MASS INDEX: 29.27 KG/M2 | WEIGHT: 155 LBS

## 2019-07-08 DIAGNOSIS — O28.8 AFI (AMNIOTIC FLUID INDEX) BORDERLINE LOW: Primary | ICD-10-CM

## 2019-07-08 PROCEDURE — 59025 FETAL NON-STRESS TEST: CPT

## 2019-07-08 PROCEDURE — 59025 FETAL NON-STRESS TEST: CPT | Mod: 26 | Performed by: ADVANCED PRACTICE MIDWIFE

## 2019-07-08 PROCEDURE — 76819 FETAL BIOPHYS PROFIL W/O NST: CPT

## 2019-07-08 PROCEDURE — G0463 HOSPITAL OUTPT CLINIC VISIT: HCPCS | Mod: 25

## 2019-07-08 PROCEDURE — 99213 OFFICE O/P EST LOW 20 MIN: CPT | Mod: 25 | Performed by: ADVANCED PRACTICE MIDWIFE

## 2019-07-08 RX ORDER — ONDANSETRON 2 MG/ML
4 INJECTION INTRAMUSCULAR; INTRAVENOUS EVERY 6 HOURS PRN
Status: DISCONTINUED | OUTPATIENT
Start: 2019-07-08 | End: 2019-07-08 | Stop reason: HOSPADM

## 2019-07-08 ASSESSMENT — MIFFLIN-ST. JEOR: SCORE: 1365.46

## 2019-07-08 NOTE — TELEPHONE ENCOUNTER
Patient calling;  37w5d    Patient has noticed decreased fetal movement since yesterday. This am has only felt 3 kicks.  No discharge, bleeding, pain or CX's  States is staying hydrated.  Spoke with on CNM on call  Recommend pt go to L&D. L&D notified.  Patient notified.  .Lolita Higgins RN

## 2019-07-08 NOTE — PROGRESS NOTES
"[facility] CNM TRIAGE NOTE    Agustin Ortega is a 28 year old  with and IUP at 37w5d who presents with complaint of decreased fetal movement. Baby has now been active for the past hour.     See previous note for full history     PHYSICAL EXAM:  /84   Temp 98  F (36.7  C) (Oral)   Resp 18   Ht 1.549 m (5' 1\")   Wt 70.3 kg (155 lb)   LMP 10/17/2018 (Exact Date)   BMI 29.29 kg/m      FHT's 140 with moderate variability  Accelerations: present   Decelerations:  absent        Contractions: Pt is not tia     Exam deferred      ASSESSMENT :   28 year old  with lacy IUP 37w5d   NST reactive after full 40 minute strip  BPP 10/10  RANGEL borderline low without diagnosis of oligohydramnios. Total RANGEL 5.9 cm (>recommended diagnostic level of 5), MVP 3.5 cm (> recommended diagnostic level of 2)        PLAN:  Curbside consult with Dr. Bagley OB, who agrees with assessment of borderline low RANGEL without diagnosis of oligohydramnios    Discharge home  Follow up within 1 week for prenatal visit and repeat BPP to assess borderline low RANGEL  Reviewed fetal kick counts and encouraged to call with any changes  Increase oral fluids    Teaching done r/t to s/s of labor, SROM, decreased fetal movement, comfort measures in third trimester.  Instructed to please refer to the discharge handouts, the RN triage line or on-call CNM for any questions or concerns.  Pt verbalizes understanding and agreement with current plan of care.    Aura Young CNM          "

## 2019-07-08 NOTE — PROGRESS NOTES
"[facility] CNM TRIAGE NOTE    Agustin Ortega is a 28 year old  with and IUP at 37w5d who presents with complaint of decreased fetal movement since yesterday morning. States she noticed baby was moving less yesterday and she tried drinking cold water & eating sweets, without success. Today she has only felt baby move 3 times. She has felt 1 movement since arriving to the hospital.    Denies contractions  Denies ROM   Denies vaginal bleeding  Present OB History at [facility]    Problems this pregnancy:   1. None    ROS:  Patient is alert and oriented      PHYSICAL EXAM:  /84   Temp 98  F (36.7  C) (Oral)   Resp 18   Ht 1.549 m (5' 1\")   Wt 70.3 kg (155 lb)   LMP 10/17/2018 (Exact Date)   BMI 29.29 kg/m      General: Healthy, no acute distress  Chest: Respirations even and unlabored  Abdomen: gravid, soft, non-tender. Cephalic presentation by Leopold's.   Bloody show: no  Cervix: Deferred  Membranes are intact   Psych: Oriented x 3, mood appropriate to setting    FHT's 140 with moderate variability  Accelerations: 2 accelerations (15x15) after continuing NST to full 40 minutes   Decelerations:  absent        Contractions: Pt is not tia           ASSESSMENT :   28 year old  with lacy IUP 37w5d for observation.    Decreased fetal movement  NST  Reactive after 40 minute strip  GBS negative and membranes intact         PLAN:  BPP ordered  Will reassess after resulted    Aura Young CNM          "

## 2019-07-08 NOTE — PROVIDER NOTIFICATION
07/08/19 1410   Provider Notification   Provider Name/Title rosalia weller   Method of Notification At Bedside   Request Evaluate in Person   Notification Reason Lab/Diagnostic Study   ROSALIA Lao at bedside to let pt know that sangita BPP was 8/8 and baby had a reactive NST and is ok to discharge to home.

## 2019-07-08 NOTE — PROVIDER NOTIFICATION
19 1235   Provider Notification   Provider Name/Title COLE Rizzo   Method of Notification At Bedside   Request Evaluate in Person   Notification Reason Patient Arrived    here at 37w5d for DFM.  Pt states that she hasnt been feeling the baby move as much as she normally does.  EFM applied x2 and explained.  COLE Lao did come to see pt and would like pt to have a BPP.  Will notify with results.

## 2019-07-08 NOTE — PLAN OF CARE
Data: Patient assessed in the [facility] for decreased fetal movement.  Cervical exam not examined.  Membranes intact.  Contractions none.  Action:  Presumed adequate fetal oxygenation documented (see flow record). Discharge instructions reviewed.  Patient instructed to report change in fetal movement, vaginal leaking of fluid or bleeding, abdominal pain, or any concerns related to the pregnancy to her nurse/physician.    Response: Orders to discharge home per Aura Young.  Patient verbalized understanding of education and verbalized agreement with plan. Discharged to home at 1419.

## 2019-07-08 NOTE — DISCHARGE INSTRUCTIONS
Discharge Instruction for Undelivered Patients      You were seen for: Fetal Assessment  We Consulted: COLE Lao  You had (Test or Medicine):fetal monitoring, NST and BPP     Diet:   Drink 8 to 12 glasses of liquids (milk, juice, water) every day.  You may eat meals and snacks.     Activity:  Count fetal kicks everyday (see handout)  Call your doctor or nurse midwife if your baby is moving less than usual.     Call your provider if you notice:  Swelling in your face or increased swelling in your hands or legs.  Headaches that are not relieved by Tylenol (acetaminophen).  Changes in your vision (blurring: seeing spots or stars.)  Nausea (sick to your stomach) and vomiting (throwing up).   Weight gain of 5 pounds or more per week.  Heartburn that doesn't go away.  Signs of bladder infection: pain when you urinate (use the toilet), need to go more often and more urgently.  The bag of busch (rupture of membranes) breaks, or you notice leaking in your underwear.  Bright red blood in your underwear.  Abdominal (lower belly) or stomach pain.  For first baby: Contractions (tightening) less than 5 minutes apart for one hour or more.  Increase or change in vaginal discharge (note the color and amount)      Follow-up:  Follow up on Monday at regular appointment and they would also like you to have an ultrasound done that day as well

## 2019-07-15 ENCOUNTER — PRENATAL OFFICE VISIT (OUTPATIENT)
Dept: OBGYN | Facility: CLINIC | Age: 29
End: 2019-07-15
Payer: COMMERCIAL

## 2019-07-15 ENCOUNTER — ANCILLARY PROCEDURE (OUTPATIENT)
Dept: ULTRASOUND IMAGING | Facility: CLINIC | Age: 29
End: 2019-07-15
Attending: ADVANCED PRACTICE MIDWIFE
Payer: COMMERCIAL

## 2019-07-15 VITALS — WEIGHT: 157 LBS | BODY MASS INDEX: 29.66 KG/M2 | DIASTOLIC BLOOD PRESSURE: 72 MMHG | SYSTOLIC BLOOD PRESSURE: 114 MMHG

## 2019-07-15 DIAGNOSIS — Z34.03 ENCOUNTER FOR SUPERVISION OF NORMAL FIRST PREGNANCY IN THIRD TRIMESTER: Primary | ICD-10-CM

## 2019-07-15 DIAGNOSIS — O28.8 AFI (AMNIOTIC FLUID INDEX) BORDERLINE LOW: ICD-10-CM

## 2019-07-15 PROCEDURE — 59025 FETAL NON-STRESS TEST: CPT | Performed by: ADVANCED PRACTICE MIDWIFE

## 2019-07-15 PROCEDURE — 76819 FETAL BIOPHYS PROFIL W/O NST: CPT | Performed by: OBSTETRICS & GYNECOLOGY

## 2019-07-15 PROCEDURE — 99207 ZZC PRENATAL VISIT: CPT | Performed by: ADVANCED PRACTICE MIDWIFE

## 2019-07-15 NOTE — PROGRESS NOTES
S: Doing well, here with [place]. Just had BPP, scored 6/8, 2 off for breathing. RANGEL normal and increased from recent triage admission. Baby active.  Denies uterine cramping, vaginal bleeding or leaking of fluid. No headache, increase in edema, no epigastric pain.   O: Vitals: /72 (BP Location: Left arm, Cuff Size: Adult Regular)   Wt 71.2 kg (157 lb)   LMP 10/17/2018 (Exact Date)   BMI 29.66 kg/m    BMI= Body mass index is 29.66 kg/m .  Exam:  Constitutional: healthy, alert and no distress  Respiratory: respirations even and unlabored  Gastrointestinal: Abdomen soft, non-tender. Fundus measures appropriate for gestational age. Fetal heart tones hear without difficulty and within normal limits  NST: Reactive. Baseline 120, accels present, no decels, no contractions.   : Deferred  Psychiatric: mentation appears normal and affect normal/bright  A:   Encounter Diagnosis   Name Primary?     Encounter for supervision of normal first pregnancy in third trimester Yes     P: Labor signs and symptoms discussed, aware of numbers to call.  Discussed  testing results. BPP 8/10 and improved RANGEL. Reviewed kick counts.     Encouraged patient to call with any questions or concerns.  Return to clinic 1 weeks    Kathya Mcdaniels CNM on 7/15/2019 at 6:06 PM

## 2019-07-22 ENCOUNTER — PRENATAL OFFICE VISIT (OUTPATIENT)
Dept: OBGYN | Facility: CLINIC | Age: 29
End: 2019-07-22
Payer: COMMERCIAL

## 2019-07-22 VITALS
SYSTOLIC BLOOD PRESSURE: 120 MMHG | HEIGHT: 61 IN | BODY MASS INDEX: 30.19 KG/M2 | WEIGHT: 159.9 LBS | DIASTOLIC BLOOD PRESSURE: 66 MMHG

## 2019-07-22 DIAGNOSIS — Z34.03 ENCOUNTER FOR SUPERVISION OF NORMAL FIRST PREGNANCY IN THIRD TRIMESTER: Primary | ICD-10-CM

## 2019-07-22 PROCEDURE — 99207 ZZC PRENATAL VISIT: CPT | Performed by: ADVANCED PRACTICE MIDWIFE

## 2019-07-22 ASSESSMENT — MIFFLIN-ST. JEOR: SCORE: 1387.68

## 2019-07-22 NOTE — NURSING NOTE
"Chief Complaint   Patient presents with     Prenatal Care       Initial /66   Ht 1.549 m (5' 1\")   Wt 72.5 kg (159 lb 14.4 oz)   LMP 10/17/2018 (Exact Date)   BMI 30.21 kg/m   Estimated body mass index is 30.21 kg/m  as calculated from the following:    Height as of this encounter: 1.549 m (5' 1\").    Weight as of this encounter: 72.5 kg (159 lb 14.4 oz).  BP completed using cuff size: regular    Questioned patient about current smoking habits.  Pt. has never smoked.          The following HM Due: NONE    Yusuf Carey MA        "

## 2019-07-22 NOTE — PROGRESS NOTES
"S: Feels good, tired today as she did not sleep well.  Baby active and has been since her previous admission to triage.  Denies uterine cramping, vaginal bleeding or leaking of fluid. No headache, increase in edema, no epigastric pain.   O: Vitals: /66   Ht 1.549 m (5' 1\")   Wt 72.5 kg (159 lb 14.4 oz)   LMP 10/17/2018 (Exact Date)   BMI 30.21 kg/m    BMI= Body mass index is 30.21 kg/m .  Exam:  Constitutional: healthy, alert and no distress  Respiratory: respirations even and unlabored  Gastrointestinal: Abdomen soft, non-tender. Fundus measures appropriate for gestational age. Fetal heart tones hear without difficulty and within normal limits  : Normal external genitalia without lesions. FT/40/-3, soft, mid.  Psychiatric: mentation appears normal and affect normal/bright  A:   Encounter Diagnosis   Name Primary?     Encounter for supervision of normal first pregnancy in third trimester Yes     P: Labor signs and symptoms discussed, aware of numbers to call.   Reviewed fetal kick counts and when to call for decreased fetal movement.  Discussed postdates options. Recommend BPP next Monday prior to next prenatal visit as she will be 40w5d. She and  will decide next week about IOL at 41wks or expectant management.  Encouraged patient to call with any questions or concerns.  Return to clinic 1 weeks    Kathya Mcdaniels CNM on 7/22/2019 at 5:26 PM        "

## 2019-07-25 ENCOUNTER — APPOINTMENT (OUTPATIENT)
Dept: ULTRASOUND IMAGING | Facility: CLINIC | Age: 29
End: 2019-07-25
Attending: ADVANCED PRACTICE MIDWIFE
Payer: COMMERCIAL

## 2019-07-25 ENCOUNTER — TELEPHONE (OUTPATIENT)
Dept: OBGYN | Facility: CLINIC | Age: 29
End: 2019-07-25

## 2019-07-25 ENCOUNTER — NURSE TRIAGE (OUTPATIENT)
Dept: NURSING | Facility: CLINIC | Age: 29
End: 2019-07-25

## 2019-07-25 ENCOUNTER — HOSPITAL ENCOUNTER (OUTPATIENT)
Facility: CLINIC | Age: 29
Discharge: HOME | End: 2019-07-25
Attending: ADVANCED PRACTICE MIDWIFE | Admitting: ADVANCED PRACTICE MIDWIFE
Payer: COMMERCIAL

## 2019-07-25 VITALS
TEMPERATURE: 98.7 F | DIASTOLIC BLOOD PRESSURE: 71 MMHG | HEIGHT: 61 IN | BODY MASS INDEX: 29.64 KG/M2 | RESPIRATION RATE: 20 BRPM | WEIGHT: 157 LBS | SYSTOLIC BLOOD PRESSURE: 118 MMHG

## 2019-07-25 PROBLEM — Z36.89 ENCOUNTER FOR TRIAGE IN PREGNANT PATIENT: Status: ACTIVE | Noted: 2019-07-25

## 2019-07-25 PROCEDURE — 76819 FETAL BIOPHYS PROFIL W/O NST: CPT

## 2019-07-25 PROCEDURE — 99213 OFFICE O/P EST LOW 20 MIN: CPT | Mod: 25 | Performed by: ADVANCED PRACTICE MIDWIFE

## 2019-07-25 PROCEDURE — G0463 HOSPITAL OUTPT CLINIC VISIT: HCPCS | Mod: 25

## 2019-07-25 PROCEDURE — 59025 FETAL NON-STRESS TEST: CPT

## 2019-07-25 PROCEDURE — 59025 FETAL NON-STRESS TEST: CPT | Mod: 26 | Performed by: ADVANCED PRACTICE MIDWIFE

## 2019-07-25 ASSESSMENT — MIFFLIN-ST. JEOR: SCORE: 1374.53

## 2019-07-26 ENCOUNTER — HOSPITAL ENCOUNTER (OUTPATIENT)
Facility: CLINIC | Age: 29
Discharge: HOME | End: 2019-07-27
Attending: ADVANCED PRACTICE MIDWIFE | Admitting: ADVANCED PRACTICE MIDWIFE
Payer: COMMERCIAL

## 2019-07-26 ENCOUNTER — TELEPHONE (OUTPATIENT)
Dept: OBGYN | Facility: CLINIC | Age: 29
End: 2019-07-26

## 2019-07-26 VITALS — DIASTOLIC BLOOD PRESSURE: 73 MMHG | TEMPERATURE: 97.9 F | SYSTOLIC BLOOD PRESSURE: 119 MMHG | RESPIRATION RATE: 16 BRPM

## 2019-07-26 PROCEDURE — G0463 HOSPITAL OUTPT CLINIC VISIT: HCPCS

## 2019-07-26 ASSESSMENT — ACTIVITIES OF DAILY LIVING (ADL)
DRESS: 0-->INDEPENDENT
BATHING: 0-->INDEPENDENT
COGNITION: 0 - NO COGNITION ISSUES REPORTED
TRANSFERRING: 0-->INDEPENDENT
RETIRED_EATING: 0-->INDEPENDENT
AMBULATION: 0-->INDEPENDENT
TOILETING: 0-->INDEPENDENT
RETIRED_COMMUNICATION: 0-->UNDERSTANDS/COMMUNICATES WITHOUT DIFFICULTY
SWALLOWING: 0-->SWALLOWS FOODS/LIQUIDS WITHOUT DIFFICULTY

## 2019-07-26 NOTE — DISCHARGE INSTRUCTIONS
Discharge Instruction for Undelivered Patients      You were seen for: Fetal Assessment  We Consulted: COLE Guo  You had (Test or Medicine):Non Stress Test and Biophysical profile ultrasound     Diet:   Drink 8 to 12 glasses of liquids (milk, juice, water) every day.  You may eat meals and snacks.     Activity:  Count fetal kicks everyday (see handout)  Call your doctor or nurse midwife if your baby is moving less than usual.     Call your provider if you notice:  Swelling in your face or increased swelling in your hands or legs.  Headaches that are not relieved by Tylenol (acetaminophen).  Changes in your vision (blurring: seeing spots or stars.)  Nausea (sick to your stomach) and vomiting (throwing up).   Weight gain of 5 pounds or more per week.  Heartburn that doesn't go away.  Signs of bladder infection: pain when you urinate (use the toilet), need to go more often and more urgently.  The bag of busch (rupture of membranes) breaks, or you notice leaking in your underwear.  Bright red blood in your underwear.  Abdominal (lower belly) or stomach pain.  For first baby: Contractions (tightening) less than 5 minutes apart for one hour or more.  Increase or change in vaginal discharge (note the color and amount)      Follow-up:  As scheduled in the clinic

## 2019-07-26 NOTE — TELEPHONE ENCOUNTER
Patient called COLE and reports that she feels that the baby is much less active today. Has tried drinking juice and cold water with no change in fetal activity. Advised patient to come into the [facility] for further evaluation.    WAN Boateng, COLE

## 2019-07-26 NOTE — PROGRESS NOTES
"[facility] CENTER CNM TRIAGE NOTE    Agustin Ortega is a 28 year old  with and IUP at 40w1d who presents with complaint of decreased fetal movement    Patient states baby is less active.  Denies ROM   Denies vaginal bleeding  Present OB History at [facility] with the CNMs.     Problems this pregnancy:   1. Borderline low RANGEL    ROS:  Patient is alert and oriented      PHYSICAL EXAM:  /71   Temp 98.7  F (37.1  C) (Oral)   Resp 20   Ht 1.549 m (5' 1\")   Wt 71.2 kg (157 lb)   LMP 10/17/2018 (Exact Date)   BMI 29.66 kg/m      FHT's 128 with moderate variability  Accelerations: present   Decelerations:  absent        Contractions: Pt is not tia     Abdomen: gravid, nontender  Bloody show: no  Cervix: Deferred  Membranes are intact       ASSESSMENT :   28 year old  with lacy IUP 40w1d for observation.    NST  reactive  GBS negative and membranes intact     BPP 8/10    PLAN:  Discharge home  Continue routine prenatal care    Patient has follow up BPP scheduled in four days.    Teaching done r/t to s/s of labor, SROM, decreased fetal movement, comfort measures in third trimester.  Instructed to please refer to the discharge handouts, the RN triage line or on-call CNM for any questions or concerns.  Pt verbalizes understanding and agreement with current plan of care.    SONJA RODRIGUEZ CNM        "

## 2019-07-26 NOTE — PROVIDER NOTIFICATION
07/25/19 2055   Provider Notification   Provider Name/Title COLE Guo   Method of Notification At Bedside   Request Evaluate in Person   Notification Reason Patient Arrived;Status Update   COLE Guo at bedside to assess pt. Pt states small movements today at 1700 but overall feeling less fetal movement than normal. COLE Guo ordered NST and BPP.

## 2019-07-26 NOTE — PLAN OF CARE
Data: Patient presented to [facility]: 2019  8:43 PM.  Reason for maternal/fetal assessment is decreased fetal movement. Patient reports small movement felt today at 1700 but overall has felt baby moving less than normal all day.  Patient is a .  Prenatal record reviewed. Pregnancy has been uncomplicated.  Gestational Age 40w1d. VSS. Fetal movement present. Patient denies uterine contractions, leaking of vaginal fluid/rupture of membranes, vaginal bleeding, abdominal pain, pelvic pressure, nausea, vomiting, headache, visual disturbances, epigastric or URQ pain, significant edema. Support person is present.   Action: Verbal consent for EFM. Triage assessment completed. Bill of rights reviewed.  Response: Patient verbalized agreement with plan. Will contact Dr Sari Mcdaniels with update and further orders.

## 2019-07-26 NOTE — PROVIDER NOTIFICATION
07/25/19 2225   Provider Notification   Provider Name/Title COLE Guo   Method of Notification At Bedside   Request Evaluate in Person   Notification Reason Status Update   COLE Guo at bedside to discuss BPP results. MD gave discharge orders.

## 2019-07-26 NOTE — PLAN OF CARE
Data: Patient assessed in the [facility] for decreased fetal movement.  Cervical exam not examined.  Membranes intact.  Contractions none.  Action:  Presumed adequate fetal oxygenation documented (see flow record). Discharge instructions reviewed.  Patient instructed to report change in fetal movement, vaginal leaking of fluid or bleeding, abdominal pain, or any concerns related to the pregnancy to her nurse/physician.    Response: Orders to discharge home per Sari Mcdaniels.  Patient verbalized understanding of education and verbalized agreement with plan. Discharged to home at 2240.

## 2019-07-26 NOTE — PROGRESS NOTES
"MATERNAL ASSESSMENT CENTER CNM TRIAGE NOTE    Agustin Ortega is a 28 year old  with and IUP at 40w1d who presents with complaint of decreased fetal movement today. Has tried drinking cold fluids and sugary drinks with no improvement in fetal movement patterns. Patient was advised to come into [facility]thing Center for further evaluation.    Patient states baby is active.  Denies ROM   Denies vaginal bleeding  Present OB History at [facility] with the CNMs.     Problems this pregnancy:   1. Borderline low RANGEL without diagnosis of oliog    ROS:  Patient is alert and oriented      PHYSICAL EXAM:  /71   Temp 98.7  F (37.1  C) (Oral)   Resp 20   Ht 1.549 m (5' 1\")   Wt 71.2 kg (157 lb)   LMP 10/17/2018 (Exact Date)   BMI 29.66 kg/m      FHT's 128 with moderate variability  Accelerations: pending NST   Decelerations:  Pending NST        Contractions: Pt is not tia     Abdomen: gravid, nontender  Bloody show: no  Cervix: deferred  Membranes are intact       ASSESSMENT :   28 year old  with lacy IUP 40w1d for observation.    NST  pending  GBS negative and membranes intact         PLAN:    NST pending.  BPP pending.    WAN Boateng, COLE          "

## 2019-07-26 NOTE — TELEPHONE ENCOUNTER
Patient called stating 40w1d pregnant and hasn't felt baby move in 2 hrs. States has eaten sweet food, and drank water and no increase in activity which has worked in the past. Paged to midwife Sari Mcdaniels to call patient directly. Patient informed that if no call in 20 minutes call back, verbalizes understanding.    Reason for Disposition    [1] Pregnant 23 or more weeks AND [2] baby moving less today AND [3] unable or unwilling to perform kick count    Additional Information    Negative: New blurred vision or vision changes    Negative: [1] SEVERE headache AND [2] not relieved with acetaminophen (e.g., Tylenol)    Negative: Leakage of fluid from vagina    Protocols used: PREGNANCY - DECREASED FETAL MOVEMENT-A-AH

## 2019-07-27 ENCOUNTER — ANESTHESIA (OUTPATIENT)
Dept: OBGYN | Facility: CLINIC | Age: 29
End: 2019-07-27
Payer: COMMERCIAL

## 2019-07-27 ENCOUNTER — TELEPHONE (OUTPATIENT)
Dept: OBGYN | Facility: CLINIC | Age: 29
End: 2019-07-27

## 2019-07-27 ENCOUNTER — HOSPITAL ENCOUNTER (INPATIENT)
Facility: CLINIC | Age: 29
LOS: 3 days | Discharge: HOME HEALTH | End: 2019-07-30
Attending: ADVANCED PRACTICE MIDWIFE | Admitting: ADVANCED PRACTICE MIDWIFE
Payer: COMMERCIAL

## 2019-07-27 ENCOUNTER — ANESTHESIA EVENT (OUTPATIENT)
Dept: OBGYN | Facility: CLINIC | Age: 29
End: 2019-07-27
Payer: COMMERCIAL

## 2019-07-27 DIAGNOSIS — Z34.03 ENCOUNTER FOR SUPERVISION OF NORMAL FIRST PREGNANCY IN THIRD TRIMESTER: Primary | ICD-10-CM

## 2019-07-27 LAB
ABO + RH BLD: NORMAL
ABO + RH BLD: NORMAL
BASOPHILS # BLD AUTO: 0 10E9/L (ref 0–0.2)
BASOPHILS NFR BLD AUTO: 0.2 %
BLD GP AB SCN SERPL QL: NORMAL
BLOOD BANK CMNT PATIENT-IMP: NORMAL
DIFFERENTIAL METHOD BLD: ABNORMAL
EOSINOPHIL # BLD AUTO: 0 10E9/L (ref 0–0.7)
EOSINOPHIL NFR BLD AUTO: 0.3 %
ERYTHROCYTE [DISTWIDTH] IN BLOOD BY AUTOMATED COUNT: 13.6 % (ref 10–15)
HCT VFR BLD AUTO: 33.4 % (ref 35–47)
HGB BLD-MCNC: 11.1 G/DL (ref 11.7–15.7)
IMM GRANULOCYTES # BLD: 0 10E9/L (ref 0–0.4)
IMM GRANULOCYTES NFR BLD: 0.3 %
LYMPHOCYTES # BLD AUTO: 1 10E9/L (ref 0.8–5.3)
LYMPHOCYTES NFR BLD AUTO: 16 %
MCH RBC QN AUTO: 29.1 PG (ref 26.5–33)
MCHC RBC AUTO-ENTMCNC: 33.2 G/DL (ref 31.5–36.5)
MCV RBC AUTO: 87 FL (ref 78–100)
MONOCYTES # BLD AUTO: 0.4 10E9/L (ref 0–1.3)
MONOCYTES NFR BLD AUTO: 7.4 %
NEUTROPHILS # BLD AUTO: 4.5 10E9/L (ref 1.6–8.3)
NEUTROPHILS NFR BLD AUTO: 75.8 %
NRBC # BLD AUTO: 0 10*3/UL
NRBC BLD AUTO-RTO: 0 /100
PLATELET # BLD AUTO: 122 10E9/L (ref 150–450)
RBC # BLD AUTO: 3.82 10E12/L (ref 3.8–5.2)
SPECIMEN EXP DATE BLD: NORMAL
WBC # BLD AUTO: 5.9 10E9/L (ref 4–11)

## 2019-07-27 PROCEDURE — 86901 BLOOD TYPING SEROLOGIC RH(D): CPT | Performed by: ADVANCED PRACTICE MIDWIFE

## 2019-07-27 PROCEDURE — 12000000 ZZH R&B MED SURG/OB

## 2019-07-27 PROCEDURE — 86900 BLOOD TYPING SEROLOGIC ABO: CPT | Performed by: ADVANCED PRACTICE MIDWIFE

## 2019-07-27 PROCEDURE — 25800030 ZZH RX IP 258 OP 636: Performed by: ADVANCED PRACTICE MIDWIFE

## 2019-07-27 PROCEDURE — 59025 FETAL NON-STRESS TEST: CPT | Mod: 26 | Performed by: ADVANCED PRACTICE MIDWIFE

## 2019-07-27 PROCEDURE — 40000671 ZZH STATISTIC ANESTHESIA CASE

## 2019-07-27 PROCEDURE — 3E0R3BZ INTRODUCTION OF ANESTHETIC AGENT INTO SPINAL CANAL, PERCUTANEOUS APPROACH: ICD-10-PCS | Performed by: ANESTHESIOLOGY

## 2019-07-27 PROCEDURE — 86780 TREPONEMA PALLIDUM: CPT | Performed by: ADVANCED PRACTICE MIDWIFE

## 2019-07-27 PROCEDURE — 25000128 H RX IP 250 OP 636: Performed by: ADVANCED PRACTICE MIDWIFE

## 2019-07-27 PROCEDURE — G0463 HOSPITAL OUTPT CLINIC VISIT: HCPCS

## 2019-07-27 PROCEDURE — 96372 THER/PROPH/DIAG INJ SC/IM: CPT

## 2019-07-27 PROCEDURE — 86850 RBC ANTIBODY SCREEN: CPT | Performed by: ADVANCED PRACTICE MIDWIFE

## 2019-07-27 PROCEDURE — 36415 COLL VENOUS BLD VENIPUNCTURE: CPT | Performed by: ADVANCED PRACTICE MIDWIFE

## 2019-07-27 PROCEDURE — 85025 COMPLETE CBC W/AUTO DIFF WBC: CPT | Performed by: ADVANCED PRACTICE MIDWIFE

## 2019-07-27 PROCEDURE — 37000011 ZZH ANESTHESIA WARD SERVICE

## 2019-07-27 PROCEDURE — 00HU33Z INSERTION OF INFUSION DEVICE INTO SPINAL CANAL, PERCUTANEOUS APPROACH: ICD-10-PCS | Performed by: ANESTHESIOLOGY

## 2019-07-27 PROCEDURE — 99214 OFFICE O/P EST MOD 30 MIN: CPT | Mod: 25 | Performed by: ADVANCED PRACTICE MIDWIFE

## 2019-07-27 PROCEDURE — 25000132 ZZH RX MED GY IP 250 OP 250 PS 637: Performed by: ADVANCED PRACTICE MIDWIFE

## 2019-07-27 RX ORDER — PROMETHAZINE HYDROCHLORIDE 25 MG/1
25 TABLET ORAL ONCE
Status: COMPLETED | OUTPATIENT
Start: 2019-07-27 | End: 2019-07-27

## 2019-07-27 RX ORDER — OXYCODONE AND ACETAMINOPHEN 5; 325 MG/1; MG/1
1 TABLET ORAL
Status: DISCONTINUED | OUTPATIENT
Start: 2019-07-27 | End: 2019-07-28

## 2019-07-27 RX ORDER — METHYLERGONOVINE MALEATE 0.2 MG/ML
200 INJECTION INTRAVENOUS
Status: DISCONTINUED | OUTPATIENT
Start: 2019-07-27 | End: 2019-07-28

## 2019-07-27 RX ORDER — EPHEDRINE SULFATE 50 MG/ML
5 INJECTION, SOLUTION INTRAMUSCULAR; INTRAVENOUS; SUBCUTANEOUS
Status: DISCONTINUED | OUTPATIENT
Start: 2019-07-27 | End: 2019-07-28

## 2019-07-27 RX ORDER — OXYTOCIN 10 [USP'U]/ML
10 INJECTION, SOLUTION INTRAMUSCULAR; INTRAVENOUS
Status: DISCONTINUED | OUTPATIENT
Start: 2019-07-27 | End: 2019-07-28

## 2019-07-27 RX ORDER — BUPIVACAINE HCL/0.9 % NACL/PF 0.125 %
PLASTIC BAG, INJECTION (ML) EPIDURAL CONTINUOUS
Status: DISCONTINUED | OUTPATIENT
Start: 2019-07-27 | End: 2019-07-28

## 2019-07-27 RX ORDER — NALOXONE HYDROCHLORIDE 0.4 MG/ML
.1-.4 INJECTION, SOLUTION INTRAMUSCULAR; INTRAVENOUS; SUBCUTANEOUS
Status: DISCONTINUED | OUTPATIENT
Start: 2019-07-27 | End: 2019-07-28 | Stop reason: ALTCHOICE

## 2019-07-27 RX ORDER — OXYTOCIN/0.9 % SODIUM CHLORIDE 30/500 ML
100-340 PLASTIC BAG, INJECTION (ML) INTRAVENOUS CONTINUOUS PRN
Status: DISCONTINUED | OUTPATIENT
Start: 2019-07-27 | End: 2019-07-28

## 2019-07-27 RX ORDER — ACETAMINOPHEN 325 MG/1
650 TABLET ORAL EVERY 4 HOURS PRN
Status: DISCONTINUED | OUTPATIENT
Start: 2019-07-27 | End: 2019-07-28

## 2019-07-27 RX ORDER — NALBUPHINE HYDROCHLORIDE 10 MG/ML
2.5-5 INJECTION, SOLUTION INTRAMUSCULAR; INTRAVENOUS; SUBCUTANEOUS EVERY 6 HOURS PRN
Status: DISCONTINUED | OUTPATIENT
Start: 2019-07-27 | End: 2019-07-28

## 2019-07-27 RX ORDER — HYDROXYZINE PAMOATE 25 MG/1
25 CAPSULE ORAL 3 TIMES DAILY PRN
Qty: 30 CAPSULE | Refills: 0 | Status: ON HOLD | OUTPATIENT
Start: 2019-07-27 | End: 2019-07-30

## 2019-07-27 RX ORDER — MORPHINE SULFATE 10 MG/ML
10 INJECTION, SOLUTION INTRAMUSCULAR; INTRAVENOUS ONCE
Status: COMPLETED | OUTPATIENT
Start: 2019-07-27 | End: 2019-07-27

## 2019-07-27 RX ORDER — CARBOPROST TROMETHAMINE 250 UG/ML
250 INJECTION, SOLUTION INTRAMUSCULAR
Status: DISCONTINUED | OUTPATIENT
Start: 2019-07-27 | End: 2019-07-28

## 2019-07-27 RX ORDER — ONDANSETRON 2 MG/ML
4 INJECTION INTRAMUSCULAR; INTRAVENOUS EVERY 6 HOURS PRN
Status: DISCONTINUED | OUTPATIENT
Start: 2019-07-27 | End: 2019-07-28

## 2019-07-27 RX ORDER — FENTANYL CITRATE 50 UG/ML
50-100 INJECTION, SOLUTION INTRAMUSCULAR; INTRAVENOUS
Status: DISCONTINUED | OUTPATIENT
Start: 2019-07-27 | End: 2019-07-28

## 2019-07-27 RX ORDER — SODIUM CHLORIDE, SODIUM LACTATE, POTASSIUM CHLORIDE, CALCIUM CHLORIDE 600; 310; 30; 20 MG/100ML; MG/100ML; MG/100ML; MG/100ML
INJECTION, SOLUTION INTRAVENOUS CONTINUOUS
Status: DISCONTINUED | OUTPATIENT
Start: 2019-07-27 | End: 2019-07-28

## 2019-07-27 RX ORDER — IBUPROFEN 800 MG/1
800 TABLET, FILM COATED ORAL
Status: COMPLETED | OUTPATIENT
Start: 2019-07-27 | End: 2019-07-28

## 2019-07-27 RX ADMIN — FENTANYL CITRATE 100 MCG: 50 INJECTION INTRAMUSCULAR; INTRAVENOUS at 23:31

## 2019-07-27 RX ADMIN — SODIUM CHLORIDE, POTASSIUM CHLORIDE, SODIUM LACTATE AND CALCIUM CHLORIDE: 600; 310; 30; 20 INJECTION, SOLUTION INTRAVENOUS at 22:00

## 2019-07-27 RX ADMIN — PROMETHAZINE HYDROCHLORIDE 25 MG: 25 TABLET ORAL at 02:01

## 2019-07-27 RX ADMIN — MORPHINE SULFATE 10 MG: 10 INJECTION INTRAVENOUS at 03:41

## 2019-07-27 RX ADMIN — FENTANYL CITRATE 100 MCG: 50 INJECTION INTRAMUSCULAR; INTRAVENOUS at 22:03

## 2019-07-27 ASSESSMENT — ACTIVITIES OF DAILY LIVING (ADL)
RETIRED_COMMUNICATION: 0-->UNDERSTANDS/COMMUNICATES WITHOUT DIFFICULTY
SWALLOWING: 0-->SWALLOWS FOODS/LIQUIDS WITHOUT DIFFICULTY
TRANSFERRING: 0-->INDEPENDENT
BATHING: 0-->INDEPENDENT
RETIRED_EATING: 0-->INDEPENDENT
AMBULATION: 0-->INDEPENDENT
COGNITION: 0 - NO COGNITION ISSUES REPORTED
DRESS: 0-->INDEPENDENT
TOILETING: 0-->INDEPENDENT

## 2019-07-27 NOTE — PROVIDER NOTIFICATION
07/26/19 2330   Provider Notification   Provider Name/Title COLE Ferrell   Method of Notification At Bedside   to discuss POC, SVE 1/50/-1. Plan for pt to ambulate for 1-2 hours and then recheck cervix.

## 2019-07-27 NOTE — PROVIDER NOTIFICATION
07/27/19 0105   Provider Notification   Provider Name/Title COLE Ferrell   Method of Notification In Department   Notification Reason Status Update   Updated Mariaelena that pt reports feeling more uncomfortable after walking and using ball, states she thinks cx are closer together and getting more painful. Pt states she would like to walk around for a little while longer and then would like CNM to recheck cervix and reevaluate plan.

## 2019-07-27 NOTE — PROVIDER NOTIFICATION
07/27/19 0230   Provider Notification   Provider Name/Title COLE Ferrell   Method of Notification Phone   Request Evaluate in Person   Notification Reason Status Update   pt continues to contact every 1-4 minutes, visibly more uncomfortable and now shaking. Requesting CNM come to bedside to evaluate.

## 2019-07-27 NOTE — PROGRESS NOTES
MATERNAL ASSESSMENT CENTER CNM TRIAGE NOTE    Agustin Ortega is a 28 year old  with and IUP at 40w3d who presents with complaint of contractions.     Patient states baby is active.  Denies ROM   Denies vaginal bleeding  Present OB History at [facility].    Problems this pregnancy:   1. Borderline low RANGEL, max vertical pocket WNL.     ROS:  Patient is alert and oriented    PHYSICAL EXAM:  /73   Temp 97.9  F (36.6  C) (Oral)   Resp 16   LMP 10/17/2018 (Exact Date)     FHT's 130's with moderate variability  Accelerations: present   Decelerations:  absent        Contractions: Pt is tia every 5-7 minutes, lasting 60 seconds and palpates mild     Abdomen: gravid  Bloody show: yes   Cervix: 1/ 50%/ Posterior/ average/ -1  Membranes are intact       ASSESSMENT :   28 year old  with lacy IUP 40w3d in latent labor  NST  reactive  GBS negative and membranes intact    PLAN:  Discharge home.  Continue routine prenatal care, next visit scheduled 2019.  Patient slept through the night after morphine. She reports still feeling contractions but not as strong or frequently. Patient desires to go home with vistaril 25mg PO TID prescription. Reviewed warning signs and when to call back. Patient verbalizes understanding and agrees with plan.      Teaching done r/t to s/s of labor, SROM, decreased fetal movement, comfort measures in third trimester.  Instructed to please refer to the discharge handouts, the RN triage line or on-call CNM for any questions or concerns.  Pt verbalizes understanding and agreement with current plan of care.    WAN Bolaños CNM

## 2019-07-27 NOTE — PROGRESS NOTES
Pt tearful, requesting to take shower before morphine administration. Monitors removed x2 and pt set up for shower.

## 2019-07-27 NOTE — PROGRESS NOTES
MATERNAL ASSESSMENT CENTER CNM TRIAGE NOTE    Agustin Ortega is a 28 year old  with and IUP at 40w3d who presents with complaint of contractions    Patient states baby is active.  Denies ROM   Denies vaginal bleeding  Present OB History at [facility]    Problems this pregnancy:   1. Borderline low RANGEL, max vertical pocket WNL.     ROS:  Patient is alert and oriented    PHYSICAL EXAM:  /73   Temp 97.9  F (36.6  C) (Oral)   Resp 16   LMP 10/17/2018 (Exact Date)     FHT's 130's with moderate variability  Accelerations: present   Decelerations:  absent        Contractions: Pt is tia every 1-4 minutes, lasting 60 seconds and palpates mild     Abdomen: gravid  Bloody show: yes   Cervix: 1/ 50%/ Posterior/ soft/ -1  Membranes are intact       ASSESSMENT :   28 year old  with lacy IUP 40w3d for observation.    NST  reactive  GBS negative and membranes intact    PLAN:  Patient was given one dose of phenergan 25mg PO. After she started to contract more frequently and rated the contractions as stronger. Cervical exam did not change. Patient to be kept for further observation and morphine sleep. Morphine 10mg IM once ordered. Will reevaluate patient when she wakes up and either discharge to home or keep for induction. Patient verbalizes understanding and agrees with plan.     WAN Bolaños CNM

## 2019-07-27 NOTE — TELEPHONE ENCOUNTER
Returned patient page. She reports contractions since 1700 every 5 minutes, mild. No leaking of fluid/vb. +FM. Patient desires to labor at home longer. Reviewed warning signs and when to call back. Patient verbalizes understanding and agrees with plan. Mariaelena Maldonado CNM

## 2019-07-27 NOTE — PROGRESS NOTES
MATERNAL ASSESSMENT CENTER CNM TRIAGE NOTE    Agustin Ortega is a 28 year old  with and IUP at 40w2d who presents with complaint of contractions since 1700.     Patient states baby is active.  Denies ROM   Denies vaginal bleeding  Present OB History at [facility]    Problems this pregnancy:   1. Borderline low RANGEL, max vertical pocket is WNL    ROS:  Patient is alert and oriented      PHYSICAL EXAM:  /73   Temp 97.9  F (36.6  C) (Oral)   Resp 16   LMP 10/17/2018 (Exact Date)     FHT's 140's with moderate variability  Accelerations: present   Decelerations:  absent        Contractions: Pt is tia every 5-6 minutes, lasting 60 seconds and palpates mild     Abdomen: gravid  Bloody show: yes  Cervix: 1/ 50%/ Posterior/ soft/ -1  Membranes are intact       ASSESSMENT :   28 year old  with lacy IUP 40w2d for observation.  NST  reactive  GBS negative and membranes intact    PLAN:  Patient to ambulate/use birthing ball for 1-2hrs. Will reassess and form plan of care at that point. Patient verbalizes understanding and agrees with plan.     WAN Bolaños CNM

## 2019-07-27 NOTE — PROVIDER NOTIFICATION
07/27/19 0124   Provider Notification   Provider Name/Title COLE Ferrell   Method of Notification At Bedside   SVE unchanged per CNM. VORB to Give phenergan PO and discharge pt to home. Pt agreeable with plan.

## 2019-07-27 NOTE — PROGRESS NOTES
Data: Patient presented to Birthplace: 2019 11:02 PM.  Reason for maternal/fetal assessment is uterine contractions. Patient reports cx began around 1700 and have been around 5 minutes apart, pt states pain is 4/10 and in lower abdomen.  Patient is a .  Prenatal record reviewed. Pregnancy has been uncomplicated..  Gestational Age 40w2d. VSS. Fetal movement present. Patient denies leaking of vaginal fluid/rupture of membranes, pelvic pressure, nausea, vomiting, headache, visual disturbances, epigastric or URQ pain. Support person is present.   Action: Verbal consent for EFM. Triage assessment completed. Bill of rights reviewed.  Response: Patient verbalized agreement with plan. Will contact Dr Mariaelena Maldonado with update and further orders.

## 2019-07-27 NOTE — PROVIDER NOTIFICATION
07/27/19 0638   Provider Notification   Provider Name/Title COLE Ferrell   Method of Notification At Bedside   Request Evaluate in Person   Mariaelena at bedside to discuss POC with pt.

## 2019-07-27 NOTE — PROVIDER NOTIFICATION
07/27/19 0145   Provider Notification   Provider Name/Title COLE Ferrell   Method of Notification Phone   Request Evaluate - Remote   Notification Reason Status Update   Updated CNM of cx occurring 1-3 minutes apart. Clarified if she would still like pt to discharge to home at this time. TORB to administer phenergan, have pt drink water and monitor for 20-30 minutes if cx space okay to discharge to home, if not update CNM for further orders.

## 2019-07-27 NOTE — PROGRESS NOTES
MATERNAL ASSESSMENT CENTER CNM TRIAGE NOTE    Agustin Ortega is a 28 year old  with and IUP at 40w3d who presents with complaint of contractions since 1700.     Patient states baby is active.  Denies ROM   Denies vaginal bleeding  Present OB History at [facility].     Problems this pregnancy:   1. Borderline low RANGEL, max vertical pocket WNL.     ROS:  Patient is alert and oriented      PHYSICAL EXAM:  /73   Temp 97.9  F (36.6  C) (Oral)   Resp 16   LMP 10/17/2018 (Exact Date)     FHT's 130's with moderate variability  Accelerations: present   Decelerations:  absent        Contractions: Pt is tia every 5-6 minutes, lasting 60 seconds and palpates mild     Abdomen: gravid  Bloody show: yes  Cervix: 1/ 50%/ Posterior/ soft/ -1  Membranes are intact       ASSESSMENT :   28 year old  with lacy IUP 40w3d in latent labor  NST  reactive  GBS negative and membranes intact    PLAN:  Discharge home.  Continue routine prenatal care, has BPP and prenatal visit scheduled for 2019.     Patient ambulated and used birthing ball for 2hrs. She reports some stronger contractions, some mild. She reports that she is very tired. Discharge to home with phenergan 25mg PO single dose. Educated on medication. Reviewed warning signs and when to return to hospital. Cervical ripening scheduled for 41 weeks. Patient verbalizes understanding and agrees with plan.    Teaching done r/t to s/s of labor, SROM, decreased fetal movement, comfort measures in third trimester.  Instructed to please refer to the discharge handouts, the RN triage line or on-call CNM for any questions or concerns.  Pt verbalizes understanding and agreement with current plan of care.    WAN BolañosM

## 2019-07-27 NOTE — PROGRESS NOTES
Data: Patient assessed in the Birthplace for uterine contractions.  Cervical exam 1/50/-1.  Membranes intact.  Contractions 5-6 minutes apart.  Action:  Presumed adequate fetal oxygenation documented (see flow record). Discharge instructions reviewed.  Patient instructed to report change in fetal movement, vaginal leaking of fluid or bleeding, abdominal pain, or any concerns related to the pregnancy to her nurse/physician.    Response: Orders to discharge home per Mariaelena Maldonado.  Patient verbalized understanding of education and verbalized agreement with plan. Discharged to home via wheelchair at 0710.

## 2019-07-27 NOTE — DISCHARGE INSTRUCTIONS
Discharge Instruction for Undelivered Patients      You were seen for: Labor Assessment  We Consulted: COLE Ferrell  You had (Test or Medicine): uterine and fetal monitoring, cervical exam, phenergan and morphine    Diet:   Drink 8 to 12 glasses of liquids (milk, juice, water) every day.  You may eat meals and snacks.     Activity:  Count fetal kicks everyday (see handout)  Call your doctor or nurse midwife if your baby is moving less than usual.     Call your provider if you notice:  Swelling in your face or increased swelling in your hands or legs.  Headaches that are not relieved by Tylenol (acetaminophen).  Changes in your vision (blurring: seeing spots or stars.)  Nausea (sick to your stomach) and vomiting (throwing up).   Weight gain of 5 pounds or more per week.  Heartburn that doesn't go away.  Signs of bladder infection: pain when you urinate (use the toilet), need to go more often and more urgently.  The bag of busch (rupture of membranes) breaks, or you notice leaking in your underwear.  Bright red blood in your underwear.  Abdominal (lower belly) or stomach pain.  For first baby: Contractions (tightening) less than 5 minutes apart for one hour or more.  Second (plus) baby: Contractions (tightening) less than 10 minutes apart and getting stronger.  *If less than 34 weeks: Contractions (tightenings) more than 6 times in one hour.  Increase or change in vaginal discharge (note the color and amount)    Follow-up:  As scheduled in the clinic

## 2019-07-28 LAB — T PALLIDUM AB SER QL: NONREACTIVE

## 2019-07-28 PROCEDURE — 0KQM0ZZ REPAIR PERINEUM MUSCLE, OPEN APPROACH: ICD-10-PCS | Performed by: ADVANCED PRACTICE MIDWIFE

## 2019-07-28 PROCEDURE — 25000132 ZZH RX MED GY IP 250 OP 250 PS 637: Performed by: ADVANCED PRACTICE MIDWIFE

## 2019-07-28 PROCEDURE — 12000000 ZZH R&B MED SURG/OB

## 2019-07-28 PROCEDURE — 27110038 ZZH RX 271: Performed by: ANESTHESIOLOGY

## 2019-07-28 PROCEDURE — 25000125 ZZHC RX 250: Performed by: ADVANCED PRACTICE MIDWIFE

## 2019-07-28 PROCEDURE — 10907ZC DRAINAGE OF AMNIOTIC FLUID, THERAPEUTIC FROM PRODUCTS OF CONCEPTION, VIA NATURAL OR ARTIFICIAL OPENING: ICD-10-PCS | Performed by: ADVANCED PRACTICE MIDWIFE

## 2019-07-28 PROCEDURE — 59400 OBSTETRICAL CARE: CPT | Performed by: ADVANCED PRACTICE MIDWIFE

## 2019-07-28 PROCEDURE — 25000128 H RX IP 250 OP 636: Performed by: ADVANCED PRACTICE MIDWIFE

## 2019-07-28 PROCEDURE — 59025 FETAL NON-STRESS TEST: CPT | Mod: 26 | Performed by: ADVANCED PRACTICE MIDWIFE

## 2019-07-28 PROCEDURE — 25000128 H RX IP 250 OP 636: Performed by: ANESTHESIOLOGY

## 2019-07-28 PROCEDURE — 25800030 ZZH RX IP 258 OP 636: Performed by: ADVANCED PRACTICE MIDWIFE

## 2019-07-28 PROCEDURE — 72200001 ZZH LABOR CARE VAGINAL DELIVERY SINGLE

## 2019-07-28 PROCEDURE — 25800030 ZZH RX IP 258 OP 636: Performed by: ANESTHESIOLOGY

## 2019-07-28 RX ORDER — OXYTOCIN/0.9 % SODIUM CHLORIDE 30/500 ML
100 PLASTIC BAG, INJECTION (ML) INTRAVENOUS CONTINUOUS
Status: DISCONTINUED | OUTPATIENT
Start: 2019-07-28 | End: 2019-07-30 | Stop reason: HOSPADM

## 2019-07-28 RX ORDER — AMOXICILLIN 250 MG
2 CAPSULE ORAL 2 TIMES DAILY
Status: DISCONTINUED | OUTPATIENT
Start: 2019-07-28 | End: 2019-07-30 | Stop reason: HOSPADM

## 2019-07-28 RX ORDER — HYDROCORTISONE 2.5 %
CREAM (GRAM) TOPICAL 3 TIMES DAILY PRN
Status: DISCONTINUED | OUTPATIENT
Start: 2019-07-28 | End: 2019-07-30 | Stop reason: HOSPADM

## 2019-07-28 RX ORDER — OXYTOCIN/0.9 % SODIUM CHLORIDE 30/500 ML
1-24 PLASTIC BAG, INJECTION (ML) INTRAVENOUS CONTINUOUS
Status: DISCONTINUED | OUTPATIENT
Start: 2019-07-28 | End: 2019-07-28

## 2019-07-28 RX ORDER — ACETAMINOPHEN 325 MG/1
650 TABLET ORAL EVERY 4 HOURS PRN
Status: DISCONTINUED | OUTPATIENT
Start: 2019-07-28 | End: 2019-07-30 | Stop reason: HOSPADM

## 2019-07-28 RX ORDER — BISACODYL 10 MG
10 SUPPOSITORY, RECTAL RECTAL DAILY PRN
Status: DISCONTINUED | OUTPATIENT
Start: 2019-07-30 | End: 2019-07-30 | Stop reason: HOSPADM

## 2019-07-28 RX ORDER — OXYTOCIN/0.9 % SODIUM CHLORIDE 30/500 ML
340 PLASTIC BAG, INJECTION (ML) INTRAVENOUS CONTINUOUS PRN
Status: DISCONTINUED | OUTPATIENT
Start: 2019-07-28 | End: 2019-07-30 | Stop reason: HOSPADM

## 2019-07-28 RX ORDER — OXYTOCIN 10 [USP'U]/ML
10 INJECTION, SOLUTION INTRAMUSCULAR; INTRAVENOUS
Status: DISCONTINUED | OUTPATIENT
Start: 2019-07-28 | End: 2019-07-30 | Stop reason: HOSPADM

## 2019-07-28 RX ORDER — LIDOCAINE 40 MG/G
CREAM TOPICAL
Status: DISCONTINUED | OUTPATIENT
Start: 2019-07-28 | End: 2019-07-28

## 2019-07-28 RX ORDER — NALOXONE HYDROCHLORIDE 0.4 MG/ML
.1-.4 INJECTION, SOLUTION INTRAMUSCULAR; INTRAVENOUS; SUBCUTANEOUS
Status: DISCONTINUED | OUTPATIENT
Start: 2019-07-28 | End: 2019-07-30 | Stop reason: HOSPADM

## 2019-07-28 RX ORDER — AMOXICILLIN 250 MG
1 CAPSULE ORAL 2 TIMES DAILY
Status: DISCONTINUED | OUTPATIENT
Start: 2019-07-28 | End: 2019-07-30 | Stop reason: HOSPADM

## 2019-07-28 RX ORDER — IBUPROFEN 800 MG/1
800 TABLET, FILM COATED ORAL EVERY 6 HOURS PRN
Status: DISCONTINUED | OUTPATIENT
Start: 2019-07-28 | End: 2019-07-30 | Stop reason: HOSPADM

## 2019-07-28 RX ORDER — LANOLIN 100 %
OINTMENT (GRAM) TOPICAL
Status: DISCONTINUED | OUTPATIENT
Start: 2019-07-28 | End: 2019-07-30 | Stop reason: HOSPADM

## 2019-07-28 RX ADMIN — IBUPROFEN 800 MG: 800 TABLET ORAL at 16:39

## 2019-07-28 RX ADMIN — SENNOSIDES AND DOCUSATE SODIUM 1 TABLET: 8.6; 5 TABLET ORAL at 22:32

## 2019-07-28 RX ADMIN — ONDANSETRON HYDROCHLORIDE 4 MG: 2 INJECTION, SOLUTION INTRAMUSCULAR; INTRAVENOUS at 12:15

## 2019-07-28 RX ADMIN — ACETAMINOPHEN 650 MG: 325 TABLET, FILM COATED ORAL at 22:33

## 2019-07-28 RX ADMIN — LIDOCAINE HYDROCHLORIDE 20 ML: 10 INJECTION, SOLUTION EPIDURAL; INFILTRATION; INTRACAUDAL; PERINEURAL at 14:34

## 2019-07-28 RX ADMIN — SODIUM CHLORIDE, POTASSIUM CHLORIDE, SODIUM LACTATE AND CALCIUM CHLORIDE 1000 ML: 600; 310; 30; 20 INJECTION, SOLUTION INTRAVENOUS at 01:12

## 2019-07-28 RX ADMIN — Medication: at 02:15

## 2019-07-28 RX ADMIN — IBUPROFEN 800 MG: 800 TABLET ORAL at 22:32

## 2019-07-28 RX ADMIN — OXYTOCIN 2 MILLI-UNITS/MIN: 10 INJECTION INTRAVENOUS at 04:12

## 2019-07-28 RX ADMIN — ACETAMINOPHEN 650 MG: 325 TABLET, FILM COATED ORAL at 18:21

## 2019-07-28 RX ADMIN — SODIUM CHLORIDE, POTASSIUM CHLORIDE, SODIUM LACTATE AND CALCIUM CHLORIDE: 600; 310; 30; 20 INJECTION, SOLUTION INTRAVENOUS at 02:31

## 2019-07-28 NOTE — PLAN OF CARE
Data: Agustin Ortega transferred to [facility] via wheelchair at 1730. Baby transferred via parent's arms.  Action: Receiving unit notified of transfer: Yes. Patient and family notified of room change. Report given to PUSHPA Sandhu at 1800. Belongings sent to receiving unit. Accompanied by Registered Nurse. Oriented patient to surroundings. Call light within reach. ID bands double-checked with receiving RN.  Response: Patient tolerated transfer and is stable.

## 2019-07-28 NOTE — PROVIDER NOTIFICATION
07/28/19 0158   Provider Notification   Provider Name/Title MDA   Method of Notification Electronic Page   requested MDA come to bedside to place epidural.

## 2019-07-28 NOTE — ANESTHESIA PROCEDURE NOTES
Peripheral nerve/Neuraxial procedure note : epidural catheter  Pre-Procedure  Performed by Remigio Montero MD  Referred by DEO  Location: OB    Procedure Times:7/28/2019 2:00 AM and 7/28/2019 2:14 AM  Pre-Anesthestic Checklist: patient identified, IV checked, site marked, risks and benefits discussed, informed consent, monitors and equipment checked, pre-op evaluation and at physician/surgeon's request    Timeout  Correct Patient: Yes   Correct Procedure: Yes   Correct Site: Yes   Correct Laterality: Yes and N/A   Correct Position: Yes   Site Marked: Yes, N/A   .   Procedure Documentation    .    Procedure:    Epidural catheter.     .  .       Assessment/Narrative  .  .  . Comments:  Pre-Procedure  Performed by Remigio Montero MD  Location: OB.      PreAnesthestic Checklist: patient identified, IV checked, risks and benefits discussed, informed consent obtained, monitors and equipment checked, pre-op evaluation and at physician/surgeon's request.    Timeout   Correct Patient: Yes  Correct Procedure: Epidural catheter placement  Correct Site: Yes   Correct Position: Yes    Procedure Documentation  Procedure:   Epidural catheter block for Labor    Patient currently in labor and she and OBMD request a labor epidural to control her labor pains. Patient was interviewed and examined. Procedure and risks including but not limited to bleeding, infection, nerve injury, paralysis, PDPH, and inadequate block requiring intervention discussed with patient. Questions answered. This epidural is to be placed in anticipation of vaginal delivery.  She consents to the epidural procedure.  Time-out was performed.  I or my partners remain immediately available for management of any issues or complications and will monitor at appropriate intervals.  Procedure: Patient sitting. Betadine prep x 3. Sterile drape applied.  Mask and gloves used.  Lidocaine 1%  local infiltration at L 3-4.  17 G. Tuohy needle at L3-4 by loss of  resistance into epidural space.  No CSF, paresthesia or blood. 1.5 % Lidocaine with 1:200,000 Epinephrine 5cc test dose. Epidural catheter inserted w/o resistance to 5 cm in epidural space. Then 0.25% bupivicaine 10 cc with NS 5 cc.  Aspiration negative for blood and CSF.   Negative for neuro change, paresthesia or symptoms of intravascular injection or intrathecal injection.  Infusion orders written and infusion of 0.125% bupivicaine 15cc per hour started.    Remigio Montero MD

## 2019-07-28 NOTE — PROGRESS NOTES
CNM PROGRESS NOTE    SUBJECTIVE:  Patient resting comfortably with epidural.     OBJECTIVE:  /55   Temp 98.9  F (37.2  C) (Oral)   Resp 16   LMP 10/17/2018 (Exact Date)     Fetal heart tones: Baseline 130's   Variability: moderate  Accelerations: present  Decelerations: absent    Contractions: Pt is tia every 3-4 minutes, lasting 60 seconds and palpates strong    Cervix: 9/ 100% / 0, Vtx  ROM: AROM for moderate meconium fluid    Pitocin- 16 mu/min.  Antibiotics- none  Cervical ripening: N/A    ASSESSMENT:  IUP @ 40w4d active labor   GBS- negative     PLAN:   comfort measures prn   Anticipate   Labor augmentation with Pitocin    WAN Bolaños CNM

## 2019-07-28 NOTE — PROVIDER NOTIFICATION
07/28/19 0250   Provider Notification   Provider Name/Title [place] FERNANDOOBINNA    Method of Notification Phone   Request Evaluate - Remote   Notification Reason Status Update   Updated re epidural received. Pt comfortable. cx 2-8 minutes apart, has been difficult to monitor via toco. Fetal tracing moderate with accels, intermittent VD and 1 ED. Plan to reposition at 0315 and place catheter. Orders received to SVE, if no change or cx space consistently to >5 minutes start pitocin augmentation.

## 2019-07-28 NOTE — PROVIDER NOTIFICATION
07/28/19 0046   Provider Notification   Provider Name/Title Mariaelena YEBOAH CNM   Method of Notification Phone   Request Evaluate - Remote   Notification Reason Status Update   Updated re: pt increased pain with cx despite them spacing to 8-9 minutes apart, palpating mild. Pt has receievd 2 doses of fentanyl and would like to walk around for a little while and then get epidural. Confirmed with CNM that pt is okay to get epidural whenever she desires. RN will flush IV while pt ambulates and then plan for pt to get epidural next.

## 2019-07-28 NOTE — TELEPHONE ENCOUNTER
Returned patient page. Patient reports that she has continued to contract irregularly throughout the day. No leaking of fluid/vb. +FM. Patient sounds very uncomfortable on the phone. Advised patient to come to hospital for evaluation. Mariaelena Maldonado CNM

## 2019-07-28 NOTE — ANESTHESIA PREPROCEDURE EVALUATION
Anesthesia Pre-Procedure Evaluation    Patient: Agustin Ortega   MRN: [id number] : 1990          Preoperative Diagnosis: * No pre-op diagnosis entered *    * No procedures listed *    History reviewed. No pertinent past medical history.  Past Surgical History:   Procedure Laterality Date     ENT SURGERY       Anesthesia Evaluation       history and physical reviewed .             ROS/MED HX    ENT/Pulmonary:  - neg pulmonary ROS     Neurologic:  - neg neurologic ROS     Cardiovascular:  - neg cardiovascular ROS       METS/Exercise Tolerance:     Hematologic:         Musculoskeletal:         GI/Hepatic:     (+) GERD      (-) hepatitis   Renal/Genitourinary:         Endo:         Psychiatric:         Infectious Disease:         Malignancy:         Other:                     neg OB ROS            Physical Exam  Normal systems: cardiovascular and pulmonary    Airway   Mallampati: II    Dental     Cardiovascular       Pulmonary             Lab Results   Component Value Date    WBC 5.9 2019    HGB 11.1 (L) 2019    HCT 33.4 (L) 2019     (L) 2019    CRP 23.0 (H) 2018    SED 19 2018     2018    POTASSIUM 3.7 2018    CHLORIDE 108 2018    CO2 28 2018    BUN 5 (L) 2018    CR 0.45 (L) 2018    GLC 85 2018    SARAH 8.7 2018    MAG 1.9 2017    ALBUMIN 3.7 2018    PROTTOTAL 6.9 2018    ALT 15 2018    AST 19 2018    ALKPHOS 67 2018    BILITOTAL 0.3 2018    LIPASE 80 2015    AMYLASE 28 (L) 2015    TSH 1.46 2018    HCG Negative 2015    HCGS Negative 10/20/2018       Preop Vitals  BP Readings from Last 3 Encounters:   19 126/75   19 119/73   19 118/71    Pulse Readings from Last 3 Encounters:   19 89   18 73   18 100      Resp Readings from Last 3 Encounters:   19 17   19 16   19 20    SpO2 Readings from Last 3  "Encounters:   11/27/18 99%   07/30/18 98%   05/22/18 95%      Temp Readings from Last 1 Encounters:   07/27/19 98.7  F (37.1  C) (Oral)    Ht Readings from Last 1 Encounters:   07/25/19 1.549 m (5' 1\")      Wt Readings from Last 1 Encounters:   07/25/19 71.2 kg (157 lb)    Estimated body mass index is 29.66 kg/m  as calculated from the following:    Height as of 7/25/19: 1.549 m (5' 1\").    Weight as of 7/25/19: 71.2 kg (157 lb).       Anesthesia Plan      History & Physical Review      ASA Status:  2 .  OB Epidural Asa: 2            Postoperative Care      Consents  Anesthetic plan, risks, benefits and alternatives discussed with:  Patient..                 Remigio Montero MD                    .  "

## 2019-07-28 NOTE — H&P
Labor Admission History and Physical    Agustin Ortega is a 28 year old female,  -American,   ,     Patient's last menstrual period was 10/17/2018 (exact date)., Estimated Date of Delivery: 2019 is calculated from lmp and verified with U/S     Pt is admitted to the Birthplace at [facility] on 2019 at 9:26 PM     in early labor.  Membranes are intact    HPI:   Baby active Yes  Labor start time 2100, when did contractions become regular.  Membranes are intact.  Bloody show Yes   Any changes with medical history since last prenatal visit No  Declines syphilis screening, no.        PRENATAL COURSE  Prenatal care began at 9 wks gestation for a total of 11 prenatal visits.  Total wt gain -9lbs  Prenatal Blood Pressure: WNL  Prenatal course was essentially uncomplicated    HISTORY  No Known Allergies  No past medical history on file.  Past Surgical History:   Procedure Laterality Date     ENT SURGERY       Family History   Problem Relation Age of Onset     Diabetes Father      Hypertension Father      Hyperlipidemia Father      Cerebrovascular Disease Father 60     Cancer Maternal Grandmother      Cancer Maternal Grandfather      Diabetes Brother 31        Type 2     Social History     Tobacco Use     Smoking status: Never Smoker     Smokeless tobacco: Never Used   Substance Use Topics     Alcohol use: No     OB History    Para Term  AB Living   1 0 0 0 0 0   SAB TAB Ectopic Multiple Live Births   0 0 0 0 0      # Outcome Date GA Lbr Tip/2nd Weight Sex Delivery Anes PTL Lv   1 Current                LABS:     Lab Results   Component Value Date    ABO O 2019       Lab Results   Component Value Date    RH Pos 2019     Rhogam not indicated  RUBELLAABIGG: immune  Anti treponema: negative   No results found for: HIV  Lab Results   Component Value Date    HGB 10.4 2019      Lab Results   Component Value Date    HEPBANG Nonreactive 2019     Lab  Results   Component Value Date    GBS Negative 06/21/2019       ROS  Pt denies significant constitutional symptoms including fever and/or malaise.  Pt denies significant respiratory, cardiovacular, GI, or muscular/skeletal complaints.    Neuro: Denies HA and visual changes  Psych: Denies depression and anxiety    PHYSICAL EXAM:  LMP 10/17/2018 (Exact Date)   General appearance:  healthy, alert and active   Heart: RRR  Lungs: CTA bilaterally, normal respiratory effort  Abdomen: gravid, single vertex fetus, non-tender, EFW 7 lbs.   Legs: reflexes 2+ bilaterally, no clonus, no edema     Contractions: Pt is tia every 5 minutes, lasting 60 seconds and palpates mild    Fetal heart tones: baseline 130's with moderate variability, accelerationspresent and decelerations absent  Category  1         fetal heart rate tracing    Pelvic Exam: 2/ 80/ Posterior/ soft/ -1  BLOODY SHOW:: yes   Membranes as listed above    ASSESSMENT:  IUP @ 40.3 wks gestation  in early labor  NST  reactive  Parity: Primip  GBS negative and membranes intact  Category  1         fetal heart rate tracing  Prodromal labor.     PLAN:  Admit - see IP orders  Pain medication, patient requesting IV fentanyl now. Thinks she wants an epidural later.     Mariaelena Maldonado CNM

## 2019-07-28 NOTE — PROVIDER NOTIFICATION
07/28/19 0943   Vaginal Exam   Method sterile exam per CNM   Vaginal Bleeding bloody show   Cervical Position mid-position   Cervical Dilation (cm) 9   Cervical Effacement 100%   Fetal Station 0   Membranes   Membrane Status Ruptured    Sac Identifier Sac 1   Rupture Type AROM   Rupture Date 07/28/19   Rupture Time 0943   Amniotic Fluid Color Meconium   Amniotic Fluid Odor Normal   Amniotic Fluid Amount Small   Provider Notification   Provider Name/Title Mariaelena YEBOAH CNM   Method of Notification At Bedside   Request Evaluate in Person   Notification Reason SVE;Membrane Status

## 2019-07-28 NOTE — PROVIDER NOTIFICATION
07/27/19 2108   Vaginal Exam   Method sterile exam per COLE  (per Mariaelena YEBOAH CNM)   Vaginal Bleeding bloody show   Cervical Dilation (cm) 2   Cervical Effacement 80%   Fetal Station -2   Membranes   Membrane Status Intact   Provider Notification   Provider Name/Title Mariaelena YEBOAH CNM   Method of Notification At Bedside   Request Evaluate in Person   Notification Reason Patient Arrived;SVE     Intrapartum orders received.

## 2019-07-28 NOTE — PLAN OF CARE
Data: Patient presented to Birthplace: 2019  8:51 PM.  Reason for maternal/fetal assessment is uterine contractions. Patient reports her contractions have continued to increase in frequency and intensity since her discharge this AM.  States her contractions are now 5 minutes apart, rates 5/10 for pain.  Patient is a .  Prenatal record reviewed. Pregnancy has been uncomplicated..  Gestational Age 40w3d. VSS. Fetal movement present. Patient denies leaking of vaginal fluid/rupture of membranes, vaginal bleeding. Support person  Carlos is present.   Action: Verbal consent for EFM. Triage assessment completed. Bill of rights reviewed.  Response: Patient verbalized agreement with plan. E-paged Mariaelena Maldonado with patient's arrival - she is inhouse and will come now to evaluate patient.

## 2019-07-28 NOTE — L&D DELIVERY NOTE
Delivery Note: Patient resting in bed. Reports intermittent rectal pressure. Found to be complete and +2 station. Maternal pushing began with uterine contractions. Variable decelerations with contractions. O2 facemask placed on mother. Strip improved. NICU team called for delivery due to meconium stained fluid.  to viable male at 1427. Infant brought to maternal chest. Vigorous with spontaneous cry. Infant stayed with mother, NICU team left room. Apgar's 8 at one minute and 9 at five minutes. Weight pending. Cord clamped and cut by FATHER OF BABY after cessation of pulsation. Spontaneous deliver of intact placenta, 3 vessel cord. QBL 100cc. Pitocin bolus running. Fundus firm. Second degree perineal laceration repaired under epidural anesthesia and local lidocaine with 3.0 vicryl. Mother and infant stable and bonding. Mariaelena KING

## 2019-07-29 LAB — HGB BLD-MCNC: 9 G/DL (ref 11.7–15.7)

## 2019-07-29 PROCEDURE — 25000132 ZZH RX MED GY IP 250 OP 250 PS 637: Performed by: ADVANCED PRACTICE MIDWIFE

## 2019-07-29 PROCEDURE — 12000000 ZZH R&B MED SURG/OB

## 2019-07-29 PROCEDURE — 85018 HEMOGLOBIN: CPT | Performed by: ADVANCED PRACTICE MIDWIFE

## 2019-07-29 PROCEDURE — 36415 COLL VENOUS BLD VENIPUNCTURE: CPT | Performed by: ADVANCED PRACTICE MIDWIFE

## 2019-07-29 RX ORDER — FERROUS GLUCONATE 324(38)MG
324 TABLET ORAL
Status: DISCONTINUED | OUTPATIENT
Start: 2019-07-29 | End: 2019-07-30 | Stop reason: HOSPADM

## 2019-07-29 RX ADMIN — ACETAMINOPHEN 650 MG: 325 TABLET, FILM COATED ORAL at 03:59

## 2019-07-29 RX ADMIN — ACETAMINOPHEN 650 MG: 325 TABLET, FILM COATED ORAL at 08:36

## 2019-07-29 RX ADMIN — IBUPROFEN 800 MG: 800 TABLET ORAL at 05:53

## 2019-07-29 RX ADMIN — IBUPROFEN 800 MG: 800 TABLET ORAL at 12:24

## 2019-07-29 RX ADMIN — ACETAMINOPHEN 650 MG: 325 TABLET, FILM COATED ORAL at 14:55

## 2019-07-29 RX ADMIN — IBUPROFEN 800 MG: 800 TABLET ORAL at 18:25

## 2019-07-29 RX ADMIN — FERROUS GLUCONATE 324 MG: 324 TABLET ORAL at 09:31

## 2019-07-29 RX ADMIN — SENNOSIDES AND DOCUSATE SODIUM 1 TABLET: 8.6; 5 TABLET ORAL at 08:36

## 2019-07-29 RX ADMIN — SENNOSIDES AND DOCUSATE SODIUM 1 TABLET: 8.6; 5 TABLET ORAL at 18:25

## 2019-07-29 NOTE — PROGRESS NOTES
Charito Rodriguez CNM Progress Note: Postpartum Day #1    2019  8:57 AM    SUBJECTIVE:  Patient is stable and is tolerating acitivity well  Baby is rooming in  Complications since 2 hours post delivery: None  Pain is well controlled.  Patient is taking pain medications.  Breastfeeding status:initiated   Elimination:  She is voiding without difficulty.  She has not had a bowel movement  Desired contraception Unsure  Denies heavy bleeding and passing large clots.  Feels great about birth experience.    OBJECTIVE:  /76 (BP Location: Left arm)   Pulse 62   Temp 98.3  F (36.8  C) (Oral)   Resp 16   LMP 10/17/2018 (Exact Date)   Breastfeeding? Unknown     Constitutional: healthy, alert and no distress    Breasts: Currently breastfeeding      Perineum: Perineum is well approximated, minimal swelling    Lochia: Lochia is appropriate for the duration of time since delivery.     ASSESSMENT:  PPD #1    Doing well.  Hemoglobin   Date Value Ref Range Status   2019 9.0 (L) 11.7 - 15.7 g/dL Final   ]      PLAN:  Continue routine care  Iron supplementation  Breast feeding strategies discussed  Comfort measures for perineal swelling discussed, including witch hazel and ice packs  Reviewed breastfeeding  Reviewed postpartum warning signs  Plans unsure for contraception postpartum  Anticipated discharge tomorrow        Aura Young CNM

## 2019-07-29 NOTE — PROGRESS NOTES
Public Health Nurse (PHN) met with patient, discussed resources within [facility].  Provided family resources of [facility] Public Health services resource card, home visiting card, Follow along card, community resource guide and car seat information card given and discussed.  Family is aware how to add baby to insurance and have a primary provider arranged for baby.  Offered referral for home visiting, family declined. Patient declined any questions or concerns.

## 2019-07-29 NOTE — PLAN OF CARE
Pt up and showered today. Up ad demetrius and taking tyl/ibu for pain. Breastfeeding with shield. Bonding with baby.  present and supportive. Will monitor.

## 2019-07-29 NOTE — PLAN OF CARE
Pt is up ad demetrius, and reports adequate pain control. Family is present and supportive. Pt is attentive to infants needs. Breastfeeding with assistance, using nipple shield.Meeting expected goals.

## 2019-07-29 NOTE — PLAN OF CARE
Voiding without difficultty. Meeting expected ogals.  Instructed to fill out birth certificate and depression scale.

## 2019-07-29 NOTE — ANESTHESIA POSTPROCEDURE EVALUATION
Patient: Agustin Ortega    * No procedures listed *    Diagnosis:* No pre-op diagnosis entered *  Diagnosis Additional Information: labor    Anesthesia Type:  Epidural    Note:  Anesthesia Post Evaluation    Patient location during evaluation: PACU  Patient participation: Able to fully participate in evaluation  Level of consciousness: awake  Pain management: adequate  Airway patency: patent  Cardiovascular status: acceptable  Respiratory status: acceptable  Hydration status: acceptable  PONV: controlled     Anesthetic complications: None    Comments: .Labor Epidural Post delivery note.      Doing well. VSS Temp normal. Satisfactory respiratory and cardiovascular function. Return of neurologic function. Questions encouraged and answered. Denies positional headache. Minimal side effects easily managed w/ PRN meds. No apparent anesthetic complications. No follow-up required.    I or my partner was immediately available for epidural management.    SMILEY Antonio          Last vitals:  Vitals:    07/29/19 0030 07/29/19 0800   BP: 116/58 115/76   Pulse: 73 62   Resp: 16 16   Temp: 98.3  F (36.8  C) 98.3  F (36.8  C)         Electronically Signed By: Aj Antonio DO  July 29, 2019  8:54 AM

## 2019-07-29 NOTE — LACTATION NOTE
This note was copied from a baby's chart.  LC visit. Her baby has been nursing well with use of a nipple shield.  LC assisted with latch without the shield on the right.  Her right nipple everts further than the left side.  Baby was able to latch to both sides without the shield but nursed better with shield use than without it on the left.  LC will continue to follow and assist with positioning.  Good latches noted with swallows.  Excellent volume of colostrum noted with HE and also visualized within the shield.

## 2019-07-30 VITALS
DIASTOLIC BLOOD PRESSURE: 74 MMHG | SYSTOLIC BLOOD PRESSURE: 126 MMHG | RESPIRATION RATE: 16 BRPM | HEART RATE: 83 BPM | TEMPERATURE: 98.5 F

## 2019-07-30 PROCEDURE — 25000132 ZZH RX MED GY IP 250 OP 250 PS 637: Performed by: ADVANCED PRACTICE MIDWIFE

## 2019-07-30 RX ORDER — AMOXICILLIN 250 MG
1 CAPSULE ORAL 2 TIMES DAILY PRN
Qty: 60 TABLET | Refills: 1 | Status: SHIPPED | OUTPATIENT
Start: 2019-07-30 | End: 2021-02-08

## 2019-07-30 RX ORDER — IBUPROFEN 800 MG/1
800 TABLET, FILM COATED ORAL EVERY 6 HOURS PRN
Qty: 90 TABLET | Refills: 1 | Status: SHIPPED | OUTPATIENT
Start: 2019-07-30 | End: 2021-02-08

## 2019-07-30 RX ADMIN — FERROUS GLUCONATE 324 MG: 324 TABLET ORAL at 08:42

## 2019-07-30 RX ADMIN — IBUPROFEN 800 MG: 800 TABLET ORAL at 08:42

## 2019-07-30 RX ADMIN — SENNOSIDES AND DOCUSATE SODIUM 1 TABLET: 8.6; 5 TABLET ORAL at 08:41

## 2019-07-30 RX ADMIN — IBUPROFEN 800 MG: 800 TABLET ORAL at 00:45

## 2019-07-30 NOTE — PROGRESS NOTES
[facility]    Discharge Summary  Obstetrics    Date of Admission:  2019  Date of Discharge:  2019  Discharging Provider: Kathya Mcdaniels  Date of Service (when I saw the patient): 19    Discharge Diagnoses       History of Present Illness   Agustin Ortega is a 28 year old female who presented with spontaneous onset of labor that progressed to a  for viable male infant    Hospital Course   The patient's hospital course was unremarkable.  She recovered as anticipated and experienced no post-delivery complications.  On discharge, her pain was well controlled. Vaginal bleeding is similar to peak menstrual flow.  Voiding without difficulty.  Ambulating well and tolerating a normal diet.  No fevers.  Breastfeeding well.  Infant is stable.  She was discharged on post-partum day #2.    Post-partum hemoglobin:   Hemoglobin   Date Value Ref Range Status   2019 9.0 (L) 11.7 - 15.7 g/dL Final       Kathya Mcdaniels    Discharge Disposition   Discharged to home   Condition at discharge: Stable    Primary Care Physician   Kourtney Junior    Consultations This Hospital Stay   ANESTHESIOLOGY IP CONSULT  HOME CARE POST PARTUM/ IP CONSULT  LACTATION IP CONSULT    Discharge Orders      Activity    Review discharge instructions     Reason for your hospital stay    Maternity care     Follow Up and recommended labs and tests    none     Discharge Instructions - Postpartum visit    Schedule postpartum visit with your provider and return to clinic in 2 and 6 weeks.     Diet    Resume previous diet     Discharge Medications   Current Discharge Medication List      START taking these medications    Details   ibuprofen (ADVIL/MOTRIN) 800 MG tablet Take 1 tablet (800 mg) by mouth every 6 hours as needed for other (cramping)  Qty: 90 tablet, Refills: 1    Associated Diagnoses: Normal labor and delivery      senna-docusate (SENOKOT-S/PERICOLACE) 8.6-50 MG tablet Take 1 tablet by  mouth 2 times daily as needed for constipation  Qty: 60 tablet, Refills: 1    Associated Diagnoses: Normal labor and delivery         CONTINUE these medications which have NOT CHANGED    Details   Prenatal MV-Min-Fe Fum-FA-DHA (PRENATAL 1 PO)          STOP taking these medications       ferrous sulfate (FE TABS) 325 (65 Fe) MG EC tablet Comments:   Reason for Stopping:         hydrOXYzine (VISTARIL) 25 MG capsule Comments:   Reason for Stopping:         ondansetron (ZOFRAN-ODT) 4 MG ODT tab Comments:   Reason for Stopping:             Allergies   No Known Allergies      Kathya Mcdaniels CNM on 7/30/2019 at 10:07 AM

## 2019-07-30 NOTE — LACTATION NOTE
This note was copied from a baby's chart.  LC follow up.  Both parents feel confident with how infant has been latching.  Zuleima has not used the shield for several latches recently and feels that her baby is nursing well.  LC offered support prn and answered all questions.

## 2019-07-30 NOTE — DISCHARGE INSTRUCTIONS
Postpartum Vaginal Delivery Instructions    LACTATION [phone]  [facility] [phone]    Activity       Ask family and friends for help when you need it.    Do not place anything in your vagina for 6 weeks.    You are not restricted on other activities, but take it easy for a few weeks to allow your body to recover from delivery.  You are able to do any activities you feel up to that point.    No driving until you have stopped taking your pain medications (usually two weeks after delivery).     Call your health care provider if you have any of these symptoms:       Increased pain, swelling, redness, or fluid around your stiches from an episiotomy or perineal tear.    A fever above 100.4 F (38 C) with or without chills when placing a thermometer under your tongue.    You soak a sanitary pad with blood within 1 hour, or you see blood clots larger than a golf ball.    Bleeding that lasts more than 6 weeks.    Vaginal discharge that smells bad.    Severe pain, cramping or tenderness in your lower belly area.    A need to urinate more frequently (use the toilet more often), more urgently (use the toilet very quickly), or it burns when you urinate.    Nausea and vomiting.    Redness, swelling or pain around a vein in your leg.    Problems breastfeeding or a red or painful area on your breast.    Chest pain and cough or are gasping for air.    Problems coping with sadness, anxiety, or depression.  If you have any concerns about hurting yourself or the baby, call your provider immediately.     You have questions or concerns after you return home.     Keep your hands clean:  Always wash your hands before touching your perineal area and stitches.  This helps reduce your risk of infection.  If your hands aren't dirty, you may use an alcohol hand-rub to clean your hands. Keep your nails clean and short.

## 2019-07-30 NOTE — PLAN OF CARE
Pt meeting expected outcomes. Vitals stable. Fundus firm and midline. Denies difficulty voiding. Pain controlled with ibuprofen. Independent with self and baby cares. Breastfeeding is going well, has been able to latch baby independently overnight, at times using nipple shield. Anticipated discharge home today.

## 2019-07-30 NOTE — PLAN OF CARE
Pt up in room ad demetrius. Ibu for pain and sent home with filled rx of ibu and stool softener. Questions answered and education completed and discharge info gone over with pt and . Ready for discharge. Breastfeeding well.

## 2019-09-03 ENCOUNTER — PRENATAL OFFICE VISIT (OUTPATIENT)
Dept: OBGYN | Facility: CLINIC | Age: 29
End: 2019-09-03
Payer: COMMERCIAL

## 2019-09-03 VITALS — DIASTOLIC BLOOD PRESSURE: 66 MMHG | WEIGHT: 139.7 LBS | SYSTOLIC BLOOD PRESSURE: 98 MMHG | BODY MASS INDEX: 26.4 KG/M2

## 2019-09-03 PROCEDURE — 99207 ZZC POST PARTUM EXAM: CPT | Performed by: ADVANCED PRACTICE MIDWIFE

## 2019-09-03 NOTE — NURSING NOTE
"Chief Complaint   Patient presents with     Postpartum Care     6 week PP visit       Initial BP 98/66 (BP Location: Right arm, Patient Position: Chair, Cuff Size: Adult Regular)   Wt 63.4 kg (139 lb 11.2 oz)   LMP 10/17/2018 (Exact Date)   Breastfeeding? Yes   BMI 26.40 kg/m   Estimated body mass index is 26.4 kg/m  as calculated from the following:    Height as of 19: 1.549 m (5' 1\").    Weight as of this encounter: 63.4 kg (139 lb 11.2 oz).  BP completed using cuff size: regular    Questioned patient about current smoking habits.  Pt. has never smoked.          The following HM Due: NONE      Pt states that her arms started hurting towards end of pregnancy after sleeping, states that she is still having this issue.      Neetu Garza, CMA on 9/3/2019 at 3:31 PM  "

## 2019-09-03 NOTE — PROGRESS NOTES
Midwife Postpartum 6 Week Visit    Agustin Ortega is a 28 year old here for a postpartum checkup.     Delivery date was 2019. She had a  of a viable boy, weight 6 pounds 4 oz., with none complications      Since delivery, she has been breast feeding.  She has not had any signs of infection, her lochia stopped after still some bleeding weeks.  She has had other complications.  Arms hurt after sleeping     She is voiding and having bowel movements without difficulty.       Contraception was discussed and patient desires none.   She  has not had intercourse since delivery.   She complains of No  perineal discomfort.     Mood is Stable  Patient screened for postpartum depression.   Depression Rating was:   Last PHQ-9 score on record = No flowsheet data found.  Last GAD7 score on record = No flowsheet data found.    ROS:  CONSTITUTIONAL: NEGATIVE for fever, chills, change in weight  INTEGUMENTARU/SKIN: NEGATIVE for worrisome rashes, moles or lesions  EYES: NEGATIVE for vision changes or irritation  ENT: NEGATIVE for ear, mouth and throat problems  RESP: NEGATIVE for significant cough or SOB  BREAST: NEGATIVE for masses, tenderness or discharge  CV: NEGATIVE for chest pain, palpitations or peripheral edema  GI: NEGATIVE for nausea, abdominal pain, heartburn, or change in bowel habits  : NEGATIVE for unusual urinary or vaginal symptoms.  MUSCULOSKELETAL: NEGATIVE for significant arthralgias or myalgia  NEURO: NEGATIVE for weakness, dizziness or paresthesias  PSYCHIATRIC: NEGATIVE for changes in mood or affect      Current Outpatient Medications:      ibuprofen (ADVIL/MOTRIN) 800 MG tablet, Take 1 tablet (800 mg) by mouth every 6 hours as needed for other (cramping), Disp: 90 tablet, Rfl: 1     Prenatal MV-Min-Fe Fum-FA-DHA (PRENATAL 1 PO), , Disp: , Rfl:      senna-docusate (SENOKOT-S/PERICOLACE) 8.6-50 MG tablet, Take 1 tablet by mouth 2 times daily as needed for constipation, Disp: 60 tablet, Rfl: 1.   OB  History    Para Term  AB Living   1 1 1 0 0 1   SAB TAB Ectopic Multiple Live Births   0 0 0 0 1      # Outcome Date GA Lbr Tip/2nd Weight Sex Delivery Anes PTL Lv   1 Term 19 40w4d 04:00 / 02:27 2.835 kg (6 lb 4 oz) M Vag-Spont EPI, Local N NEHEMIAS      Name: ROVERTOMALE-ASHLEY      Apgar1: 8  Apgar5: 9     Last pap:    Lab Results   Component Value Date    PAP NIL 2018     Hgb in hospital was 9.0    EXAM:  LMP 10/17/2018 (Exact Date)   BMI: There is no height or weight on file to calculate BMI.  Exam:  Constitutional: healthy, alert and no distress  Head: Normocephalic. No masses, lesions, tenderness or abnormalities  Neck: Neck supple. No adenopathy. Thyroid symmetric, normal size,, Carotids without bruits.  ENT: ENT exam normal, no neck nodes or sinus tenderness  Cardiovascular: negative, PMI normal. No lifts, heaves, or thrills. RRR. No murmurs, clicks gallops or rub  Respiratory: negative, Percussion normal. Good diaphragmatic excursion. Lungs clear  Breasts: normal without suspicious masses, skin changes or axillary nodes, symmetric fibrous changes in both upper outer quadrants, self exam is taught and encouraged. Deferred as patient is lactating  Gastrointestinal: Abdomen soft, non-tender. BS normal. No masses, organomegaly    Musculoskeletal: extremities normal- no gross deformities noted, gait normal and normal muscle tone  Skin: no suspicious lesions or rashes  Neurologic: Gait normal. Reflexes normal and symmetric. Sensation grossly WNL.  Psychiatric: mentation appears normal and affect normal/bright  Hematologic/Lymphatic/Immunologic: Normal cervical lymph nodes  PELVIC EXAM:  Vulva: No lesions, well healed, BUS WNL, no tenderness  Vagina: Moist, pink, discharge normal  well rugated, no lesions  Cervix:smooth, pink, no visible lesions, one small stitch still intact at perineum, easily removed.   Uterus: Involuted to normal size, non-tender, no masses palpated  Ovaries: No masses  palpated  Pelvic tone: good  Rectal exam: deferred      ASSESSMENT:   Normal postpartum exam after .  (Z39.2) Postpartum care and examination  (primary encounter diagnosis)    PLAN:  Results for orders placed or performed during the hospital encounter of 19   Treponema Abs w Reflex to RPR and Titer   Result Value Ref Range    Treponema Antibodies Nonreactive NR^Nonreactive   CBC with platelets differential   Result Value Ref Range    WBC 5.9 4.0 - 11.0 10e9/L    RBC Count 3.82 3.8 - 5.2 10e12/L    Hemoglobin 11.1 (L) 11.7 - 15.7 g/dL    Hematocrit 33.4 (L) 35.0 - 47.0 %    MCV 87 78 - 100 fl    MCH 29.1 26.5 - 33.0 pg    MCHC 33.2 31.5 - 36.5 g/dL    RDW 13.6 10.0 - 15.0 %    Platelet Count 122 (L) 150 - 450 10e9/L    Diff Method Automated Method     % Neutrophils 75.8 %    % Lymphocytes 16.0 %    % Monocytes 7.4 %    % Eosinophils 0.3 %    % Basophils 0.2 %    % Immature Granulocytes 0.3 %    Nucleated RBCs 0 0 /100    Absolute Neutrophil 4.5 1.6 - 8.3 10e9/L    Absolute Lymphocytes 1.0 0.8 - 5.3 10e9/L    Absolute Monocytes 0.4 0.0 - 1.3 10e9/L    Absolute Eosinophils 0.0 0.0 - 0.7 10e9/L    Absolute Basophils 0.0 0.0 - 0.2 10e9/L    Abs Immature Granulocytes 0.0 0 - 0.4 10e9/L    Absolute Nucleated RBC 0.0    Hemoglobin   Result Value Ref Range    Hemoglobin 9.0 (L) 11.7 - 15.7 g/dL   ABO/Rh type and screen   Result Value Ref Range    ABO O     RH(D) Pos     Antibody Screen Neg     Test Valid Only At [facility]        Specimen Expires 2019        Return as needed or at time of next expected pap, pelvic, or breast exam.  Teaching: self breast exam, exercise, birth control, mental health and weight/diet  Family Planning:none  Encourage Kegels and abdominal exercise.  Continue a multivitamin/prenatal supplement, especially if breastfeeding.  Pap smear was not obtained today.  Postpartum Hgb was not done today.    GDM:  Fasting and 2hr GCT needed:  No  If yes, remind need for annual  fasting BG and nutrition/exercise recommendations.    Patient declines contraception. Will use condoms.     Mariaelena Maldonado CNM

## 2019-09-19 ENCOUNTER — OFFICE VISIT (OUTPATIENT)
Dept: URGENT CARE | Facility: URGENT CARE | Age: 29
End: 2019-09-19
Payer: COMMERCIAL

## 2019-09-19 VITALS
WEIGHT: 140 LBS | TEMPERATURE: 97.8 F | OXYGEN SATURATION: 99 % | DIASTOLIC BLOOD PRESSURE: 68 MMHG | HEART RATE: 72 BPM | SYSTOLIC BLOOD PRESSURE: 118 MMHG | BODY MASS INDEX: 26.45 KG/M2

## 2019-09-19 DIAGNOSIS — N61.0 ACUTE MASTITIS: Primary | ICD-10-CM

## 2019-09-19 DIAGNOSIS — M79.621 PAIN OF RIGHT UPPER ARM: ICD-10-CM

## 2019-09-19 DIAGNOSIS — M79.622 PAIN OF LEFT UPPER ARM: ICD-10-CM

## 2019-09-19 PROCEDURE — 99214 OFFICE O/P EST MOD 30 MIN: CPT | Performed by: FAMILY MEDICINE

## 2019-09-19 RX ORDER — CEPHALEXIN 500 MG/1
500 CAPSULE ORAL 3 TIMES DAILY
Qty: 21 CAPSULE | Refills: 0 | Status: SHIPPED | OUTPATIENT
Start: 2019-09-19 | End: 2019-09-26

## 2019-09-19 RX ORDER — IRON,CARBONYL/ASCORBIC ACID 100-250 MG
TABLET ORAL
COMMUNITY
End: 2021-02-08

## 2019-09-19 ASSESSMENT — ENCOUNTER SYMPTOMS
CARDIOVASCULAR NEGATIVE: 1
RESPIRATORY NEGATIVE: 1
CONSTITUTIONAL NEGATIVE: 1
ROS SKIN COMMENTS: AS ABOVE.
HEMATOLOGIC/LYMPHATIC NEGATIVE: 1

## 2019-09-20 NOTE — PROGRESS NOTES
SUBJECTIVE:   Agustin Ortega is a 28 year old female presenting with a chief complaint of   Chief Complaint   Patient presents with     Breast Pain     29 yo F presents with the following complaint pain in right breast, tried breast pumping, ibuprofen hot showers, massaging  no relief concerned about mastitis.        She is an established patient of [facility].    The patient complains of pain, redness and swelling in her left breast. The patient reports that this started yesterday. Symptoms are worsening. She nurses her 7 week old baby. She also complains of upper arm pain in the morning.    Review of Systems   Constitutional: Negative.    Respiratory: Negative.    Cardiovascular: Negative.    Skin:        As above.   Hematological: Negative.    Musculoskeletal: as above    History reviewed. No pertinent past medical history.  Family History   Problem Relation Age of Onset     Diabetes Father      Hypertension Father      Hyperlipidemia Father      Cerebrovascular Disease Father 60     Cancer Maternal Grandmother      Cancer Maternal Grandfather      Diabetes Brother 31        Type 2     Current Outpatient Medications   Medication Sig Dispense Refill     cholecalciferol (VITAMIN D3) 5000 units (125 mcg) capsule Take 5,000 Units by mouth daily       Iron-Vitamin C (IRON 100/C) 100-250 MG TABS        Prenatal MV-Min-Fe Fum-FA-DHA (PRENATAL 1 PO)        ibuprofen (ADVIL/MOTRIN) 800 MG tablet Take 1 tablet (800 mg) by mouth every 6 hours as needed for other (cramping) (Patient not taking: Reported on 9/3/2019) 90 tablet 1     senna-docusate (SENOKOT-S/PERICOLACE) 8.6-50 MG tablet Take 1 tablet by mouth 2 times daily as needed for constipation (Patient not taking: Reported on 9/3/2019) 60 tablet 1     Social History     Tobacco Use     Smoking status: Never Smoker     Smokeless tobacco: Never Used   Substance Use Topics     Alcohol use: No       OBJECTIVE  /68   Pulse 72   Temp 97.8  F (36.6  C) (Oral)   Wt  63.5 kg (140 lb)   LMP 10/17/2018 (Exact Date)   SpO2 99%   BMI 26.45 kg/m      Physical Exam   Constitutional: She appears well-developed.   Cardiovascular: Normal rate and normal heart sounds.   Pulmonary/Chest: Breath sounds normal.   Musculoskeletal:   There is no tenderness in the upper arms       Labs:  No results found for this or any previous visit (from the past 24 hour(s)).    X-Ray was not done.    ASSESSMENT:      ICD-10-CM    1. Acute mastitis N61.0    2. Pain of left upper arm M79.622    3. Pain of right upper arm M79.621         Medical Decision Making:      Serious Comorbid Conditions:  none    PLAN:    Cephalexin 500 mg tid for 7 days  Referral to sports medicine    Followup:    If not improving or if condition worsens, follow up with your Primary Care Provider    There are no Patient Instructions on file for this visit.

## 2019-09-26 ENCOUNTER — DOCUMENTATION ONLY (OUTPATIENT)
Dept: OBGYN | Facility: CLINIC | Age: 29
End: 2019-09-26

## 2019-09-26 NOTE — PROGRESS NOTES
[facility] will be sharing updates with you on Maternal Child Health Referral requests for home care services.  This is for care coordination purposes and alert you to referral status.  We received the referral for  gAustin Ortega; MRN [id number] and want to update you:    [facility] has made two attempts to contact patient by phone and text message over the last four days.  We have not had any response from patient.  Final message was left advising patient to follow up with Primary Care Providers for mom and baby.  Ordering MD and Primary Care Providers for mom and baby notified.      Sincerely [facility]  Kam Keen  [phone]

## 2020-03-02 ENCOUNTER — HEALTH MAINTENANCE LETTER (OUTPATIENT)
Age: 30
End: 2020-03-02

## 2020-07-18 ENCOUNTER — RESULTS ONLY (OUTPATIENT)
Dept: LAB | Age: 30
End: 2020-07-18

## 2020-07-18 ENCOUNTER — OFFICE VISIT (OUTPATIENT)
Dept: URGENT CARE | Facility: URGENT CARE | Age: 30
End: 2020-07-18
Payer: COMMERCIAL

## 2020-07-18 DIAGNOSIS — Z53.9 ERRONEOUS ENCOUNTER--DISREGARD: Primary | ICD-10-CM

## 2020-07-20 LAB
SARS-COV-2 RNA SPEC QL NAA+PROBE: NOT DETECTED
SPECIMEN SOURCE: NORMAL

## 2020-12-20 ENCOUNTER — HEALTH MAINTENANCE LETTER (OUTPATIENT)
Age: 30
End: 2020-12-20

## 2021-02-08 ENCOUNTER — OFFICE VISIT (OUTPATIENT)
Dept: INTERNAL MEDICINE | Facility: CLINIC | Age: 31
End: 2021-02-08
Payer: COMMERCIAL

## 2021-02-08 VITALS
SYSTOLIC BLOOD PRESSURE: 110 MMHG | HEIGHT: 61 IN | RESPIRATION RATE: 18 BRPM | DIASTOLIC BLOOD PRESSURE: 70 MMHG | WEIGHT: 160.4 LBS | HEART RATE: 93 BPM | TEMPERATURE: 97.7 F | OXYGEN SATURATION: 100 % | BODY MASS INDEX: 30.29 KG/M2

## 2021-02-08 DIAGNOSIS — R53.83 OTHER FATIGUE: ICD-10-CM

## 2021-02-08 DIAGNOSIS — D64.9 ANEMIA, UNSPECIFIED TYPE: ICD-10-CM

## 2021-02-08 DIAGNOSIS — E55.9 VITAMIN D DEFICIENCY: ICD-10-CM

## 2021-02-08 DIAGNOSIS — Z12.4 SCREENING FOR MALIGNANT NEOPLASM OF CERVIX: ICD-10-CM

## 2021-02-08 DIAGNOSIS — Z11.59 ENCOUNTER FOR HEPATITIS C SCREENING TEST FOR LOW RISK PATIENT: ICD-10-CM

## 2021-02-08 DIAGNOSIS — Z00.00 ROUTINE GENERAL MEDICAL EXAMINATION AT A HEALTH CARE FACILITY: Primary | ICD-10-CM

## 2021-02-08 DIAGNOSIS — R42 DIZZINESS: ICD-10-CM

## 2021-02-08 LAB
ERYTHROCYTE [DISTWIDTH] IN BLOOD BY AUTOMATED COUNT: 12.8 % (ref 10–15)
FOLATE SERPL-MCNC: 14.8 NG/ML
HCT VFR BLD AUTO: 38.9 % (ref 35–47)
HGB BLD-MCNC: 12.8 G/DL (ref 11.7–15.7)
MCH RBC QN AUTO: 28.5 PG (ref 26.5–33)
MCHC RBC AUTO-ENTMCNC: 32.9 G/DL (ref 31.5–36.5)
MCV RBC AUTO: 87 FL (ref 78–100)
PLATELET # BLD AUTO: 163 10E9/L (ref 150–450)
RBC # BLD AUTO: 4.49 10E12/L (ref 3.8–5.2)
RETICS # AUTO: 57.1 10E9/L (ref 25–95)
RETICS/RBC NFR AUTO: 1.3 % (ref 0.5–2)
VIT B12 SERPL-MCNC: 243 PG/ML (ref 193–986)
WBC # BLD AUTO: 3.8 10E9/L (ref 4–11)

## 2021-02-08 PROCEDURE — 80061 LIPID PANEL: CPT | Performed by: INTERNAL MEDICINE

## 2021-02-08 PROCEDURE — 99214 OFFICE O/P EST MOD 30 MIN: CPT | Mod: 25 | Performed by: INTERNAL MEDICINE

## 2021-02-08 PROCEDURE — G0145 SCR C/V CYTO,THINLAYER,RESCR: HCPCS | Performed by: INTERNAL MEDICINE

## 2021-02-08 PROCEDURE — 83540 ASSAY OF IRON: CPT | Performed by: INTERNAL MEDICINE

## 2021-02-08 PROCEDURE — 82306 VITAMIN D 25 HYDROXY: CPT | Performed by: INTERNAL MEDICINE

## 2021-02-08 PROCEDURE — 85045 AUTOMATED RETICULOCYTE COUNT: CPT | Performed by: INTERNAL MEDICINE

## 2021-02-08 PROCEDURE — 82746 ASSAY OF FOLIC ACID SERUM: CPT | Performed by: INTERNAL MEDICINE

## 2021-02-08 PROCEDURE — 83550 IRON BINDING TEST: CPT | Performed by: INTERNAL MEDICINE

## 2021-02-08 PROCEDURE — 86803 HEPATITIS C AB TEST: CPT | Performed by: INTERNAL MEDICINE

## 2021-02-08 PROCEDURE — 36415 COLL VENOUS BLD VENIPUNCTURE: CPT | Performed by: INTERNAL MEDICINE

## 2021-02-08 PROCEDURE — 85027 COMPLETE CBC AUTOMATED: CPT | Performed by: INTERNAL MEDICINE

## 2021-02-08 PROCEDURE — 82607 VITAMIN B-12: CPT | Performed by: INTERNAL MEDICINE

## 2021-02-08 PROCEDURE — 87624 HPV HI-RISK TYP POOLED RSLT: CPT | Performed by: INTERNAL MEDICINE

## 2021-02-08 PROCEDURE — 84443 ASSAY THYROID STIM HORMONE: CPT | Performed by: INTERNAL MEDICINE

## 2021-02-08 PROCEDURE — 99395 PREV VISIT EST AGE 18-39: CPT | Performed by: INTERNAL MEDICINE

## 2021-02-08 PROCEDURE — 82728 ASSAY OF FERRITIN: CPT | Performed by: INTERNAL MEDICINE

## 2021-02-08 PROCEDURE — 80053 COMPREHEN METABOLIC PANEL: CPT | Performed by: INTERNAL MEDICINE

## 2021-02-08 ASSESSMENT — MIFFLIN-ST. JEOR: SCORE: 1379.95

## 2021-02-08 NOTE — PATIENT INSTRUCTIONS
Plan:  1.  Labs today - [address]   2. If the labs are normal and your fatigue persists I will make referral to sleep center     Preventive Health Recommendations  Female Ages 26 - 39  Yearly exam:   See your health care provider every year in order to    Review health changes.     Discuss preventive care.      Review your medicines if you your doctor has prescribed any.    Until age 30: Get a Pap test every three years (more often if you have had an abnormal result).    After age 30: Talk to your doctor about whether you should have a Pap test every 3 years or have a Pap test with HPV screening every 5 years.   You do not need a Pap test if your uterus was removed (hysterectomy) and you have not had cancer.  You should be tested each year for STDs (sexually transmitted diseases), if you're at risk.   Talk to your provider about how often to have your cholesterol checked.  If you are at risk for diabetes, you should have a diabetes test (fasting glucose).  Shots: Get a flu shot each year. Get a tetanus shot every 10 years.   Nutrition:     Eat at least 5 servings of fruits and vegetables each day.    Eat whole-grain bread, whole-wheat pasta and brown rice instead of white grains and rice.    Get adequate Calcium and Vitamin D.     Lifestyle    Exercise at least 150 minutes a week (30 minutes a day, 5 days of the week). This will help you control your weight and prevent disease.    Limit alcohol to one drink per day.    No smoking.     Wear sunscreen to prevent skin cancer.    See your dentist every six months for an exam and cleaning.

## 2021-02-08 NOTE — PROGRESS NOTES
{PROVIDER CHARTING PREFERENCE:473817}    Gila Dewey is a 30 year old who presents to clinic today for the following health issues {ACCOMPANIED BY STATEMENT (Optional):450671}  Patient is being seen to establish care and have labs done.  HPI       {SUPERLIST (Optional):590719}  {additonal problems for provider to add (Optional):639422}    Review of Systems   {ROS COMP (Optional):719766}      Objective    There were no vitals taken for this visit.  There is no height or weight on file to calculate BMI.  Physical Exam   {Exam List (Optional):089265}    {Diagnostic Test Results (Optional):683102}    {AMBULATORY ATTESTATION (Optional):050191}

## 2021-02-08 NOTE — PROGRESS NOTES
Dr Polo's note    Patient's instructions / PLAN:                                                      ***  Plan:  1.  Labs today - [address]   2. If the labs are normal and your fatigue persists I will make referral to sleep center   3.  4.  5.  6.  Continue same meds, same doses for now  Continue the other meds, same doses for now.  Please make a lab appointment for fasting labs in { :365797}  Follow up a week after the labs      ASSESSMENT & PLAN:                                                      1. ***  2.  3.  4.  5.  6.    Chief Complaint:                                                        Annual exam  Follow up chronic medical problems    Few things to discuss    SUBJECTIVE:                                                    History of present illness     We reviewed the chronic medical problems as above.   I reviewed the recent tests results in Epic     Fatigue  -- extremely fatigue  -- after the breakfast she feels she needs to take a nap  -- x 4-5 months  -- she wakes up at night 2-3 times and falls asleep easily   -- she doesn't snore  -- no legs movements during sleep     Anemia  -- baby July 2019     Dizziness  -- mainly when sitting or lying in bed  -- 2-3 times a week   -- lasts seconds  -- no palpitations     ROS:   ***  See below  ***  General: Negative for fever, chills, major weight changes, pos for  fatigue  Skin: Negative for rashes, abnormal spots  Eyes: Negative for blurred or double vision  ENT/mouth: Negative for sinuses discomfort, earache, sore throat  Respiratory: Negative for cough, wheezes, chronic lung disease  Cardiovascular: Negative for rest or exertional chest pain, shortness of breath, palpitations, leg edema,   Gastrointestinal: Negative for vomiting, abdominal pain, heartburn, blood in stool, diarrhea, constipation  Genitourinary: Negative for urinary frequency, blood in urine, history of kidney stones  Female: Negative for abnormal vaginal bleeding, vaginal  discharge  Neuro: Negative for headaches, numbness, tingling, weakness in arms or legs, history of seizure, recent syncope  Psychiatry: Negative for depression, anxiety, suicidal thoughts  Endo: Negative for known thyroid disease, diabetes.  Hemato/Lymph: Negative for nodes, easy bleeding, history of DVT, blood transfusion  Musculoskeletal: Negative for joint swelling, back pain      PMHx: - reviewed  History reviewed. No pertinent past medical history.    PSHx: reviewed  Past Surgical History:   Procedure Laterality Date     ENT SURGERY          Soc Hx: No daily alcohol, no smoking  Social History     Socioeconomic History     Marital status:      Spouse name: Timi     Number of children: Not on file     Years of education: Not on file     Highest education level: Not on file   Occupational History     Occupation: [employer]   Social Needs     Financial resource strain: Not on file     Food insecurity     Worry: Not on file     Inability: Not on file     Transportation needs     Medical: Not on file     Non-medical: Not on file   Tobacco Use     Smoking status: Never Smoker     Smokeless tobacco: Never Used   Substance and Sexual Activity     Alcohol use: No     Drug use: No     Sexual activity: Yes     Partners: Male   Lifestyle     Physical activity     Days per week: Not on file     Minutes per session: Not on file     Stress: Not on file   Relationships     Social connections     Talks on phone: Not on file     Gets together: Not on file     Attends Jainism service: Not on file     Active member of club or organization: Not on file     Attends meetings of clubs or organizations: Not on file     Relationship status: Not on file     Intimate partner violence     Fear of current or ex partner: Not on file     Emotionally abused: Not on file     Physically abused: Not on file     Forced sexual activity: Not on file   Other Topics Concern     Parent/sibling w/ CABG, MI or angioplasty before 65F 55M? No   Social  "History Narrative     Not on file        Fam Hx: reviewed  Family History   Problem Relation Age of Onset     Diabetes Father      Hypertension Father      Hyperlipidemia Father      Cerebrovascular Disease Father 60     Cancer Maternal Grandmother      Cancer Maternal Grandfather      Diabetes Brother 31        Type 2         Screening: reviewed      All: reviewed    Meds: reviewed  Current Outpatient Medications   Medication Sig Dispense Refill     cholecalciferol (VITAMIN D3) 5000 units (125 mcg) capsule Take 5,000 Units by mouth daily       Prenatal MV-Min-Fe Fum-FA-DHA (PRENATAL 1 PO)          OBJECTIVE:                                                    Physical Exam :  Blood pressure 110/70, pulse 93, temperature 97.7  F (36.5  C), temperature source Oral, resp. rate 18, height 1.549 m (5' 1\"), weight 72.8 kg (160 lb 6.4 oz), SpO2 100 %, currently breastfeeding.   ***  NAD, appears comfortable  Skin clear, no rashes  HEENT: PERRLA, EOMI, anicteric sclera, pink conjunctiva, external ears appear normal, bilateral tympanic membranes clinically normal, oropharynx normal color. ***  Neck: supple, no JVD,  no thyroidmegaly  Lymph nodes non palpable in the cervical, supraclavicular axillaries,   Chest: clear to auscultation with good respiratory effort  Cardiac: S1S2, RRR, no mgr appreciated  Abdomen: soft, not tender, not distended, audible bowel sound, no hepatosplenomegaly, no palpable masses, no abdominal bruits  Extremities: no cyanosis, clubbing or edema.   Neuro: A, Ox3, no focal signs.  Breast exam {BREAST EXAM:993910}    Pelvic exam: { :760181} ***        Kourtney Polo MD  Internal Medicine          SUBJECTIVE:   CC: Agustin Ortega is an 30 year old woman who presents for preventive health visit.     {Split Bill scripting  The purpose of this visit is to discuss your medical history and prevent health problems before you are sick. You may be responsible for a co-pay, coinsurance, or deductible if your " "visit today includes services such as checking on a sore throat, having an x-ray or lab test, or treating and evaluating a new or existing condition :109797}  Patient has been advised of split billing requirements and indicates understanding: {Yes and No:599145}  HPI  {Add if <65 person on Medicare  - Required Questions (Optional):049765}  {Outside tests to abstract? :212018}    {additional problems to add (Optional):246424}    Today's PHQ-2 Score:   PHQ-2 ( 1999 Pfizer) 4/2/2018   Q1: Little interest or pleasure in doing things 0   Q2: Feeling down, depressed or hopeless 0   PHQ-2 Score 0       Abuse: Current or Past (Physical, Sexual or Emotional) - { :026955}  Do you feel safe in your environment? { :815448}        Social History     Tobacco Use     Smoking status: Never Smoker     Smokeless tobacco: Never Used   Substance Use Topics     Alcohol use: No     {Rooming Staff- Complete this question if Prescreen response is not shown below for today's visit. If you drink alcohol do you typically have >3 drinks per day or >7 drinks per week? (Optional):123756}    Alcohol Use 10/22/2014   Prescreen: >3 drinks/day or >7 drinks/week? The patient does not drink >3 drinks per day nor >7 drinks per week.   {add AUDIT responses (Optional) (A score of 7 for adult men is an indication of hazardous drinking; a score of 8 or more is an indication of an alcohol use disorder.  A score of 7 or more for adult women is an indication of hazardous drinking or an alchohol use disorder):472119}    Any new diagnosis of family breast, ovarian, or bowel cancer? {If yes - repeat FHS-7 if not done today :005341::\"Yes\"} {Link to FHS-7 Assessment :380058}    Reviewed orders with patient.  Reviewed health maintenance and updated orders accordingly - { :309473::\"Yes\"}  {Chronicprobdata (optional):622290}    Breast CA Risk Screening:  No flowsheet data found.  {Pull in link for FHS-7 answers if completed after opening note (Optional) :632004}  {If " "any of the questions to the BCRA (FHS-7) are answered yes, consider ordering referral for genetic counseling (Optional) :463019::\"click delete button to remove this line now\"}  {AMB Mammogram Decision Support :632354}  Pertinent mammograms are reviewed under the imaging tab.    History of abnormal Pap smear: { :108932}  PAP / HPV 4/2/2018 10/22/2014   PAP NIL NIL     Reviewed and updated as needed this visit by clinical staff  Tobacco  Allergies  Meds   Med Hx  Surg Hx  Fam Hx  Soc Hx        Reviewed and updated as needed this visit by Provider                {HISTORY OPTIONS (Optional):097518}    Review of Systems  {FEMALE ROS (Optional):605173}     OBJECTIVE:   /70   Pulse 93   Temp 97.7  F (36.5  C) (Oral)   Resp 18   Ht 1.549 m (5' 1\")   Wt 72.8 kg (160 lb 6.4 oz)   SpO2 100%   BMI 30.31 kg/m    Physical Exam  {Exam Choices (Optional):077660}    {Diagnostic Test Results (Optional):598163::\"Diagnostic Test Results:\",\"Labs reviewed in Epic\"}    ASSESSMENT/PLAN:   {Diag Picklist:955539}    Patient has been advised of split billing requirements and indicates understanding: {YES / NO:618422::\"Yes\"}  COUNSELING:  {FEMALE COUNSELING MESSAGES:132669::\"Reviewed preventive health counseling, as reflected in patient instructions\"}    Estimated body mass index is 30.31 kg/m  as calculated from the following:    Height as of this encounter: 1.549 m (5' 1\").    Weight as of this encounter: 72.8 kg (160 lb 6.4 oz).    {Weight Management Plan (ACO) Complete if BMI is abnormal-  Ages 18-64  BMI >24.9.  Age 65+ with BMI <23 or >30 (Optional):372691}    She reports that she has never smoked. She has never used smokeless tobacco.      Counseling Resources:  ATP IV Guidelines  Pooled Cohorts Equation Calculator  Breast Cancer Risk Calculator  BRCA-Related Cancer Risk Assessment: FHS-7 Tool  FRAX Risk Assessment  ICSI Preventive Guidelines  Dietary Guidelines for Americans, 2010  USDA's MyPlate  ASA " Prophylaxis  Lung CA Screening    Kourtney Junior MD  [facility]

## 2021-02-08 NOTE — PROGRESS NOTES
Dr Polo's note    Patient's instructions / PLAN:                                                        Plan:  1.  Labs today - [address]   2. If the labs are normal and your fatigue persists I will make referral to sleep center         ASSESSMENT & PLAN:                                                      (Z00.00) Routine general medical examination at a health care facility  (primary encounter diagnosis)  Comment:   Plan: Comprehensive metabolic panel, CBC with         platelets, Lipid panel reflex to direct LDL         Fasting, TSH with free T4 reflex, Folate, Iron         and iron binding capacity, Reticulocyte count,         Vitamin B12, Ferritin            (R53.83) Other fatigue  Comment:   Plan: Comprehensive metabolic panel, CBC with         platelets, Lipid panel reflex to direct LDL         Fasting, TSH with free T4 reflex, Folate, Iron         and iron binding capacity, Reticulocyte count,         Vitamin B12, Ferritin            (D64.9) Anemia, unspecified type  Comment:   Plan: Comprehensive metabolic panel, CBC with         platelets, Lipid panel reflex to direct LDL         Fasting, TSH with free T4 reflex, Folate, Iron         and iron binding capacity, Reticulocyte count,         Vitamin B12, Ferritin            (R42) Dizziness  Comment:   Plan:     (Z11.59) Encounter for hepatitis C screening test for low risk patient  Comment:   Plan: Hepatitis C Screen Reflex to HCV RNA Quant and         Genotype            (Z12.4) Screening for malignant neoplasm of cervix  Comment:   Plan: Pap imaged thin layer screen with HPV -         recommended age 30 - 65 years (select HPV order        below)            (E55.9) Vitamin D deficiency  Comment:   Plan: Vitamin D Deficiency               Chief Complaint:                                                        Annual exam  Follow up chronic medical problems    Few things to discuss    SUBJECTIVE:                                                    History of  present illness     We reviewed the chronic medical problems as above.   I reviewed the recent tests results in Epic     Fatigue  -- extremely fatigue  -- after the breakfast she feels she needs to take a nap  -- x 4-5 months  -- she wakes up at night 2-3 times and falls asleep easily   -- she doesn't snore  -- no legs movements during sleep     Anemia  -- baby July 2019     Dizziness  -- mainly when sitting or lying in bed  -- 2-3 times a week   -- lasts seconds  -- no palpitations         ROS:     See below    General: Negative for fever, chills, major weight changes, positive for fatigue  Skin: Negative for rashes, abnormal spots  Eyes: Negative for blurred or double vision  ENT/mouth: Negative for sinuses discomfort, earache, sore throat  Respiratory: Negative for cough, wheezes, chronic lung disease  Cardiovascular: Negative for rest or exertional chest pain, shortness of breath, palpitations, leg edema,   Gastrointestinal: Negative for vomiting, abdominal pain, heartburn, blood in stool, diarrhea, constipation  Genitourinary: Negative for urinary frequency, blood in urine, history of kidney stones  Female: Negative for abnormal vaginal bleeding, vaginal discharge  Neuro: Negative for headaches, numbness, tingling, weakness in arms or legs, history of seizure, recent syncope positive for dizziness, as above  Psychiatry: Negative for depression, anxiety, suicidal thoughts  Endo: Negative for known thyroid disease, diabetes.  Hemato/Lymph: Negative for nodes, easy bleeding, history of DVT, blood transfusion positive for anemia  Musculoskeletal: Negative for joint swelling, back pain      PMHx: - reviewed  History reviewed. No pertinent past medical history.    PSHx: reviewed  Past Surgical History:   Procedure Laterality Date     ENT SURGERY          Soc Hx: No daily alcohol, no smoking  Social History     Socioeconomic History     Marital status:      Spouse name: Timi     Number of children: Not on  file     Years of education: Not on file     Highest education level: Not on file   Occupational History     Occupation: CMA   Social Needs     Financial resource strain: Not on file     Food insecurity     Worry: Not on file     Inability: Not on file     Transportation needs     Medical: Not on file     Non-medical: Not on file   Tobacco Use     Smoking status: Never Smoker     Smokeless tobacco: Never Used   Substance and Sexual Activity     Alcohol use: No     Drug use: No     Sexual activity: Yes     Partners: Male   Lifestyle     Physical activity     Days per week: Not on file     Minutes per session: Not on file     Stress: Not on file   Relationships     Social connections     Talks on phone: Not on file     Gets together: Not on file     Attends Worship service: Not on file     Active member of club or organization: Not on file     Attends meetings of clubs or organizations: Not on file     Relationship status: Not on file     Intimate partner violence     Fear of current or ex partner: Not on file     Emotionally abused: Not on file     Physically abused: Not on file     Forced sexual activity: Not on file   Other Topics Concern     Parent/sibling w/ CABG, MI or angioplasty before 65F 55M? No   Social History Narrative     Not on file        Fam Hx: reviewed  Family History   Problem Relation Age of Onset     Diabetes Father      Hypertension Father      Hyperlipidemia Father      Cerebrovascular Disease Father 60     Cancer Maternal Grandmother      Cancer Maternal Grandfather      Diabetes Brother 31        Type 2         Screening: reviewed      All: reviewed    Meds: reviewed  Current Outpatient Medications   Medication Sig Dispense Refill     cholecalciferol (VITAMIN D3) 5000 units (125 mcg) capsule Take 5,000 Units by mouth daily       Prenatal MV-Min-Fe Fum-FA-DHA (PRENATAL 1 PO)          OBJECTIVE:                                                    Physical Exam :  Blood pressure 110/70, pulse  "93, temperature 97.7  F (36.5  C), temperature source Oral, resp. rate 18, height 1.549 m (5' 1\"), weight 72.8 kg (160 lb 6.4 oz), SpO2 100 %, currently breastfeeding.     NAD, appears comfortable  Skin clear, no rashes  Neck: supple, no JVD,  no thyroidmegaly  Lymph nodes non palpable in the cervical, supraclavicular axillaries,   Chest: clear to auscultation with good respiratory effort  Cardiac: S1S2, RRR, no mgr appreciated  Abdomen: soft, not tender, not distended, audible bowel sound, no hepatosplenomegaly, no palpable masses, no abdominal bruits  Extremities: no cyanosis, clubbing or edema.   Neuro: A, Ox3, no focal signs.  Breast exam in supine and erect position: they are symmetrical, no skin changes, no tenderness or nodes on palpation. Nipples are erect, no skin lesions, no discharge on pressure.    Pelvic exam: deferred, no symptoms, no hx of abnormal pap         Kourtney Polo MD  Internal Medicine        SUBJECTIVE:   CC: Agustin Ortega is an 30 year old woman who presents for preventive health visit.       Patient has been advised of split billing requirements and indicates understanding: Yes  Healthy Habits:    Do you get at least three servings of calcium containing foods daily (dairy, green leafy vegetables, etc.)? yes    Amount of exercise or daily activities, outside of work: none    Problems taking medications regularly No    Medication side effects: No    Have you had an eye exam in the past two years? no    Do you see a dentist twice per year? yes    Do you have sleep apnea, excessive snoring or daytime drowsiness?no          Today's PHQ-2 Score:   PHQ-2 ( 1999 Pfizer) 4/2/2018 11/30/2017   Q1: Little interest or pleasure in doing things 0 0   Q2: Feeling down, depressed or hopeless 0 0   PHQ-2 Score 0 0       Abuse: Current or Past(Physical, Sexual or Emotional)- No  Do you feel safe in your environment? Yes        Social History     Tobacco Use     Smoking status: Never Smoker     " "Smokeless tobacco: Never Used   Substance Use Topics     Alcohol use: No     If you drink alcohol do you typically have >3 drinks per day or >7 drinks per week?                      Reviewed orders with patient.  Reviewed health maintenance and updated orders accordingly - Yes  Labs reviewed in [facility]    Breast CA Risk Screening:  No flowsheet data found.        Pertinent mammograms are reviewed under the imaging tab.    Pertinent mammograms are reviewed under the imaging tab.  History of abnormal Pap smear:   PAP / HPV 4/2/2018 10/22/2014   PAP NIL NIL     Reviewed and updated as needed this visit by clinical staff  Tobacco  Allergies  Meds   Med Hx  Surg Hx  Fam Hx  Soc Hx        Reviewed and updated as needed this visit by Provider                        Patient has been advised of split billing requirements and indicates understanding: Yes  COUNSELING:   Reviewed preventive health counseling, as reflected in patient instructions       Regular exercise       Healthy diet/nutrition    Estimated body mass index is 30.31 kg/m  as calculated from the following:    Height as of this encounter: 1.549 m (5' 1\").    Weight as of this encounter: 72.8 kg (160 lb 6.4 oz).        She reports that she has never smoked. She has never used smokeless tobacco.      Counseling Resources:  ATP IV Guidelines  Pooled Cohorts Equation Calculator  Breast Cancer Risk Calculator  BRCA-Related Cancer Risk Assessment: FHS-7 Tool  FRAX Risk Assessment  ICSI Preventive Guidelines  Dietary Guidelines for Americans, 2010  USDA's MyPlate  ASA Prophylaxis  Lung CA Screening    Kourtney Junior MD  [facility]  "

## 2021-02-09 LAB
ALBUMIN SERPL-MCNC: 3.8 G/DL (ref 3.4–5)
ALP SERPL-CCNC: 63 U/L (ref 40–150)
ALT SERPL W P-5'-P-CCNC: 16 U/L (ref 0–50)
ANION GAP SERPL CALCULATED.3IONS-SCNC: 4 MMOL/L (ref 3–14)
AST SERPL W P-5'-P-CCNC: 14 U/L (ref 0–45)
BILIRUB SERPL-MCNC: 0.3 MG/DL (ref 0.2–1.3)
BUN SERPL-MCNC: 13 MG/DL (ref 7–30)
CALCIUM SERPL-MCNC: 8.8 MG/DL (ref 8.5–10.1)
CHLORIDE SERPL-SCNC: 108 MMOL/L (ref 94–109)
CHOLEST SERPL-MCNC: 159 MG/DL
CO2 SERPL-SCNC: 26 MMOL/L (ref 20–32)
CREAT SERPL-MCNC: 0.52 MG/DL (ref 0.52–1.04)
DEPRECATED CALCIDIOL+CALCIFEROL SERPL-MC: 26 UG/L (ref 20–75)
FERRITIN SERPL-MCNC: 77 NG/ML (ref 12–150)
GFR SERPL CREATININE-BSD FRML MDRD: >90 ML/MIN/{1.73_M2}
GLUCOSE SERPL-MCNC: 81 MG/DL (ref 70–99)
HCV AB SERPL QL IA: NONREACTIVE
HDLC SERPL-MCNC: 60 MG/DL
IRON SATN MFR SERPL: 21 % (ref 15–46)
IRON SERPL-MCNC: 55 UG/DL (ref 35–180)
LDLC SERPL CALC-MCNC: 88 MG/DL
NONHDLC SERPL-MCNC: 99 MG/DL
POTASSIUM SERPL-SCNC: 3.7 MMOL/L (ref 3.4–5.3)
PROT SERPL-MCNC: 7.3 G/DL (ref 6.8–8.8)
SODIUM SERPL-SCNC: 138 MMOL/L (ref 133–144)
TIBC SERPL-MCNC: 271 UG/DL (ref 240–430)
TRIGL SERPL-MCNC: 53 MG/DL
TSH SERPL DL<=0.005 MIU/L-ACNC: 1.13 MU/L (ref 0.4–4)

## 2021-02-10 LAB
COPATH REPORT: NORMAL
PAP: NORMAL

## 2021-02-12 ENCOUNTER — MYC MEDICAL ADVICE (OUTPATIENT)
Dept: INTERNAL MEDICINE | Facility: CLINIC | Age: 31
End: 2021-02-12

## 2021-02-12 LAB
FINAL DIAGNOSIS: NORMAL
HPV HR 12 DNA CVX QL NAA+PROBE: NEGATIVE
HPV16 DNA SPEC QL NAA+PROBE: NEGATIVE
HPV18 DNA SPEC QL NAA+PROBE: NEGATIVE
SPECIMEN DESCRIPTION: NORMAL
SPECIMEN SOURCE CVX/VAG CYTO: NORMAL

## 2021-07-09 ENCOUNTER — OFFICE VISIT (OUTPATIENT)
Dept: URGENT CARE | Facility: URGENT CARE | Age: 31
End: 2021-07-09
Payer: COMMERCIAL

## 2021-07-09 VITALS
DIASTOLIC BLOOD PRESSURE: 88 MMHG | OXYGEN SATURATION: 99 % | HEART RATE: 75 BPM | TEMPERATURE: 97.9 F | RESPIRATION RATE: 17 BRPM | SYSTOLIC BLOOD PRESSURE: 122 MMHG

## 2021-07-09 DIAGNOSIS — M54.50 ACUTE MIDLINE LOW BACK PAIN WITHOUT SCIATICA: Primary | ICD-10-CM

## 2021-07-09 PROCEDURE — 99213 OFFICE O/P EST LOW 20 MIN: CPT | Performed by: FAMILY MEDICINE

## 2021-07-09 RX ORDER — CYCLOBENZAPRINE HCL 10 MG
10 TABLET ORAL 3 TIMES DAILY PRN
Qty: 15 TABLET | Refills: 0 | Status: SHIPPED | OUTPATIENT
Start: 2021-07-09 | End: 2021-07-24

## 2021-07-09 NOTE — PROGRESS NOTES
{PROVIDER CHARTING PREFERENCE:122445}    Gila Dewey is a 30 year old who presents for the following health issues     HPI           Review of Systems   Constitutional, HEENT, cardiovascular, pulmonary, GI, , musculoskeletal, neuro, skin, endocrine and psych systems are negative, except as otherwise noted.      Objective    /88   Pulse 75   Temp 97.9  F (36.6  C)   Resp 17   SpO2 99%   There is no height or weight on file to calculate BMI.  Physical Exam   {Exam List (Optional):[id number]}

## 2021-07-09 NOTE — PROGRESS NOTES
Assessment & Plan     Acute midline low back pain without sciatica    - cyclobenzaprine (FLEXERIL) 10 MG tablet; Take 1 tablet (10 mg) by mouth 3 times daily as needed for muscle spasms  - PHYSICAL THERAPY REFERRAL; Future    Pt agrees with plan to continue with heat packs and ibuprofen as well as Flexeril as needed at night for back spasm.   Consult placed for Physical therapy for core strengthening and to prevent recurrence.  Follow-up if no change or worsening symptoms prn.    Patient seen and evaluated with FNP student, Nisa Penn, BONY..     20 minutes spent on the date of the encounter doing chart review, patient visit and documentation     Rhonda Fabian MD  [facility] PEARL Dewey is a 30 year old who presents for the following health issues mid to low back pain.     HPI   Back pain past few days midline in the thoracic to lumber ache, does radiate down to lower back ut no radicular sx.  No recent injury or trauma.    Heat, ice and ibuprofen has helped with pain.     Began working out at gym 2 to 3 weeks ago but not in recent days.     Two year old at home.   Otherwise healthy.     Review of Systems   Constitutional, HEENT, cardiovascular, pulmonary, GI, , musculoskeletal, neuro, skin, endocrine and psych systems are negative, except as otherwise noted.      Objective    /88   Pulse 75   Temp 97.9  F (36.6  C)   Resp 17   SpO2 99%   There is no height or weight on file to calculate BMI.  Physical Exam   GENERAL: healthy, alert and no distress  MS: no gross musculoskeletal defects noted, no edema,

## 2021-09-01 ENCOUNTER — OFFICE VISIT (OUTPATIENT)
Dept: URGENT CARE | Facility: URGENT CARE | Age: 31
End: 2021-09-01
Payer: COMMERCIAL

## 2021-09-01 VITALS
TEMPERATURE: 98 F | SYSTOLIC BLOOD PRESSURE: 122 MMHG | RESPIRATION RATE: 20 BRPM | OXYGEN SATURATION: 98 % | HEART RATE: 78 BPM | DIASTOLIC BLOOD PRESSURE: 75 MMHG

## 2021-09-01 DIAGNOSIS — R11.2 NAUSEA AND VOMITING, INTRACTABILITY OF VOMITING NOT SPECIFIED, UNSPECIFIED VOMITING TYPE: Primary | ICD-10-CM

## 2021-09-01 DIAGNOSIS — R07.0 THROAT PAIN: ICD-10-CM

## 2021-09-01 PROCEDURE — U0005 INFEC AGEN DETEC AMPLI PROBE: HCPCS | Performed by: FAMILY MEDICINE

## 2021-09-01 PROCEDURE — U0003 INFECTIOUS AGENT DETECTION BY NUCLEIC ACID (DNA OR RNA); SEVERE ACUTE RESPIRATORY SYNDROME CORONAVIRUS 2 (SARS-COV-2) (CORONAVIRUS DISEASE [COVID-19]), AMPLIFIED PROBE TECHNIQUE, MAKING USE OF HIGH THROUGHPUT TECHNOLOGIES AS DESCRIBED BY CMS-2020-01-R: HCPCS | Performed by: FAMILY MEDICINE

## 2021-09-01 PROCEDURE — 99214 OFFICE O/P EST MOD 30 MIN: CPT | Performed by: FAMILY MEDICINE

## 2021-09-01 RX ORDER — PROMETHAZINE HYDROCHLORIDE 25 MG/1
25 TABLET ORAL EVERY 8 HOURS PRN
Qty: 6 TABLET | Refills: 0 | Status: SHIPPED | OUTPATIENT
Start: 2021-09-01 | End: 2021-09-03

## 2021-09-01 NOTE — LETTER
[facility] [place]  [address]  [address]  [place] [address]  [phone]      September 1, 2021    RE:  Agustin Ortega                                                                                                                                                       To whom it may concern:    Agustin Ortega was seen today in our urgent care clinic.  She is cleared to return to work once her COVID test results have come back negative.    Sincerely,        Sarai Caraballo MD    [facility]

## 2021-09-01 NOTE — PATIENT INSTRUCTIONS
Isolate until COVID test results known to be negative.    Drink lots of fluids as you have been doing.    Phenergan 25 mg up to every 8 hrs as needed for nausea and vomiting.  This medicine can also help stop a migraine type headache.

## 2021-09-01 NOTE — PROGRESS NOTES
ASSESSMENT:    ICD-10-CM    1. Nausea and vomiting, intractability of vomiting not specified, unspecified vomiting type  R11.2 promethazine (PHENERGAN) 25 MG tablet   2. Throat pain  R07.0 Symptomatic COVID-19 Virus (Coronavirus) by PCR Nasopharyngeal     CANCELED: Streptococcus A Rapid Screen w/Reflex to PCR - Clinic Collect     Suspect migraine headache as cause for nausea and vomiting along with headache and photosensitivity.  No exam findings to suggest severe intra-cranial pathology as the cause for this headache.    COVID test pending.    PLAN:  Phenergan 25 mg PO TID PRN to help with N/V as well as possible underlying migraine.  Pt declines injection of Toradol tonight.  If the headache does not improve with Phenergan and continued OTC analgesics, she plans to return to  for Toradol to help with the pain.    Isolate until COVID test results known to be negative.    SUBJECTIVE:  Agustin Ortega is a 30 year [facility].  Headache has been there for about a week.  Has had light sensitivity.  Had vomited a few times.  Has been managing with Tylenol and ibuprofen, but she prefers to avoid medications if at all possible.  No focal neuro symptoms.  Appetite is OK.  Has been trying to drink extra fluids.    Brother currently isolating with COVID-like symptoms.  She has been vaccinated for COVID.    OBJECTIVE:  /75   Pulse 78   Temp 98  F (36.7  C)   Resp 20   SpO2 98%   GEN:  Well-appearing, in NAD  PERRL.  Neuro grossly intact.  Oral MMM, normal pharynx.  Lungs clear.  CV exam normal.

## 2021-09-02 LAB — SARS-COV-2 RNA RESP QL NAA+PROBE: NEGATIVE

## 2021-09-27 NOTE — PLAN OF CARE
Assessment & Plan     1. Rash  Most consistent with eczema  - tacrolimus (PROTOPIC) 0.1 % external ointment; Apply topically 2 times daily  Dispense: 60 g; Refill: 0    2. Anxiety   Advised patient to limit scratching and rash should improve.  Apply moisturizer and use protopic to help as well.  Reassured patient as she is very anxious given her cervical spinal stenosis.    Follow up if not improving over next 2 weeks.     31 minutes spent on the date of the encounter doing chart review, patient visit, documentation and discussion with family         No follow-ups on file.    Ayush Black MD  [facility]    Subjective     Tonya Yang is a 78 year old year old female who presents to clinic today for the following health issues:    Patient presents with:  Urgent Care  Allergic Reaction: Reports was given dilaudid and valium Tuesday; started to have generalized extremely itchy rash Thursday.  Stopped dilaudid 2 days ago and stopped valium after AM dose.    This is a 79 yo female with an extensive PMH including cervical stenosis and anxiety who presents with itching and rash on arms.  Concerned she may have an allergic reaction.  Itching preceded the rash.  Otherwise symptoms are unchanged.    Patient Active Problem List   Diagnosis     Postmenopausal atrophic vaginitis     IBS (irritable bowel syndrome)     Meniere's disease (cochlear hydrops) - on prednisone and Diazide.     GERD (gastroesophageal reflux disease)     CKD (chronic kidney disease) stage 3, GFR 30-59 ml/min     Health Care Home     Iron deficiency anemia due to chronic blood loss     Deafness     Abscess,& FB in liver in 4-10 & 4-11 w recurrent pain in RUQ  in 7-13 s/po lap I&D and unroofing sans finding of an FB  in 11-13      Elevated liver enzymes since 12-13 liver abscess I&D     Temporomandibular joint disorder     Bipolar disorder, in full remission, most recent episode depressed (H)     Diarrhea r/o  Pt stable and meeting expected goals. VSS. Up ad demetrius and independent with cares. Pain managed with tylenol and ibuprofen. Breastfeeding is going fairly well; using a nipple shield. Still needs help with positioning but is usually able to get baby on the other side. Bonding with infant. Continue to monitor.    exacerbated by metformin -since 30's     DVT (deep venous thrombosis), left     Baker's cyst, left     Lower urinary tract infectious disease     Left-sided low back pain with left-sided sciatica since 1993 w reoccurrence 6-15      Meniere's disease (cochlear hydrops), LT     Steroid dependent for cochlear hydrops  since 1985      Primary insomnia     Mixed hyperlipidemia     Localized edema-L>R lat malleolus     Mucopurulent chronic bronchitis (HCC): spirometry 7-18-13  FEV=79; FEV_FVC=65%     Microalbuminuria     Leukocytosis w Lt shift      Long-term (current) use of anticoagulants [Z79.01]     Benign essential hypertension     Ankle edema     Sensorineural hearing loss, bilateral     Chronic pain syndrome-issue of broken controlled sub agreement      Bilateral leg edema worsening w her increasing inactivity      Muscle weakness (generalized) worse since 11-16 w drowsiness     Type 2 diabetes mellitus with diabetic nephropathy, without long-term current use of insulin (H)     Class 2 obesity due to excess calories with serious comorbidity and body mass index (BMI) of 37.0 to 37.9 in adult     Bilateral deafness     Acute deep vein thrombosis (DVT) of left lower extremity, unspecified vein (H) 2013 w recurrence      Confusion     Hyperglycemia     Acute renal failure, unspecified acute renal failure type (H)       Current Outpatient Medications   Medication     amLODIPine (NORVASC) 10 MG tablet     ammonium lactate (LAC-HYDRIN) 12 % external lotion     atorvastatin (LIPITOR) 10 MG tablet     cyanocobalamin 1000 MCG SUBL     DULoxetine (CYMBALTA) 60 MG EC capsule     glipiZIDE (GLUCOTROL) 10 MG tablet     hydrOXYzine (ATARAX) 25 MG tablet     JARDIANCE 25 MG TABS tablet     lamoTRIgine (LAMICTAL) 100 MG tablet     Lidocaine (LIDOCARE) 4 % Patch     lisinopril (ZESTRIL) 10 MG tablet     melatonin 1 MG TABS tablet     metFORMIN (GLUCOPHAGE) 500 MG tablet     methyl salicylate-menthol (ICY HOT) ointment     nystatin  (MYCOSTATIN) 823314 UNIT/GM external powder     omeprazole (PRILOSEC) 20 MG DR capsule     oxybutynin ER (DITROPAN-XL) 10 MG 24 hr tablet     pioglitazone (ACTOS) 15 MG tablet     predniSONE (DELTASONE) 1 MG tablet     tacrolimus (PROTOPIC) 0.1 % external ointment     triamcinolone (KENALOG) 0.1 % external cream     triamcinolone (KENALOG) 0.1 % external cream     vitamin D3 (CHOLECALCIFEROL) 50 mcg (2000 units) tablet     zinc gluconate 50 MG tablet     ACE/ARB NOT PRESCRIBED, INTENTIONAL,     acetaminophen (TYLENOL) 325 MG tablet     Barberry-Oreg Grape-Goldenseal (BERBERINE COMPLEX PO)     blood glucose (ACCU-CHEK FASTCLIX) lancing device     blood glucose (CONTOUR TEST) test strip     chlorthalidone (HYGROTON) 25 MG tablet     CONTOUR NEXT TEST test strip     diazepam (VALIUM) 1 MG/ML solution     fluconazole (DIFLUCAN) 150 MG tablet     gabapentin (NEURONTIN) 100 MG capsule     HYDROmorphone (DILAUDID) 2 MG tablet     HYDROmorphone (DILAUDID) 2 MG tablet     HYDROmorphone (DILAUDID) 2 MG tablet     order for DME     saccharomyces boulardii (FLORASTOR) 250 MG capsule     No current facility-administered medications for this visit.       Past Medical History:   Diagnosis Date     Abdominal pain      Anxiety      Arthritis      Asthma      Bipolar 1 disorder (H)      Chronic pain      Cochlear hydrops 1988    steriods and diazide     COPD (chronic obstructive pulmonary disease) (H)      Depression      Diabetes mellitus (H)      Dyspepsia      GERD (gastroesophageal reflux disease)      Hard of hearing     Right ear deaf.  Left ear poor hearing/aid     Hepatic abscess      Hyperlipidemia      Hypertension      Irritable bowel syndrome      Meniere disease      Neuropathy      Noninfectious ileitis      Peritoneal abscess (H)      Spinal stenosis of lumbar region      Steroid long-term use      Vaginitis, atrophic, postmenopausal      Vitamin D deficiency        Social History   reports that she quit smoking about  33 years ago. Her smoking use included cigarettes. She has quit using smokeless tobacco. She reports previous alcohol use. She reports that she does not use drugs.    Family History   Problem Relation Age of Onset     Cerebrovascular Disease Mother      Heart Disease Mother      Heart Disease Father      Heart Disease Brother      Diabetes No family hx of      Coronary Artery Disease No family hx of      Hypertension No family hx of      Hyperlipidemia No family hx of      Breast Cancer No family hx of      Colon Cancer No family hx of      Prostate Cancer No family hx of      Other Cancer No family hx of      Depression No family hx of      Anxiety Disorder No family hx of      Mental Illness No family hx of      Substance Abuse No family hx of      Anesthesia Reaction No family hx of      Asthma No family hx of      Osteoporosis No family hx of      Genetic Disorder No family hx of      Thyroid Disease No family hx of      Obesity No family hx of      Unknown/Adopted No family hx of        Review of Systems  Constitutional, HEENT, cardiovascular, pulmonary, GI, , musculoskeletal, neuro, skin, endocrine and psych systems are negative, except as otherwise noted.      Objective    /52   Pulse 83   Temp 99.8  F (37.7  C) (Oral)   LMP  (LMP Unknown)   SpO2 91%   Physical Exam   GENERAL: healthy, alert and no distress  EYES: Eyes grossly normal to inspection, PERRL and conjunctivae and sclerae normal  HENT: ear canals and TM's normal, nose and mouth without ulcers or lesions  NECK: no adenopathy, no asymmetry, masses, or scars and thyroid normal to palpation  RESP: lungs clear to auscultation - no rales, rhonchi or wheezes  BREAST: normal without masses, tenderness or nipple discharge and no palpable axillary masses or adenopathy  CV: regular rate and rhythm, normal S1 S2, no S3 or S4, no murmur, click or rub, no peripheral edema and peripheral pulses strong  ABDOMEN: soft, nontender, no hepatosplenomegaly,  no masses and bowel sounds normal  MS: no gross musculoskeletal defects noted, no edema  SKIN: no suspicious lesions or rashes excoriations and dry skin on arms.  NEURO: Normal strength and tone, mentation intact and speech normal  PSYCH: mentation appears normal, affect normal/bright

## 2021-10-03 ENCOUNTER — HEALTH MAINTENANCE LETTER (OUTPATIENT)
Age: 31
End: 2021-10-03

## 2021-11-30 ENCOUNTER — E-VISIT (OUTPATIENT)
Dept: URGENT CARE | Facility: URGENT CARE | Age: 31
End: 2021-11-30
Payer: COMMERCIAL

## 2021-11-30 DIAGNOSIS — Z20.822 SUSPECTED COVID-19 VIRUS INFECTION: Primary | ICD-10-CM

## 2021-11-30 PROCEDURE — 99421 OL DIG E/M SVC 5-10 MIN: CPT | Performed by: NURSE PRACTITIONER

## 2021-11-30 NOTE — PATIENT INSTRUCTIONS
Dear Agustin Ortega,    Your symptoms show that you may have coronavirus (COVID-19). This illness can cause fever, cough and trouble breathing. Many people get a mild case and get better on their own. Some people can get very sick.    Will I be tested for COVID-19?  We would like to test you for Covid-19 virus. I have placed orders for this test.     To schedule: go to your [employer] home page and scroll down to the section that says  You have an appointment that needs to be scheduled  and click the large green button that says  Schedule Now  and follow the steps to find the next available openings.    If you are unable to complete these [employer] scheduling steps, please call [phone] to schedule your testing.     Return to work/school/ guidance:  Please let your workplace manager and staffing office know when your quarantine ends     We can t give you an exact date as it depends on the above. You can calculate this on your own or work with your manager/staffing office to calculate this. (For example if you were exposed on 10/4, you would have to quarantine for 14 full days. That would be through 10/18. You could return on 10/19.)      If you receive a positive COVID-19 test result, follow the guidance of the those who are giving you the results. Usually the return to work is 10 (or in some cases 20 days from symptom onset.) If you work at [facility], you must also be cleared by Employee Occupational Health and Safety to return to work.        If you receive a negative COVID-19 test result and did not have a high risk exposure to someone with a known positive COVID-19 test, you can return to work once you're free of fever for 24 hours without fever-reducing medication and your symptoms are improving or resolved.      If you receive a negative COVID-19 test and If you had a high risk exposure to someone who has tested positive for COVID-19 then you can return to work 14 days after your last contact  with the positive individual    Note: If you have ongoing exposure to the covid positive person, this quarantine period may be more than 14 days. (For example, if you are continued to be exposed to your child who tested positive and cannot isolate from them, then the quarantine of 7-14 days can't start until your child is no longer contagious. This is typically 10 days from onset of the child's symptoms. So the total duration may be 17-24 days in this case.)    Sign up for [employer].   We know it's scary to hear that you might have COVID-19. We want to track your symptoms to make sure you're okay over the next 2 weeks. Please look for an email from [employer]--this is a free, online program that we'll use to keep in touch. To sign up, follow the link in the email you will receive. Learn more at [email or web address]    How can I take care of myself?    Get lots of rest. Drink extra fluids (unless a doctor has told you not to)    Take Tylenol (acetaminophen) or ibuprofen for fever or pain. If you have liver or kidney problems, ask your family doctor if it's okay to take Tylenol o ibuprofen    If you have other health problems (like cancer, heart failure, an organ transplant or severe kidney disease): Call your specialty clinic if you don't feel better in the next 2 days.    Know when to call 911. Emergency warning signs include:  o Trouble breathing or shortness of breath  o Pain or pressure in the chest that doesn't go away  o Feeling confused like you haven't felt before, or not being able to wake up  o Bluish-colored lips or face    Where can I get more information?  [facility] - About COVID-19:   [email or web address]    CDC - What to Do If You're Sick:   www.cdc.gov/coronavirus/2019-ncov/about/steps-when-sick.html

## 2021-12-01 ENCOUNTER — LAB (OUTPATIENT)
Dept: URGENT CARE | Facility: URGENT CARE | Age: 31
End: 2021-12-01
Attending: NURSE PRACTITIONER
Payer: COMMERCIAL

## 2021-12-01 DIAGNOSIS — Z20.822 SUSPECTED COVID-19 VIRUS INFECTION: ICD-10-CM

## 2021-12-01 LAB — SARS-COV-2 RNA RESP QL NAA+PROBE: NEGATIVE

## 2021-12-01 PROCEDURE — U0003 INFECTIOUS AGENT DETECTION BY NUCLEIC ACID (DNA OR RNA); SEVERE ACUTE RESPIRATORY SYNDROME CORONAVIRUS 2 (SARS-COV-2) (CORONAVIRUS DISEASE [COVID-19]), AMPLIFIED PROBE TECHNIQUE, MAKING USE OF HIGH THROUGHPUT TECHNOLOGIES AS DESCRIBED BY CMS-2020-01-R: HCPCS

## 2021-12-01 PROCEDURE — U0005 INFEC AGEN DETEC AMPLI PROBE: HCPCS

## 2022-02-04 ENCOUNTER — IMMUNIZATION (OUTPATIENT)
Dept: NURSING | Facility: CLINIC | Age: 32
End: 2022-02-04
Payer: COMMERCIAL

## 2022-02-04 PROCEDURE — 0054A COVID-19,PF,PFIZER (12+ YRS): CPT

## 2022-02-04 PROCEDURE — 91305 COVID-19,PF,PFIZER (12+ YRS): CPT

## 2022-02-18 ENCOUNTER — TELEPHONE (OUTPATIENT)
Dept: MIDWIFE SERVICES | Facility: CLINIC | Age: 32
End: 2022-02-18
Payer: COMMERCIAL

## 2022-02-18 NOTE — TELEPHONE ENCOUNTER
Reason for Call:  Other appointment    Detailed comments: patient called and would like to schedule a new prenatal appointment at [facility].  Patients gestation is unknown.  Last menstral cycle last November; early December.  Please contact patient.  Thank you.    Phone Number Patient can be reached at: Home number on file [phone] (home)    Best Time: any    Can we leave a detailed message on this number? YES    Call taken on 2/18/2022 at 7:42 AM by Guera Ambrosio

## 2022-03-02 ENCOUNTER — PRENATAL OFFICE VISIT (OUTPATIENT)
Dept: NURSING | Facility: CLINIC | Age: 32
End: 2022-03-02
Payer: COMMERCIAL

## 2022-03-02 VITALS — HEIGHT: 61 IN | BODY MASS INDEX: 30.31 KG/M2

## 2022-03-02 DIAGNOSIS — Z34.80 PRENATAL CARE, SUBSEQUENT PREGNANCY, UNSPECIFIED TRIMESTER: Primary | ICD-10-CM

## 2022-03-02 PROCEDURE — 99207 PR NO CHARGE NURSE ONLY: CPT

## 2022-03-02 NOTE — TELEPHONE ENCOUNTER
Do we know why this pt was cancelled from Jennie's Friday March 11 schedule? Just curious as pt is asking. It is hard to find a spot anytime soon now.      Miley STRANGE RN BSN

## 2022-03-02 NOTE — TELEPHONE ENCOUNTER
Her appointment was double booked over the lunch time so will need to reschedule.   Patient will call back to reschedule

## 2022-03-03 NOTE — PROGRESS NOTES
NPN nurse visit done over the phone. Pt will be given NPN folder and book at her upcoming appt.   Discussed optional screening available to assess chromosomal anomalies. Questions answered. Pt advised to call the clinic if she has any questions or concerns related to her pregnancy. Prenatal labs will be obtained at her upcoming appt. New prenatal visit scheduled on 3/11/22 with Jennie sEquivel CNM.    13w3d    Lab Results   Component Value Date    PAP NIL 02/08/2021           Patient supplied answers from flow sheet for:  Prenatal OB Questionnaire.  Past Medical History  Have you ever recieved care for your mental health? : No  Have you ever been in a major accident or suffered serious trauma?: No  Within the last year, has anyone hit, slapped, kicked or otherwise hurt you?: No  In the last year, has anyone forced you to have sex when you didn't want to?: No    Past Medical History 2   Have you ever received a blood transfusion?: No  Would you accept a blood transfusion if was medically recommended?: Yes  Does anyone in your home smoke?: No   Is your blood type Rh negative?: No  Have you ever ?: (!) Yes  Have you been hospitalized for a nonsurgical reason excluding normal delivery?: No  Have you ever had an abnormal pap smear?: No    Past Medical History (Continued)  Do you have a history of abnormalities of the uterus?: No  Did your mother take LYNDON or any other hormones when she was pregnant with you?: No  Do you have any other problems we have not asked about which you feel may be important to this pregnancy?: No

## 2022-03-04 ENCOUNTER — LAB (OUTPATIENT)
Dept: LAB | Facility: CLINIC | Age: 32
End: 2022-03-04
Payer: COMMERCIAL

## 2022-03-04 ENCOUNTER — ANCILLARY PROCEDURE (OUTPATIENT)
Dept: ULTRASOUND IMAGING | Facility: CLINIC | Age: 32
End: 2022-03-04
Payer: COMMERCIAL

## 2022-03-04 DIAGNOSIS — Z34.80 PRENATAL CARE, SUBSEQUENT PREGNANCY, UNSPECIFIED TRIMESTER: ICD-10-CM

## 2022-03-04 DIAGNOSIS — O09.529 SUPERVISION OF HIGH-RISK PREGNANCY OF ELDERLY MULTIGRAVIDA: Primary | ICD-10-CM

## 2022-03-04 LAB
ERYTHROCYTE [DISTWIDTH] IN BLOOD BY AUTOMATED COUNT: 13 % (ref 10–15)
HBV SURFACE AG SERPL QL IA: NONREACTIVE
HCT VFR BLD AUTO: 37.9 % (ref 35–47)
HCV AB SERPL QL IA: NONREACTIVE
HGB BLD-MCNC: 12.3 G/DL (ref 11.7–15.7)
HIV 1+2 AB+HIV1 P24 AG SERPL QL IA: NONREACTIVE
MCH RBC QN AUTO: 27.5 PG (ref 26.5–33)
MCHC RBC AUTO-ENTMCNC: 32.5 G/DL (ref 31.5–36.5)
MCV RBC AUTO: 85 FL (ref 78–100)
PLATELET # BLD AUTO: 193 10E3/UL (ref 150–450)
RBC # BLD AUTO: 4.48 10E6/UL (ref 3.8–5.2)
T PALLIDUM AB SER QL: NONREACTIVE
WBC # BLD AUTO: 4.6 10E3/UL (ref 4–11)

## 2022-03-04 PROCEDURE — 36415 COLL VENOUS BLD VENIPUNCTURE: CPT

## 2022-03-04 PROCEDURE — 76801 OB US < 14 WKS SINGLE FETUS: CPT | Performed by: OBSTETRICS & GYNECOLOGY

## 2022-03-04 PROCEDURE — 85027 COMPLETE CBC AUTOMATED: CPT

## 2022-03-04 PROCEDURE — 87086 URINE CULTURE/COLONY COUNT: CPT

## 2022-03-04 PROCEDURE — 87340 HEPATITIS B SURFACE AG IA: CPT

## 2022-03-04 PROCEDURE — 86803 HEPATITIS C AB TEST: CPT

## 2022-03-04 PROCEDURE — 76817 TRANSVAGINAL US OBSTETRIC: CPT | Performed by: OBSTETRICS & GYNECOLOGY

## 2022-03-04 PROCEDURE — 86780 TREPONEMA PALLIDUM: CPT

## 2022-03-04 PROCEDURE — 87389 HIV-1 AG W/HIV-1&-2 AB AG IA: CPT

## 2022-03-04 PROCEDURE — 86762 RUBELLA ANTIBODY: CPT

## 2022-03-05 LAB — BACTERIA UR CULT: NORMAL

## 2022-03-07 DIAGNOSIS — O09.529 SUPERVISION OF HIGH-RISK PREGNANCY OF ELDERLY MULTIGRAVIDA: Primary | ICD-10-CM

## 2022-03-07 DIAGNOSIS — Z34.81 ENCOUNTER FOR SUPERVISION OF OTHER NORMAL PREGNANCY IN FIRST TRIMESTER: ICD-10-CM

## 2022-03-07 DIAGNOSIS — Z34.90 SUPERVISION OF NORMAL PREGNANCY: Primary | ICD-10-CM

## 2022-03-07 LAB
RUBV IGG SERPL QL IA: 16.4 INDEX
RUBV IGG SERPL QL IA: POSITIVE

## 2022-03-16 ENCOUNTER — ANCILLARY PROCEDURE (OUTPATIENT)
Dept: ULTRASOUND IMAGING | Facility: CLINIC | Age: 32
End: 2022-03-16
Attending: ADVANCED PRACTICE MIDWIFE
Payer: COMMERCIAL

## 2022-03-16 ENCOUNTER — PRENATAL OFFICE VISIT (OUTPATIENT)
Dept: MIDWIFE SERVICES | Facility: CLINIC | Age: 32
End: 2022-03-16
Payer: COMMERCIAL

## 2022-03-16 VITALS
BODY MASS INDEX: 32.89 KG/M2 | SYSTOLIC BLOOD PRESSURE: 118 MMHG | WEIGHT: 174.2 LBS | HEIGHT: 61 IN | DIASTOLIC BLOOD PRESSURE: 74 MMHG

## 2022-03-16 DIAGNOSIS — Z34.90 SUPERVISION OF NORMAL PREGNANCY: ICD-10-CM

## 2022-03-16 DIAGNOSIS — Z34.81 ENCOUNTER FOR SUPERVISION OF OTHER NORMAL PREGNANCY, FIRST TRIMESTER: Primary | ICD-10-CM

## 2022-03-16 DIAGNOSIS — O21.9 NAUSEA AND VOMITING IN PREGNANCY PRIOR TO 22 WEEKS GESTATION: ICD-10-CM

## 2022-03-16 DIAGNOSIS — R53.81 MALAISE: ICD-10-CM

## 2022-03-16 LAB — HBA1C MFR BLD: 5.5 % (ref 0–5.6)

## 2022-03-16 PROCEDURE — 83036 HEMOGLOBIN GLYCOSYLATED A1C: CPT | Performed by: ADVANCED PRACTICE MIDWIFE

## 2022-03-16 PROCEDURE — 36415 COLL VENOUS BLD VENIPUNCTURE: CPT | Performed by: ADVANCED PRACTICE MIDWIFE

## 2022-03-16 PROCEDURE — 87591 N.GONORRHOEAE DNA AMP PROB: CPT | Performed by: ADVANCED PRACTICE MIDWIFE

## 2022-03-16 PROCEDURE — 99207 PR FIRST OB VISIT: CPT | Performed by: ADVANCED PRACTICE MIDWIFE

## 2022-03-16 PROCEDURE — 87491 CHLMYD TRACH DNA AMP PROBE: CPT | Performed by: ADVANCED PRACTICE MIDWIFE

## 2022-03-16 PROCEDURE — 76801 OB US < 14 WKS SINGLE FETUS: CPT | Performed by: OBSTETRICS & GYNECOLOGY

## 2022-03-16 PROCEDURE — 82306 VITAMIN D 25 HYDROXY: CPT | Performed by: ADVANCED PRACTICE MIDWIFE

## 2022-03-16 RX ORDER — MULTIVITAMIN WITH IRON
100 TABLET ORAL DAILY
COMMUNITY
End: 2022-04-08

## 2022-03-16 RX ORDER — ONDANSETRON 4 MG/1
4 TABLET, ORALLY DISINTEGRATING ORAL EVERY 6 HOURS PRN
Qty: 40 TABLET | Refills: 1 | Status: SHIPPED | OUTPATIENT
Start: 2022-03-16 | End: 2022-05-20 | Stop reason: ALTCHOICE

## 2022-03-16 NOTE — NURSING NOTE
"Chief Complaint   Patient presents with     Prenatal Care     New OB, BABATUNDE: per ultrasound is 10/29/2022, patient is 7w 4d pregnant. States that she is taking Vitamin B6 3x/day and unisom at bedtime for nausea. This is not helping. Throws up the B6 when taking       Initial /74 (BP Location: Left arm, Cuff Size: Adult Regular)   Ht 1.549 m (5' 1\")   Wt 79 kg (174 lb 3.2 oz)   LMP 2021 (Within Weeks)   Breastfeeding No   BMI 32.91 kg/m   Estimated body mass index is 32.91 kg/m  as calculated from the following:    Height as of this encounter: 1.549 m (5' 1\").    Weight as of this encounter: 79 kg (174 lb 3.2 oz).  BP completed using cuff size: regular    Questioned patient about current smoking habits.  Pt. has never smoked.          The following HM Due: NONE      "

## 2022-03-16 NOTE — PROGRESS NOTES
"Agustin \"Zuleima\" DOMINGO Ortega is a 31 year old  woman,  who is a previous CNM patient. She presents for a new OB Visit. This was a planned pregnancy.     FOB is Timi who is in good health.  PEPE IS actively involved in relationship and this pregnancy.    She has had significant nausea in the 1st trimester and reports that yesterday she threw up 11 times. She has been taking vitamin B6 and Unisom, but usually throws up after taking the vitamin B6. She reports that she struggled with nausea and vomiting throughout her entire last pregnancy. So far this pregnancy, she has had a 3# weight loss. She states she is able to tolerate some small frequent meals, but often will vomit after eating.     Zuleima also reports feeling sad and more down lately. Her EPDS score today is 13. She denies any suicidal ideation. She does not have any history of mental illness and has not ever been on an antidepressant or antipsychotic medication. Additionally, she states she was taking a vitamin D tablet for fatigue prior to pregnancy, but stopped taking it once she found out she was pregnant.     She has not had bleeding since her LMP.    She denies abdominal pain since her LMP.  Any personal or family history of blood clots? No  History of sickle cell anemia or trait? No       Patient's last menstrual period was 2021 (within weeks)..  Estimated Date of Delivery: Oct 29, 2022 Ultrasound non consistent with LMP. Patient has irregular menstrual cycles, so will base dating on US.    MENSTRUAL HISTORY    Menses irregular   Last PAP:  2021: NIL/HPV-  History of abnormal Pap?  No    Health maintenance updated:  yes        Current medications are:    Current Outpatient Medications:      doxylamine (UNISOM) 25 MG TABS tablet, Take 25 mg by mouth At Bedtime, Disp: , Rfl:      ondansetron (ZOFRAN-ODT) 4 MG ODT tab, Take 1 tablet (4 mg) by mouth every 6 hours as needed for nausea, Disp: 40 tablet, Rfl: 1     Prenatal MV-Min-Fe " Fum-FA-DHA (PRENATAL 1 PO), , Disp: , Rfl:      vitamin B6 (PYRIDOXINE) 100 MG tablet, Take 100 mg by mouth daily Taking 3x/day, Disp: , Rfl:      INFECTION HISTORY  HIV: No  Hepatitis B: No  Hepatitis C: No  Tuberculosis: No   Genital Herpes self: no  Herpes partner:  no  Chlamydia:  no  Gonorrhea:  no  HPV: No  BV:  No  Syphilis:  No  Chicken Pox:  No    OB HISTORY  OB History    Para Term  AB Living   2 1 1 0 0 1   SAB IAB Ectopic Multiple Live Births   0 0 0 0 1      # Outcome Date GA Lbr Tip/2nd Weight Sex Delivery Anes PTL Lv   2 Current            1 Term 19 40w4d 04:00 / 02:27 2.835 kg (6 lb 4 oz) M Vag-Spont EPI, Local N NEHEMIAS      Name: FERMIN LAYNE-ASHLEY      Apgar1: 8  Apgar5: 9       History of GDM: No,  PTL : No,  History of HTN in pregnancy: No,  Thrombocytopenia: No,  Shoulder dystocia: No,  Vacuum Extraction: No  PPH: No   3rd of 4th degree laceration: No.   Other complications: No    PERSONAL HISTORY  Exercise Habits:  Walking, 1-2 days/week  Employment: Full time.  Her job involves sedentary activity with no potential for toxic exposure.    Travel plans:  are none planned.   Diet: eats regular meals  Prenatal vitamins? Yes  Fish Oil? No  Abuse concerns? Yes  Any history if abuse, varbal, physical, sexual? No  Hgb A1c screen:  BMI > 30: Yes, 1st degree family DM: Yes, two family members; History of GDM: No, PCOS: No, High risk ethnicity: No    - will draw today    Low Dose Aspirin for Preeclampsia Prevention:  Consider for those with 1 high risk or 2  moderate risk factors    High risk: previous pregnancy with preeclampsia, multifetal gestation, chronic htn, diabetes, chronic kidney disease, autoimmune disorder    Medium risk: nulliparity, BMI >30, family hx preeclampsia, age >/= 35,  race, low SES, personal risk factors (hx low birth weight, hx stillbirth, >10yrs between pregnancies).     Patient does not qualify for low dose aspirin therapy      Social History      Socioeconomic History     Marital status:      Spouse name: Timi     Number of children: Not on file     Years of education: Not on file     Highest education level: Not on file   Occupational History     Occupation: [employer]   Tobacco Use     Smoking status: Never Smoker     Smokeless tobacco: Never Used   Vaping Use     Vaping Use: Never used   Substance and Sexual Activity     Alcohol use: No     Drug use: No     Sexual activity: Yes     Partners: Male   Other Topics Concern     Parent/sibling w/ CABG, MI or angioplasty before 65F 55M? No   Social History Narrative     Not on file     Social Determinants of Health     Financial Resource Strain: Not on file   Food Insecurity: Not on file   Transportation Needs: Not on file   Physical Activity: Not on file   Stress: Not on file   Social Connections: Not on file   Intimate Partner Violence: Not on file   Housing Stability: Not on file     She  reports that she has never smoked. She has never used smokeless tobacco.    STD testing offered?  Accepted  Last PHQ-9 score on record = No flowsheet data found.  Last GAD7 score on record = No flowsheet data found.  Alcohol Score = 0    PAST MEDICAL/SURGICAL HISTORY  Past Medical History:   Diagnosis Date     No pertinent past medical history 03/02/2022     Past Surgical History:   Procedure Laterality Date     wisdom teeth  2011       FAMILY HISTORY  Family History   Problem Relation Age of Onset     Diabetes Father      Hypertension Father      Hyperlipidemia Father      Cerebrovascular Disease Father 60     Cancer Maternal Grandmother      Cancer Maternal Grandfather      Diabetes Brother 31        Type 2     ROS:  CONSTITUTIONAL: NEGATIVE for fever, chills, change in weight  INTEGUMENTARU/SKIN: NEGATIVE for worrisome rashes, moles or lesions  EYES: NEGATIVE for vision changes or irritation  ENT: NEGATIVE for ear, mouth and throat problems  RESP: NEGATIVE for significant cough or SOB  BREAST: NEGATIVE for masses,  tenderness or discharge  CV: NEGATIVE for chest pain, palpitations or peripheral edema  GI: NEGATIVE for nausea, abdominal pain, heartburn, or change in bowel habits  : NEGATIVE for unusual urinary or vaginal symptoms. Periods are absent (pregnancy)  MUSCULOSKELETAL: NEGATIVE for significant arthralgias or myalgia  NEURO: NEGATIVE for weakness, dizziness or paresthesias  PSYCHIATRIC: NEGATIVE for changes in mood or affect    PHYSICAL EXAM  Vitals: LMP 2021 (Within Weeks)   BMI= Body mass index is 32.91 kg/m .   GENERAL:  31 year old pleasant pregnant female, alert, cooperative and well groomed.  NECK:  Thyroid without enlargement and nodules.  Lymph nodes not palpable.   LUNGS:  Clear to auscultation.  BREAST: deferred.  HEART:  RRR without murmur.  ABDOMEN: Soft without masses or tenderness.  No scars noted..  GENITALIA: BUS without tenderness or inflammation.  Perineum without lesions.    VAGINA:  Deferred  CERVIX: Deferred  UTERUS: Deferred  ADNEXA: Deferred  RECTAL: Deferred.  LOWER EXTREMITIES: No edema. No significant varicosities.    ASSESSMENT/PLAN:         ICD-10-CM    1. Encounter for supervision of other normal pregnancy, first trimester  Z34.81 NEISSERIA GONORRHOEA PCR     CHLAMYDIA TRACHOMATIS PCR   2. Nausea and vomiting in pregnancy prior to 22 weeks gestation  O21.9 ondansetron (ZOFRAN-ODT) 4 MG ODT tab   3. Malaise  R53.81 Hemoglobin A1c     Vitamin D Deficiency     Hemoglobin A1c     Vitamin D Deficiency      IUP at 15w2d   consult for US for AMA patients: NA  Genetic Testing reviewed and discussed, patient will discuss with  and consider. Handout provided    COUNSELING    Instructed on use of triage nurse line and contacting the on call CNM after hours in an emergency.     Symptoms of N&V and fatigue usually start to resolve around 12-16 weeks     Reviewed CNM philosophy, call schedule for labor and delivery, and FR for delivery    1st OB handout given outlining  appointment spacing and CNM information    Reviewed exercise and nutrition    Recommend to gain 20 pounds with her pregnancy.    Discussed OTC medications. OB med list given    Encouraged patient to arrange  if needed    Encouraged patient to take PNV's/DHA    Travel precautions discussed, no air travel after 36 weeks and Zika Virus discussed    Will call patient with lab results when available    F/U to be addressed next visit: N/V and Depression   - Rx given for Zofran   - Given the severity of nausea and vomiting in pregnancy, will reevaluate symptoms next week. Follow up appt scheduled for 3/21   - At F/U appt will also address depression symptoms. May consider medication or therapy. Patient will consider    Will return to the clinic in 1 week for follow up on N/V and depression. Will call to be seen sooner if problems arise.    CAIN Ortiz    I was present with the student who participated in the service and documentation of the services provided. I have verified the history and personally performed the physical exam and medical decision making as documented by the student and edited by me.  WAN Boateng, COLE 3/16/2022 5:16 PM

## 2022-03-16 NOTE — PATIENT INSTRUCTIONS
Thank you for coming to see the Midwives at the   [facility]!      We will notify you about your labs that were drawn today once we get the results back or if you have [facility] they will be posted there as well      We will call you personally with results that require further discussion      If any referrals were ordered today you should be getting a call in the next week or you may need to call the number listed with your referral to schedule.            If you need any refills of medications please call your pharmacy and they will contact us      If you have any questions or concerns before your next visit, you can reach the clinic at [phone], or send the midwives a message through [facility].      If you  wish to schedule another appointment, please call our office at [phone]. You can also make appointments through [facility]        If you have a medical emergency please call 132.    Because you are pregnant, we have additional resources for you:      You may call our consulting RN's during normal business hours for non-urgent questions about your pregnancy.      After hours you may reach someone for urgent questions or issues at [phone].  There is always a midwife on call 24 hours a day.    Prenatal Reminders:    Before 14 weeks: Dating ultrasound, Genetic testing       This ultrasound helps us determine your dates accurately. Verifi can be drawn anytime after 10 weeks of gestation  16 weeks: Optional genetic testing (quad screen) or single AFP       This testing helps understand your baby's risk for some genetic abnormalities.  20 weeks:  Screening ultrasound (fetal survey)       This testing will look for early growth abnormalities, and may tell the baby's sex if you wish to find out.  28 weeks: One hour sugar test (GCT), hemoglobin and platelets       This test helps identify diabetes of pregnancy or gestational diabetes.  We also look      at the iron in your blood and how well your blood  clots.  28 - 36 weeks: Tetanus shot (Tdap)       This shot helps protect you and your baby from tetanus and whooping cough.  36 weeks and later: Group B Strep test (GBS)       This test helps predict if you need antibiotics in labor to prevent infection in your baby.  Anytime September to April:  Flu shot       This shot helps protect you and your family from the flu.  This is especially important during pregnancy        Any time during or after your pregnancy you may experience increased depression and/or mood changes.    We are here to support you. Please contact us if you are:    Feeling anxious    Overwhelmed or sad     Trouble sleeping    Crying uncontrollably    Trouble caring for yourself or baby.    The typical schedule after your first visit today you can expect:     Visit 2 - 12-16 weeks  Visit 3 - 20 weeks  Visit 4 - 24 weeks  Visit 5 - 28 weeks  Visit 6 - 32 weeks  Visit 7 - 34 weeks  Visit 8 - 36 weeks  Weekly after 36 weeks until delivery.    If anything comes up between your visits or you have concerns please don't hesitate to contact us.    Secure access to your medical record:  Use [employer] (secure email communication and access to your chart) to send your primary care provider a message or make an appointment. Ask someone on your Team how to sign up for [employer]. To log on to [employer] or for more information in [employer] please visit the website at [email or web address].       Certified Nurse Midwife (CNM) Team  WAN Boateng, CNM  Phoebe Blanco APRN, CNM  Jennie Esquivel, APRN, CNM  Haley Mayberry, WAN, CNM  WAN Eisenberg, CNM    Again, thank you for choosing the midwives at [facility].  We are excited to be a part of your pregnancy. Please let us know how we can best partner with you to improve your and your family's health.

## 2022-03-17 PROBLEM — R79.89 LOW VITAMIN D LEVEL: Status: ACTIVE | Noted: 2022-03-17

## 2022-03-17 LAB
C TRACH DNA SPEC QL NAA+PROBE: NEGATIVE
DEPRECATED CALCIDIOL+CALCIFEROL SERPL-MC: 19 UG/L (ref 20–75)
N GONORRHOEA DNA SPEC QL NAA+PROBE: NEGATIVE

## 2022-03-19 ENCOUNTER — HEALTH MAINTENANCE LETTER (OUTPATIENT)
Age: 32
End: 2022-03-19

## 2022-03-21 ENCOUNTER — PRENATAL OFFICE VISIT (OUTPATIENT)
Dept: MIDWIFE SERVICES | Facility: CLINIC | Age: 32
End: 2022-03-21
Payer: COMMERCIAL

## 2022-03-21 VITALS — BODY MASS INDEX: 33.16 KG/M2 | WEIGHT: 175.5 LBS | SYSTOLIC BLOOD PRESSURE: 112 MMHG | DIASTOLIC BLOOD PRESSURE: 72 MMHG

## 2022-03-21 DIAGNOSIS — O21.9 NAUSEA AND VOMITING IN PREGNANCY PRIOR TO 22 WEEKS GESTATION: Primary | ICD-10-CM

## 2022-03-21 PROCEDURE — 99207 PR PRENATAL VISIT: CPT | Performed by: ADVANCED PRACTICE MIDWIFE

## 2022-03-21 NOTE — NURSING NOTE
"Chief Complaint   Patient presents with     Prenatal Care     8 weeks 2 days, no c/o VB, cramping. Has some random epsiodes of abdominal pain- feels like gas pain, but patient states she is not constipated.      Nausea & Vomiting     getting better. Having 1-2 episodes of vomiting daily now. Still having nausea.     Depression     getting better, since she is not vomiting as much.  She is able to sleep and eat now.        Initial /72   Wt 79.6 kg (175 lb 8 oz)   LMP 2021 (Within Weeks)   BMI 33.16 kg/m   Estimated body mass index is 33.16 kg/m  as calculated from the following:    Height as of 3/16/22: 1.549 m (5' 1\").    Weight as of this encounter: 79.6 kg (175 lb 8 oz).  BP completed using cuff size: regular    Questioned patient about current smoking habits.  Pt. has never smoked.          The following HM Due: NONE      Ashtyn Feldman CMA               "

## 2022-03-21 NOTE — PROGRESS NOTES
S: Patient feels much better. Zofran working well for her. Depression screen also improved. Patient feels that her nausea was also affecting her mood.  Patient has had nausea and has had vomiting  O: /72   Wt 79.6 kg (175 lb 8 oz)   LMP 11/29/2021 (Within Weeks)   BMI 33.16 kg/m         Exam:  Constitutional: healthy, alert and no distress  Respiratory: Respirations even and unlabored  Gastrointestinal: Abdomen soft, non-tender. Fundus measures appropriately for gestational age. Fetal heart tones heard easily  Psychiatric: mentation appears normal and affect normal/bright  A:    Diagnosis Comments   1. Nausea and vomiting in pregnancy prior to 22 weeks gestation         P:  Educated about diet and exercise and normal weight gain  Normal to feel movement between 18-22 weeks  Reviewed labs from 1st OB  Discussed genetic screening; patient has not had NIPS  Warning signs discussed     Return to clinic 4 weeks  Encouraged patient to call with any questions or concerns.    WAN Boateng, CNM

## 2022-04-04 ENCOUNTER — MYC MEDICAL ADVICE (OUTPATIENT)
Dept: MIDWIFE SERVICES | Facility: CLINIC | Age: 32
End: 2022-04-04
Payer: COMMERCIAL

## 2022-04-04 DIAGNOSIS — O21.9 NAUSEA AND VOMITING IN PREGNANCY: Primary | ICD-10-CM

## 2022-04-06 RX ORDER — METOCLOPRAMIDE 5 MG/1
5 TABLET ORAL
Qty: 20 TABLET | Refills: 0 | Status: SHIPPED | OUTPATIENT
Start: 2022-04-06 | End: 2022-05-20

## 2022-04-08 DIAGNOSIS — O21.9 NAUSEA/VOMITING IN PREGNANCY: Primary | ICD-10-CM

## 2022-04-10 ENCOUNTER — NURSE TRIAGE (OUTPATIENT)
Dept: NURSING | Facility: CLINIC | Age: 32
End: 2022-04-10
Payer: COMMERCIAL

## 2022-04-11 NOTE — TELEPHONE ENCOUNTER
"  OB Triage Call      Is patient's OB/Midwife with the [facility] or [facility]? [facility]- Proceed with triage     Reason for call: Nausea and vomiting     Assessment: Pt called stating she is 11 weeks pregnant and having hard time keeping food down due to nausea therefore unable to keep anything down..Pt stated she is taking regal and Zofran every three hours.     Pt reported after breakfast she ate twice and vomited back and since then have been taking sips of water every 10 to 15 minutes. Pt stated she also tried Gatorade.  Pt states her urinae is concentrated \"dark yellow\",  and a little bit \"dizzy\". Pt also reported she has abdominal pain rated 3/10. Pt states at what point should she go to the emergency room to be seen?  Pt also reported she didn't take the prescribed benadryl because pharmacist told her it is not safe during pregnancy. Pt stated she can hold things down if she lays down in one position the moment she stands up and moves she cant keep anything down.         Plan: RN paged on call provider, received a return call from [place], provider was informed, and she state dif pt is unable to keep anything down, dizzy and urine is dark, then okay pt to go to the emergency room to have Iv fluids.      RN called pt back and relayed provider advice that it is okay to go to the ER to get Iv fluids. Pt  Stated okay. Pt was also told per [place] that it is safe to take the benadryl  as they already talk the about.Pt stated okay.              Patient plans to deliver at [facility]     Patient's primary OB Provider is Sari Mcdaniels CNM    Per protocol recommendations pt advised to go to the ER to get IV fluids.    Is patient's delivering hospital on divert? No      11w1d    Estimated Date of Delivery: Oct 29, 2022        OB History    Para Term  AB Living   2 1 1 0 0 1   SAB IAB Ectopic Multiple Live Births   0 0 0 0 1      # Outcome Date GA Lbr Tip/2nd Weight Sex Delivery Anes PTL Lv " "  2 Current            1 Term 07/28/19 40w4d 04:00 / 02:27 2.835 kg (6 lb 4 oz) M Vag-Spont EPI, Local N NEHEMIAS      Name: FERMIN LAYNE-ASHLEY      Apgar1: 8  Apgar5: 9       Lab Results   Component Value Date    GBS Negative 06/21/2019          Michael Ospina RN 04/10/22 10:05 PM  [facility] Nurse Advisor              Reason for Disposition    [1] Drinking very little AND [2] dehydration suspected (e.g., no urine > 12 hours, very dry mouth, very lightheaded)    Additional Information    Negative: Sounds like a life-threatening emergency to the triager    Negative: [1] Vaginal bleeding AND [2] pregnant < 20 weeks    Negative: [1] Abdominal pain AND [2] pregnant < 20 weeks    Negative: Headache is main symptom    Negative: [1] Vomiting AND [2] contains red blood or black (\"coffee ground\") material  (Exception: few red streaks in vomit that only happened once)    Negative: [1] Insulin-dependent diabetes (Type I) AND [2] glucose > 400 mg/dl (22 mmol/l)    Negative: Recent head injury (within last 3 days)    Negative: Recent abdominal injury (within last 3 days)    Negative: Severe pain in one eye    Negative: [1] SEVERE vomiting (e.g., 8 or more times / day) AND [2] present > 8 hours    Protocols used: PREGNANCY - MORNING SICKNESS (NAUSEA AND VOMITING OF PREGNANCY)-A-      "

## 2022-04-22 ENCOUNTER — PRENATAL OFFICE VISIT (OUTPATIENT)
Dept: MIDWIFE SERVICES | Facility: CLINIC | Age: 32
End: 2022-04-22
Payer: COMMERCIAL

## 2022-04-22 VITALS — WEIGHT: 169 LBS | BODY MASS INDEX: 31.93 KG/M2 | SYSTOLIC BLOOD PRESSURE: 100 MMHG | DIASTOLIC BLOOD PRESSURE: 68 MMHG

## 2022-04-22 DIAGNOSIS — O21.9 NAUSEA AND VOMITING IN PREGNANCY PRIOR TO 22 WEEKS GESTATION: Primary | ICD-10-CM

## 2022-04-22 PROCEDURE — 99207 PR PRENATAL VISIT: CPT | Performed by: ADVANCED PRACTICE MIDWIFE

## 2022-04-22 RX ORDER — ONDANSETRON 4 MG/1
8 TABLET, ORALLY DISINTEGRATING ORAL EVERY 8 HOURS PRN
Qty: 30 TABLET | Refills: 4 | Status: SHIPPED | OUTPATIENT
Start: 2022-04-22 | End: 2022-05-20

## 2022-04-22 RX ORDER — ONDANSETRON 2 MG/ML
4 INJECTION INTRAMUSCULAR; INTRAVENOUS ONCE
Status: CANCELLED | OUTPATIENT
Start: 2022-04-22 | End: 2022-04-22

## 2022-04-22 RX ORDER — SCOLOPAMINE TRANSDERMAL SYSTEM 1 MG/1
1 PATCH, EXTENDED RELEASE TRANSDERMAL
Qty: 10 PATCH | Refills: 1 | Status: SHIPPED | OUTPATIENT
Start: 2022-04-22 | End: 2022-05-20

## 2022-04-22 RX ORDER — HEPARIN SODIUM,PORCINE 10 UNIT/ML
5 VIAL (ML) INTRAVENOUS
Status: CANCELLED | OUTPATIENT
Start: 2022-04-22

## 2022-04-22 RX ORDER — HEPARIN SODIUM (PORCINE) LOCK FLUSH IV SOLN 100 UNIT/ML 100 UNIT/ML
5 SOLUTION INTRAVENOUS
Status: CANCELLED | OUTPATIENT
Start: 2022-04-22

## 2022-04-22 RX ORDER — DEXTROSE, SODIUM CHLORIDE, SODIUM LACTATE, POTASSIUM CHLORIDE, AND CALCIUM CHLORIDE 5; .6; .31; .03; .02 G/100ML; G/100ML; G/100ML; G/100ML; G/100ML
1000 INJECTION, SOLUTION INTRAVENOUS ONCE
Status: CANCELLED | OUTPATIENT
Start: 2022-04-22 | End: 2022-04-22

## 2022-04-22 NOTE — PROGRESS NOTES
S: Patient feels awful. Continues to have nausea and is vomiting 3-8 times per day. Is taking Zofran and reglan with little improvement  O: /68 (BP Location: Left arm, Cuff Size: Adult Regular)   Wt 76.7 kg (169 lb)   LMP 11/29/2021 (Within Weeks)   BMI 31.93 kg/m         Exam:  Constitutional: healthy, alert and no distress  Respiratory: Respirations even and unlabored  Gastrointestinal: Abdomen soft, non-tender. Fundus measures appropriately for gestational age. Fetal heart tones heard easily  Psychiatric: mentation appears normal and affect normal/bright  A:    Diagnosis Comments   1. Nausea and vomiting in pregnancy prior to 22 weeks gestation  scopolamine (TRANSDERM) 1 MG/3DAYS 72 hr patch, ondansetron (ZOFRAN-ODT) 4 MG ODT tab, Asymptomatic COVID-19 Virus (Coronavirus) by PCR        P: Will start scopolamine patch and infusion therapy. Advised patient to call if no improvement.  Educated about diet and exercise and normal weight gain  Normal to feel movement between 18-22 weeks  Reviewed labs from 1st OB    Warning signs discussed  Discussed option for Quad screen at next visit.   Return to clinic 4 weeks  Encouraged patient to call with any questions or concerns.    WAN Boateng, CNM

## 2022-04-22 NOTE — NURSING NOTE
"Chief Complaint   Patient presents with     Prenatal Care     12w 6d, c/o N/V-vomits around 3-8 times a day-meds aren't really helping       Initial /68 (BP Location: Left arm, Cuff Size: Adult Regular)   Wt 76.7 kg (169 lb)   LMP 2021 (Within Weeks)   BMI 31.93 kg/m   Estimated body mass index is 31.93 kg/m  as calculated from the following:    Height as of 3/16/22: 1.549 m (5' 1\").    Weight as of this encounter: 76.7 kg (169 lb).  BP completed using cuff size: regular    Questioned patient about current smoking habits.  Pt. has never smoked.          The following HM Due: NONE    "

## 2022-04-25 ENCOUNTER — LAB (OUTPATIENT)
Dept: LAB | Facility: CLINIC | Age: 32
End: 2022-04-25
Payer: COMMERCIAL

## 2022-04-25 DIAGNOSIS — O21.9 NAUSEA AND VOMITING IN PREGNANCY PRIOR TO 22 WEEKS GESTATION: ICD-10-CM

## 2022-04-25 PROCEDURE — U0005 INFEC AGEN DETEC AMPLI PROBE: HCPCS

## 2022-04-25 PROCEDURE — U0003 INFECTIOUS AGENT DETECTION BY NUCLEIC ACID (DNA OR RNA); SEVERE ACUTE RESPIRATORY SYNDROME CORONAVIRUS 2 (SARS-COV-2) (CORONAVIRUS DISEASE [COVID-19]), AMPLIFIED PROBE TECHNIQUE, MAKING USE OF HIGH THROUGHPUT TECHNOLOGIES AS DESCRIBED BY CMS-2020-01-R: HCPCS

## 2022-04-26 LAB — SARS-COV-2 RNA RESP QL NAA+PROBE: NEGATIVE

## 2022-04-27 ENCOUNTER — MYC MEDICAL ADVICE (OUTPATIENT)
Dept: MIDWIFE SERVICES | Facility: CLINIC | Age: 32
End: 2022-04-27

## 2022-04-27 ENCOUNTER — INFUSION THERAPY VISIT (OUTPATIENT)
Dept: INFUSION THERAPY | Facility: CLINIC | Age: 32
End: 2022-04-27
Attending: INTERNAL MEDICINE
Payer: COMMERCIAL

## 2022-04-27 VITALS
TEMPERATURE: 97.6 F | RESPIRATION RATE: 16 BRPM | HEART RATE: 76 BPM | SYSTOLIC BLOOD PRESSURE: 99 MMHG | OXYGEN SATURATION: 100 % | DIASTOLIC BLOOD PRESSURE: 67 MMHG

## 2022-04-27 DIAGNOSIS — O21.9 NAUSEA AND VOMITING IN PREGNANCY PRIOR TO 22 WEEKS GESTATION: Primary | ICD-10-CM

## 2022-04-27 PROCEDURE — 96361 HYDRATE IV INFUSION ADD-ON: CPT

## 2022-04-27 PROCEDURE — 258N000003 HC RX IP 258 OP 636: Performed by: ADVANCED PRACTICE MIDWIFE

## 2022-04-27 PROCEDURE — 250N000011 HC RX IP 250 OP 636: Performed by: ADVANCED PRACTICE MIDWIFE

## 2022-04-27 PROCEDURE — 96374 THER/PROPH/DIAG INJ IV PUSH: CPT

## 2022-04-27 RX ORDER — ONDANSETRON 2 MG/ML
4 INJECTION INTRAMUSCULAR; INTRAVENOUS ONCE
Status: COMPLETED | OUTPATIENT
Start: 2022-04-27 | End: 2022-04-27

## 2022-04-27 RX ORDER — HEPARIN SODIUM,PORCINE 10 UNIT/ML
5 VIAL (ML) INTRAVENOUS
Status: CANCELLED | OUTPATIENT
Start: 2022-04-27

## 2022-04-27 RX ORDER — ONDANSETRON 2 MG/ML
4 INJECTION INTRAMUSCULAR; INTRAVENOUS ONCE
Status: CANCELLED | OUTPATIENT
Start: 2022-04-27 | End: 2022-04-27

## 2022-04-27 RX ORDER — DEXTROSE, SODIUM CHLORIDE, SODIUM LACTATE, POTASSIUM CHLORIDE, AND CALCIUM CHLORIDE 5; .6; .31; .03; .02 G/100ML; G/100ML; G/100ML; G/100ML; G/100ML
1000 INJECTION, SOLUTION INTRAVENOUS ONCE
Status: CANCELLED | OUTPATIENT
Start: 2022-04-27 | End: 2022-04-27

## 2022-04-27 RX ORDER — DEXTROSE, SODIUM CHLORIDE, SODIUM LACTATE, POTASSIUM CHLORIDE, AND CALCIUM CHLORIDE 5; .6; .31; .03; .02 G/100ML; G/100ML; G/100ML; G/100ML; G/100ML
1000 INJECTION, SOLUTION INTRAVENOUS ONCE
Status: COMPLETED | OUTPATIENT
Start: 2022-04-27 | End: 2022-04-27

## 2022-04-27 RX ORDER — HEPARIN SODIUM (PORCINE) LOCK FLUSH IV SOLN 100 UNIT/ML 100 UNIT/ML
5 SOLUTION INTRAVENOUS
Status: CANCELLED | OUTPATIENT
Start: 2022-04-27

## 2022-04-27 RX ADMIN — SODIUM CHLORIDE, POTASSIUM CHLORIDE, SODIUM LACTATE AND CALCIUM CHLORIDE 1000 ML: 600; 310; 30; 20 INJECTION, SOLUTION INTRAVENOUS at 10:02

## 2022-04-27 RX ADMIN — ONDANSETRON 4 MG: 2 INJECTION INTRAMUSCULAR; INTRAVENOUS at 09:05

## 2022-04-27 RX ADMIN — SODIUM CHLORIDE, SODIUM LACTATE, POTASSIUM CHLORIDE, CALCIUM CHLORIDE AND DEXTROSE MONOHYDRATE 1000 ML: 5; 600; 310; 30; 20 INJECTION, SOLUTION INTRAVENOUS at 08:55

## 2022-04-27 NOTE — LETTER
[facility] [address], [address]  [place], [place]  [address]                                            TEL:[phone]  FAX:[phone]        Agustin Ortega  [address] [facility] [address]          April 28, 2022          Please excuse Agustin from work twice a week so she can obtain IV infusion therapy for her hyperemesis until she is able to be seen for follow up on May 20.    Please feel free to call me if you have any questions or concerns.                Sincerely,        WAN Boateng, CNM

## 2022-04-29 ENCOUNTER — INFUSION THERAPY VISIT (OUTPATIENT)
Dept: INFUSION THERAPY | Facility: CLINIC | Age: 32
End: 2022-04-29
Attending: ADVANCED PRACTICE MIDWIFE
Payer: COMMERCIAL

## 2022-04-29 VITALS
SYSTOLIC BLOOD PRESSURE: 114 MMHG | OXYGEN SATURATION: 98 % | DIASTOLIC BLOOD PRESSURE: 70 MMHG | RESPIRATION RATE: 16 BRPM | HEART RATE: 77 BPM | TEMPERATURE: 98.2 F

## 2022-04-29 DIAGNOSIS — O21.9 NAUSEA AND VOMITING IN PREGNANCY PRIOR TO 22 WEEKS GESTATION: Primary | ICD-10-CM

## 2022-04-29 PROCEDURE — 258N000003 HC RX IP 258 OP 636: Performed by: ADVANCED PRACTICE MIDWIFE

## 2022-04-29 PROCEDURE — 250N000011 HC RX IP 250 OP 636: Performed by: ADVANCED PRACTICE MIDWIFE

## 2022-04-29 PROCEDURE — 96374 THER/PROPH/DIAG INJ IV PUSH: CPT

## 2022-04-29 PROCEDURE — 96361 HYDRATE IV INFUSION ADD-ON: CPT

## 2022-04-29 RX ORDER — HEPARIN SODIUM,PORCINE 10 UNIT/ML
5 VIAL (ML) INTRAVENOUS
Status: CANCELLED | OUTPATIENT
Start: 2022-04-29

## 2022-04-29 RX ORDER — DEXTROSE, SODIUM CHLORIDE, SODIUM LACTATE, POTASSIUM CHLORIDE, AND CALCIUM CHLORIDE 5; .6; .31; .03; .02 G/100ML; G/100ML; G/100ML; G/100ML; G/100ML
1000 INJECTION, SOLUTION INTRAVENOUS ONCE
Status: COMPLETED | OUTPATIENT
Start: 2022-04-29 | End: 2022-04-29

## 2022-04-29 RX ORDER — ONDANSETRON 2 MG/ML
4 INJECTION INTRAMUSCULAR; INTRAVENOUS ONCE
Status: COMPLETED | OUTPATIENT
Start: 2022-04-29 | End: 2022-04-29

## 2022-04-29 RX ORDER — DEXTROSE, SODIUM CHLORIDE, SODIUM LACTATE, POTASSIUM CHLORIDE, AND CALCIUM CHLORIDE 5; .6; .31; .03; .02 G/100ML; G/100ML; G/100ML; G/100ML; G/100ML
1000 INJECTION, SOLUTION INTRAVENOUS ONCE
Status: CANCELLED | OUTPATIENT
Start: 2022-04-29 | End: 2022-04-29

## 2022-04-29 RX ORDER — HEPARIN SODIUM (PORCINE) LOCK FLUSH IV SOLN 100 UNIT/ML 100 UNIT/ML
5 SOLUTION INTRAVENOUS
Status: CANCELLED | OUTPATIENT
Start: 2022-04-29

## 2022-04-29 RX ORDER — ONDANSETRON 2 MG/ML
4 INJECTION INTRAMUSCULAR; INTRAVENOUS ONCE
Status: CANCELLED | OUTPATIENT
Start: 2022-04-29 | End: 2022-04-29

## 2022-04-29 RX ADMIN — ONDANSETRON 4 MG: 2 INJECTION INTRAMUSCULAR; INTRAVENOUS at 14:01

## 2022-04-29 RX ADMIN — SODIUM CHLORIDE, SODIUM LACTATE, POTASSIUM CHLORIDE, CALCIUM CHLORIDE AND DEXTROSE MONOHYDRATE 1000 ML: 5; 600; 310; 30; 20 INJECTION, SOLUTION INTRAVENOUS at 13:56

## 2022-04-29 NOTE — PROGRESS NOTES
Infusion Nursing Note:  Agustin Ortega presents today for IVF+zofran.    Patient seen by provider today: No   present during visit today: Not Applicable.    Note: C/o's nausea which was relieved with IV zofran, Vomited prior to admission. Denies pain.      Intravenous Access:  Peripheral IV placed. Pt is a hard stick, PIV infiltrated 1, warm compress applied.    Treatment Conditions:  Not Applicable.      Post Infusion Assessment:  Patient tolerated infusion without incident but did not finish the whole infusion d/t family matter.  Blood return noted pre and post infusion.  Site patent and intact, free from redness, edema or discomfort.  No evidence of extravasations.  Access discontinued per protocol.       Discharge Plan:   Discharge instructions reviewed with: Patient.  Patient and/or family verbalized understanding of discharge instructions and all questions answered.  AVS to patient via [employer]. Patient discharged in stable condition accompanied by: self.  Departure Mode: Ambulatory.      Adrianna Shelton RN

## 2022-05-02 ENCOUNTER — INFUSION THERAPY VISIT (OUTPATIENT)
Dept: INFUSION THERAPY | Facility: CLINIC | Age: 32
End: 2022-05-02
Attending: ADVANCED PRACTICE MIDWIFE
Payer: COMMERCIAL

## 2022-05-02 VITALS
SYSTOLIC BLOOD PRESSURE: 100 MMHG | OXYGEN SATURATION: 99 % | DIASTOLIC BLOOD PRESSURE: 66 MMHG | RESPIRATION RATE: 16 BRPM | TEMPERATURE: 98.4 F | HEART RATE: 83 BPM

## 2022-05-02 DIAGNOSIS — O21.9 NAUSEA AND VOMITING IN PREGNANCY PRIOR TO 22 WEEKS GESTATION: Primary | ICD-10-CM

## 2022-05-02 PROCEDURE — 250N000011 HC RX IP 250 OP 636: Performed by: ADVANCED PRACTICE MIDWIFE

## 2022-05-02 PROCEDURE — 96374 THER/PROPH/DIAG INJ IV PUSH: CPT

## 2022-05-02 PROCEDURE — 258N000003 HC RX IP 258 OP 636: Performed by: ADVANCED PRACTICE MIDWIFE

## 2022-05-02 PROCEDURE — 96361 HYDRATE IV INFUSION ADD-ON: CPT

## 2022-05-02 RX ORDER — ONDANSETRON 2 MG/ML
4 INJECTION INTRAMUSCULAR; INTRAVENOUS ONCE
Status: COMPLETED | OUTPATIENT
Start: 2022-05-02 | End: 2022-05-02

## 2022-05-02 RX ORDER — HEPARIN SODIUM (PORCINE) LOCK FLUSH IV SOLN 100 UNIT/ML 100 UNIT/ML
5 SOLUTION INTRAVENOUS
Status: CANCELLED | OUTPATIENT
Start: 2022-05-02

## 2022-05-02 RX ORDER — DEXTROSE, SODIUM CHLORIDE, SODIUM LACTATE, POTASSIUM CHLORIDE, AND CALCIUM CHLORIDE 5; .6; .31; .03; .02 G/100ML; G/100ML; G/100ML; G/100ML; G/100ML
1000 INJECTION, SOLUTION INTRAVENOUS ONCE
Status: CANCELLED | OUTPATIENT
Start: 2022-05-02 | End: 2022-05-02

## 2022-05-02 RX ORDER — HEPARIN SODIUM,PORCINE 10 UNIT/ML
5 VIAL (ML) INTRAVENOUS
Status: CANCELLED | OUTPATIENT
Start: 2022-05-02

## 2022-05-02 RX ORDER — ONDANSETRON 2 MG/ML
4 INJECTION INTRAMUSCULAR; INTRAVENOUS ONCE
Status: CANCELLED | OUTPATIENT
Start: 2022-05-02 | End: 2022-05-02

## 2022-05-02 RX ORDER — DEXTROSE, SODIUM CHLORIDE, SODIUM LACTATE, POTASSIUM CHLORIDE, AND CALCIUM CHLORIDE 5; .6; .31; .03; .02 G/100ML; G/100ML; G/100ML; G/100ML; G/100ML
1000 INJECTION, SOLUTION INTRAVENOUS ONCE
Status: COMPLETED | OUTPATIENT
Start: 2022-05-02 | End: 2022-05-02

## 2022-05-02 RX ADMIN — SODIUM CHLORIDE, POTASSIUM CHLORIDE, SODIUM LACTATE AND CALCIUM CHLORIDE 1000 ML: 600; 310; 30; 20 INJECTION, SOLUTION INTRAVENOUS at 13:31

## 2022-05-02 RX ADMIN — SODIUM CHLORIDE, SODIUM LACTATE, POTASSIUM CHLORIDE, CALCIUM CHLORIDE AND DEXTROSE MONOHYDRATE 1000 ML: 5; 600; 310; 30; 20 INJECTION, SOLUTION INTRAVENOUS at 13:42

## 2022-05-02 RX ADMIN — ONDANSETRON 4 MG: 2 INJECTION INTRAMUSCULAR; INTRAVENOUS at 13:39

## 2022-05-02 NOTE — PROGRESS NOTES
Infusion Nursing Note:  Agustin Ortega presents today for IVF, Zofran.    Patient seen by provider today: No   present during visit today: Not Applicable.    Note: Still having nausea, no emesis today. Has not taken Zofran today prior to appointment. Feels the IVF/Zofran have been helping for a couple days after receiving. Is working on coordinating infusion appointments with her work schedule; will stop at the  on her way out.    Intravenous Access:  Peripheral IV placed.    Treatment Conditions:  Not Applicable.    Post Infusion Assessment:  Patient tolerated infusion without incident.  Blood return noted pre and post infusion.  Site patent and intact, free from redness, edema or discomfort.  No evidence of extravasations.  Access discontinued per protocol.     Discharge Plan:   AVS to patient via MYCHART.  Patient will schedule next appointment on her way out.   Patient discharged in stable condition accompanied by: self.  Departure Mode: Ambulatory.    Nisa Graves RN

## 2022-05-03 ENCOUNTER — OFFICE VISIT (OUTPATIENT)
Dept: URGENT CARE | Facility: URGENT CARE | Age: 32
End: 2022-05-03
Payer: COMMERCIAL

## 2022-05-03 VITALS
TEMPERATURE: 101.5 F | DIASTOLIC BLOOD PRESSURE: 58 MMHG | OXYGEN SATURATION: 100 % | HEART RATE: 119 BPM | SYSTOLIC BLOOD PRESSURE: 104 MMHG | RESPIRATION RATE: 20 BRPM

## 2022-05-03 DIAGNOSIS — Z20.822 SUSPECTED 2019 NOVEL CORONAVIRUS INFECTION: Primary | ICD-10-CM

## 2022-05-03 PROCEDURE — U0003 INFECTIOUS AGENT DETECTION BY NUCLEIC ACID (DNA OR RNA); SEVERE ACUTE RESPIRATORY SYNDROME CORONAVIRUS 2 (SARS-COV-2) (CORONAVIRUS DISEASE [COVID-19]), AMPLIFIED PROBE TECHNIQUE, MAKING USE OF HIGH THROUGHPUT TECHNOLOGIES AS DESCRIBED BY CMS-2020-01-R: HCPCS | Performed by: STUDENT IN AN ORGANIZED HEALTH CARE EDUCATION/TRAINING PROGRAM

## 2022-05-03 PROCEDURE — 99213 OFFICE O/P EST LOW 20 MIN: CPT | Mod: CS | Performed by: STUDENT IN AN ORGANIZED HEALTH CARE EDUCATION/TRAINING PROGRAM

## 2022-05-03 PROCEDURE — U0005 INFEC AGEN DETEC AMPLI PROBE: HCPCS | Performed by: STUDENT IN AN ORGANIZED HEALTH CARE EDUCATION/TRAINING PROGRAM

## 2022-05-04 ENCOUNTER — TELEPHONE (OUTPATIENT)
Dept: NURSING | Facility: CLINIC | Age: 32
End: 2022-05-04
Payer: COMMERCIAL

## 2022-05-04 ENCOUNTER — TELEPHONE (OUTPATIENT)
Dept: OBGYN | Facility: CLINIC | Age: 32
End: 2022-05-04
Payer: COMMERCIAL

## 2022-05-04 DIAGNOSIS — O98.512 COVID-19 AFFECTING PREGNANCY IN SECOND TRIMESTER: Primary | ICD-10-CM

## 2022-05-04 DIAGNOSIS — U07.1 COVID-19 AFFECTING PREGNANCY IN SECOND TRIMESTER: Primary | ICD-10-CM

## 2022-05-04 LAB — SARS-COV-2 RNA RESP QL NAA+PROBE: POSITIVE

## 2022-05-04 NOTE — TELEPHONE ENCOUNTER
Patient calling:  Had a + covid home test 5/3/22.  Was seen in urgent care yesterday. PCR pending  Symptoms started yesterday am.  Runny nose, body aches, cough , chills.  OTC meds reviewed.  Has pulse ox at home.  She will send message when PCR results return.  Will refer for antivirals at that time.  Can place order for [facility] US at that time.  Lolita Higgins RN

## 2022-05-04 NOTE — TELEPHONE ENCOUNTER
Covid PCR +.  Pt notified.  Advised to go to my chart to schedule a Covid [place] virtual visit.  M level II ultrasound referral placed.  Lolita Higgins RN

## 2022-05-04 NOTE — TELEPHONE ENCOUNTER
Coronavirus (COVID-19) Notification    Caller Name (Patient, parent, daughter/son, grandparent, etc)  The patient      Reason for call  Notify of Positive Coronavirus (COVID-19) lab results, assess symptoms,  review [facility] recommendations    Lab Result    Lab test:  2019-nCoV rRt-PCR or SARS-CoV-2 PCR    Oropharyngeal AND/OR nasopharyngeal swabs is POSITIVE for 2019-nCoV RNA/SARS-COV-2 PCR (COVID-19 virus)      Gather patient reported symptoms   Assessment   Current Symptoms at time of phone call, reported by patient: (if no symptoms, document: No symptoms] Nausea, headache body aches, chills, runny nose, loss of smell and taste   Date of symptom(s) onset (if applicable) 2 days ago     If at time of call, Patients symptoms have worsened, the Patient should contact 911 or have someone drive them to Emergency Dept promptly:      If Patient calling 911, inform 911 personal that you have tested positive for the Coronavirus (COVID-19).  Place mask on and await 911 to arrive.    If Emergency Dept, If possible, please have another adult drive you to the Emergency Dept but you need to wear mask when in contact with other people.      Treatment Options:   Patient classified as COVID treatment eligible by Epic high risk algorithm: Yes  Is the patient symptomatic at the time of result notification? Yes. Was the onset of symptoms within the last 5 days? Yes.   There are now oral medications available for the treatment of COVID-19.  Taking one of these medications within the first five days of symptoms (when people may not yet feel severely ill) has been shown to make people feel better, prevent them from getting sicker, and preventing hospitalization and death.   Does the patient agree to have a visit with a provider to discuss treatment options? Yes. Is the patient seen at [facility]?  No: Warm transfer caller to [phone] to be scheduled with a virtual urgent provider.  During transfer, instruct   on appropriate time frame for visit Spoke to an OB nurse today regarding the positive Covid test and was informed to scheduled the treatment through an e-visit.    Review information with Patient    Your result was positive. This means you have COVID-19 (coronavirus).    How can I protect others?    These guidelines are for isolating before returning to work, school or .    If you DO have symptoms    Stay home and away from others     For at least 5 days after your symptoms started, AND    You are fever free for 24 hours (with no medicine that reduces fever), AND    Your other symptoms are better    Wear a mask for 10 full days anytime you are around others    If you DON'T have symptoms    Stay home and away from others for at least 5 days after your positive test    Wear a mask for 10 full days anytime you are around others    There may be different guidelines for healthcare facilities.  Please check with the specific sites before arriving.    If you have been told by a doctor that you were severely ill with COVID-19 or are immunocompromised, you should isolate for at least 10 days.    You should not go back to work until you meet the guidelines above for ending your home isolation. You don't need to be retested for COVID-19 before going back to work--studies show that you won't spread the virus if it's been at least 10 days since your symptoms started (or 20 days, if you have a weak immune system).    Employers, schools, and daycares: This is an official notice for this person's medical guidelines for returning in-person.  They must meet the above guidelines before going back to work, school or  in person.    You will receive a positive COVID-19 letter via [employer] or the mail soon with additional self-care information (exception, no letters will be sent to presurgical/preprocedure patients).    Would you like me to review some of that information with you now?  No    If you were tested for  an upcoming procedure, please contact your provider for next steps.    Lesli Hernandez LPN

## 2022-05-04 NOTE — PROGRESS NOTES
"The patient has been notified of following:     \"This telephone visit will be conducted via a call between you and your physician/provider. We have found that certain health care needs can be provided without the need for a physical exam.  This service lets us provide the care you need with a short phone conversation.  If a prescription is necessary we can send it directly to your pharmacy.  If lab work is needed we can place an order for that and you can then stop by our lab to have the test done at a later time.    Telephone visits are billed at different rates depending on your insurance coverage. During this emergency period, for some insurers they may be billed the same as an in-person visit.  Please reach out to your insurance provider with any questions.    If during the course of the call the physician/provider feels a telephone visit is not appropriate, you will not be charged for this service.\"      Assessment & Plan:     Given her abdominal pain and back pain at 14 weeks gestation I did recommend she go to the emergency department for evaluation and potential brief OB monitoring.  She expressed understanding but feels that her symptoms are mild enough that she can wait for now.  She reports she lives close to the [facility] ED and will call if anything changes.  She was COVID swabbed while in clinic and these results are pending at this time.  At the end of the encounter, I discussed diagnosis & medications. Discussed red flags for being seen in person at clinic/ER, as well as indications for follow up if no improvement. Patient understood and agreed to plan.       ICD-10-CM    1. Suspected 2019 novel coronavirus infection  Z20.822 Symptomatic; Yes; 5/3/2022 COVID-19 Virus (Coronavirus) by PCR Nose         No follow-ups on file.    Phone Call Duration : 8  minutes    Rama Shrestha MD  [facility]  -----------------------------------------------------------------------------------------------------------------------------------------------------------------    Subjective:   Agustin Ortega is a 31 year old female who is contacted via telephone through scheduled urgent care virtual visit to discuss:   Chief Complaint   Patient presents with     Cough     Body aches, chills, runny nose, positive at home covid today, need confirmation on covid for infusion therapy       The patient started feeling unwell at 11:00 this morning, about 8 hours ago.  She is 14 weeks pregnant and has been struggling with persistent nausea and vomiting.  She is now scheduled for IV Zofran and normal saline infusions, she has required 3 in the last 4 weeks.  In addition to her usual nausea and vomiting she also developed low back pain and a slight bellyache.  She currently rates her back pain 4 out of 10 and her abdominal pain fairly bothersome.  She took an at home COVID test which was positive today.  She called OB triage who recommended she come in for COVID PCR testing to determine how best to proceed with her normal saline infusions.    Past Medical History:   Diagnosis Date     No pertinent past medical history 03/02/2022         Objective:   Gen:  NAD   Pulm: non-labored work of breathing, able to speak in full sentences     No results found for any visits on 05/03/22.    There are no Patient Instructions on file for this visit.

## 2022-05-05 ENCOUNTER — TRANSCRIBE ORDERS (OUTPATIENT)
Dept: MATERNAL FETAL MEDICINE | Facility: CLINIC | Age: 32
End: 2022-05-05
Payer: COMMERCIAL

## 2022-05-05 DIAGNOSIS — O26.90 PREGNANCY RELATED CONDITION, ANTEPARTUM: Primary | ICD-10-CM

## 2022-05-08 ENCOUNTER — HOSPITAL ENCOUNTER (EMERGENCY)
Facility: CLINIC | Age: 32
Discharge: HOME | End: 2022-05-08
Attending: EMERGENCY MEDICINE | Admitting: EMERGENCY MEDICINE
Payer: COMMERCIAL

## 2022-05-08 VITALS
HEART RATE: 68 BPM | DIASTOLIC BLOOD PRESSURE: 75 MMHG | SYSTOLIC BLOOD PRESSURE: 102 MMHG | WEIGHT: 167.99 LBS | TEMPERATURE: 98.7 F | RESPIRATION RATE: 18 BRPM | BODY MASS INDEX: 31.74 KG/M2 | OXYGEN SATURATION: 100 %

## 2022-05-08 DIAGNOSIS — U07.1 INFECTION DUE TO 2019 NOVEL CORONAVIRUS: ICD-10-CM

## 2022-05-08 DIAGNOSIS — E86.0 DEHYDRATION: ICD-10-CM

## 2022-05-08 DIAGNOSIS — O21.0 HYPEREMESIS GRAVIDARUM: ICD-10-CM

## 2022-05-08 LAB
ALBUMIN UR-MCNC: 50 MG/DL
ANION GAP SERPL CALCULATED.3IONS-SCNC: 8 MMOL/L (ref 3–14)
APPEARANCE UR: ABNORMAL
BACTERIA #/AREA URNS HPF: ABNORMAL /HPF
BASOPHILS # BLD AUTO: 0 10E3/UL (ref 0–0.2)
BASOPHILS NFR BLD AUTO: 0 %
BILIRUB UR QL STRIP: NEGATIVE
BUN SERPL-MCNC: 5 MG/DL (ref 7–30)
CALCIUM SERPL-MCNC: 8.8 MG/DL (ref 8.5–10.1)
CHLORIDE BLD-SCNC: 107 MMOL/L (ref 94–109)
CO2 SERPL-SCNC: 22 MMOL/L (ref 20–32)
COLOR UR AUTO: YELLOW
CREAT SERPL-MCNC: 0.26 MG/DL (ref 0.52–1.04)
EOSINOPHIL # BLD AUTO: 0 10E3/UL (ref 0–0.7)
EOSINOPHIL NFR BLD AUTO: 1 %
ERYTHROCYTE [DISTWIDTH] IN BLOOD BY AUTOMATED COUNT: 13 % (ref 10–15)
GFR SERPL CREATININE-BSD FRML MDRD: >90 ML/MIN/1.73M2
GLUCOSE BLD-MCNC: 82 MG/DL (ref 70–99)
GLUCOSE UR STRIP-MCNC: NEGATIVE MG/DL
HCT VFR BLD AUTO: 34.6 % (ref 35–47)
HGB BLD-MCNC: 11.6 G/DL (ref 11.7–15.7)
HGB UR QL STRIP: NEGATIVE
IMM GRANULOCYTES # BLD: 0 10E3/UL
IMM GRANULOCYTES NFR BLD: 1 %
KETONES UR STRIP-MCNC: >150 MG/DL
LEUKOCYTE ESTERASE UR QL STRIP: ABNORMAL
LYMPHOCYTES # BLD AUTO: 1 10E3/UL (ref 0.8–5.3)
LYMPHOCYTES NFR BLD AUTO: 33 %
MAGNESIUM SERPL-MCNC: 1.7 MG/DL (ref 1.6–2.3)
MCH RBC QN AUTO: 28 PG (ref 26.5–33)
MCHC RBC AUTO-ENTMCNC: 33.5 G/DL (ref 31.5–36.5)
MCV RBC AUTO: 84 FL (ref 78–100)
MONOCYTES # BLD AUTO: 0.2 10E3/UL (ref 0–1.3)
MONOCYTES NFR BLD AUTO: 6 %
MUCOUS THREADS #/AREA URNS LPF: PRESENT /LPF
NEUTROPHILS # BLD AUTO: 1.8 10E3/UL (ref 1.6–8.3)
NEUTROPHILS NFR BLD AUTO: 59 %
NITRATE UR QL: NEGATIVE
NRBC # BLD AUTO: 0 10E3/UL
NRBC BLD AUTO-RTO: 0 /100
PH UR STRIP: 6 [PH] (ref 5–7)
PLATELET # BLD AUTO: 164 10E3/UL (ref 150–450)
POTASSIUM BLD-SCNC: 3.6 MMOL/L (ref 3.4–5.3)
RBC # BLD AUTO: 4.14 10E6/UL (ref 3.8–5.2)
RBC URINE: 1 /HPF
SODIUM SERPL-SCNC: 137 MMOL/L (ref 133–144)
SP GR UR STRIP: 1.03 (ref 1–1.03)
SQUAMOUS EPITHELIAL: 40 /HPF
UROBILINOGEN UR STRIP-MCNC: NORMAL MG/DL
WBC # BLD AUTO: 3.1 10E3/UL (ref 4–11)
WBC URINE: 5 /HPF

## 2022-05-08 PROCEDURE — 85025 COMPLETE CBC W/AUTO DIFF WBC: CPT | Performed by: EMERGENCY MEDICINE

## 2022-05-08 PROCEDURE — 96361 HYDRATE IV INFUSION ADD-ON: CPT

## 2022-05-08 PROCEDURE — 83735 ASSAY OF MAGNESIUM: CPT | Performed by: EMERGENCY MEDICINE

## 2022-05-08 PROCEDURE — 250N000011 HC RX IP 250 OP 636: Performed by: EMERGENCY MEDICINE

## 2022-05-08 PROCEDURE — 99284 EMERGENCY DEPT VISIT MOD MDM: CPT | Mod: 25

## 2022-05-08 PROCEDURE — 36415 COLL VENOUS BLD VENIPUNCTURE: CPT | Performed by: EMERGENCY MEDICINE

## 2022-05-08 PROCEDURE — 96375 TX/PRO/DX INJ NEW DRUG ADDON: CPT

## 2022-05-08 PROCEDURE — 96374 THER/PROPH/DIAG INJ IV PUSH: CPT

## 2022-05-08 PROCEDURE — 258N000003 HC RX IP 258 OP 636: Performed by: EMERGENCY MEDICINE

## 2022-05-08 PROCEDURE — 80048 BASIC METABOLIC PNL TOTAL CA: CPT | Performed by: EMERGENCY MEDICINE

## 2022-05-08 PROCEDURE — 81001 URINALYSIS AUTO W/SCOPE: CPT | Performed by: EMERGENCY MEDICINE

## 2022-05-08 RX ORDER — ONDANSETRON 2 MG/ML
4 INJECTION INTRAMUSCULAR; INTRAVENOUS ONCE
Status: COMPLETED | OUTPATIENT
Start: 2022-05-08 | End: 2022-05-08

## 2022-05-08 RX ORDER — ONDANSETRON 2 MG/ML
4 INJECTION INTRAMUSCULAR; INTRAVENOUS EVERY 30 MIN PRN
Status: DISCONTINUED | OUTPATIENT
Start: 2022-05-08 | End: 2022-05-08 | Stop reason: HOSPADM

## 2022-05-08 RX ADMIN — SODIUM CHLORIDE, POTASSIUM CHLORIDE, SODIUM LACTATE AND CALCIUM CHLORIDE 1000 ML: 600; 310; 30; 20 INJECTION, SOLUTION INTRAVENOUS at 12:50

## 2022-05-08 RX ADMIN — ONDANSETRON 4 MG: 2 INJECTION INTRAMUSCULAR; INTRAVENOUS at 12:52

## 2022-05-08 RX ADMIN — SODIUM CHLORIDE, POTASSIUM CHLORIDE, SODIUM LACTATE AND CALCIUM CHLORIDE 1000 ML: 600; 310; 30; 20 INJECTION, SOLUTION INTRAVENOUS at 14:26

## 2022-05-08 RX ADMIN — ONDANSETRON 4 MG: 2 INJECTION INTRAMUSCULAR; INTRAVENOUS at 16:21

## 2022-05-08 ASSESSMENT — ENCOUNTER SYMPTOMS
VOMITING: 1
COUGH: 1
NAUSEA: 1
ABDOMINAL PAIN: 1
MYALGIAS: 1
FEVER: 1
CONSTIPATION: 1

## 2022-05-08 NOTE — ED PROVIDER NOTES
History     Chief Complaint:  Dizziness      HPI   Agustin Ortega is a 31 year old female  at 15 weeks who presents with dizziness and n/v.  She has history of hyperemesis gravidarum and had been getting IVF infusions at the infusion center every other day for her symptoms.  However, she began to have cough, congestion, fever on Wednesday this past week (four days ago) and tested positive for covid at that time.  Because of this she has not been able go to her infusions.  She has tried to manage at home with reglan and zofran, but those don't relieve her symptoms and she was getting too dizzy and weak today.  She also has been having brief twinges of mild lower abd pain since yesterday, but nothing severe and no associated vaginal bleeding or discharge.  +constipation.  No dysuria.      Review of Systems   Constitutional: Positive for fever.   HENT: Positive for congestion.    Respiratory: Positive for cough.    Gastrointestinal: Positive for abdominal pain, constipation, nausea and vomiting.   Musculoskeletal: Positive for myalgias.   All other systems reviewed and are negative.        Allergies:  No Known Allergies    Medications:    diphenhydrAMINE (BENADRYL) 50 MG tablet  metoclopramide (REGLAN) 5 MG tablet  ondansetron (ZOFRAN-ODT) 4 MG ODT tab  ondansetron (ZOFRAN-ODT) 4 MG ODT tab  Prenatal MV-Min-Fe Fum-FA-DHA (PRENATAL 1 PO)  scopolamine (TRANSDERM) 1 MG/3DAYS 72 hr patch         Past Medical History:   Past Surgical History:     Past Medical History:   Diagnosis Date     No pertinent past medical history 2022    Past Surgical History:   Procedure Laterality Date     wisdom teeth  2011      Patient Active Problem List    Diagnosis Date Noted     Nausea and vomiting in pregnancy prior to 22 weeks gestation 2022     Priority: Medium     Low vitamin D level 2022     Priority: Medium     Normal labor and delivery 2019     Priority: Medium     Encounter for triage in pregnant  patient 07/25/2019     Priority: Medium     Indication for care in labor or delivery 07/08/2019     Priority: Medium     GERD (gastroesophageal reflux disease) 03/06/2013     Priority: Medium     CARDIOVASCULAR SCREENING; LDL GOAL LESS THAN 160 03/06/2013     Priority: Medium          Family History  Family History   Problem Relation Age of Onset     Diabetes Father      Hypertension Father      Hyperlipidemia Father      Cerebrovascular Disease Father 60     Cancer Maternal Grandmother      Cancer Maternal Grandfather      Diabetes Brother 31        Type 2       Social History:  Presents to the ED with family by private vehicle    Physical Exam     Patient Vitals for the past 24 hrs:   BP Temp Temp src Pulse Resp SpO2 Weight   05/08/22 1540 102/75 -- -- 68 -- 100 % --   05/08/22 1200 104/76 -- -- 71 -- 99 % --   05/08/22 1145 117/76 -- -- 80 -- 100 % --   05/08/22 1126 124/85 98.7  F (37.1  C) Oral 93 18 100 % 76.2 kg (167 lb 15.9 oz)       Physical Exam  Vitals and nursing note reviewed.   Constitutional:       Appearance: Normal appearance.   HENT:      Head: Normocephalic and atraumatic.      Right Ear: External ear normal.      Left Ear: External ear normal.      Nose: Nose normal.      Mouth/Throat:      Mouth: Mucous membranes are dry.   Eyes:      Extraocular Movements: Extraocular movements intact.      Conjunctiva/sclera: Conjunctivae normal.   Cardiovascular:      Rate and Rhythm: Normal rate and regular rhythm.      Heart sounds: No murmur heard.  Pulmonary:      Effort: Pulmonary effort is normal. No respiratory distress.      Breath sounds: Normal breath sounds. No wheezing, rhonchi or rales.   Abdominal:      General: Abdomen is flat. Bowel sounds are normal. There is no distension.      Palpations: Abdomen is soft.      Tenderness: There is no abdominal tenderness. There is no guarding or rebound.   Musculoskeletal:         General: No deformity or signs of injury.      Cervical back: Normal range of  motion and neck supple.   Skin:     General: Skin is warm and dry.      Findings: No rash.   Neurological:      Mental Status: She is alert and oriented to person, place, and time.   Psychiatric:         Mood and Affect: Mood normal.         Behavior: Behavior normal.           Emergency Department Course       Imaging:  No orders to display       Laboratory:  Labs Ordered and Resulted from Time of ED Arrival to Time of ED Departure   BASIC METABOLIC PANEL - Abnormal       Result Value    Sodium 137      Potassium 3.6      Chloride 107      Carbon Dioxide (CO2) 22      Anion Gap 8      Urea Nitrogen 5 (*)     Creatinine 0.26 (*)     Calcium 8.8      Glucose 82      GFR Estimate >90     ROUTINE UA WITH MICROSCOPIC REFLEX TO CULTURE - Abnormal    Color Urine Yellow      Appearance Urine Slightly Cloudy (*)     Glucose Urine Negative      Bilirubin Urine Negative      Ketones Urine >150 (*)     Specific Gravity Urine 1.032      Blood Urine Negative      pH Urine 6.0      Protein Albumin Urine 50  (*)     Urobilinogen Urine Normal      Nitrite Urine Negative      Leukocyte Esterase Urine Trace (*)     Bacteria Urine Few (*)     Mucus Urine Present (*)     RBC Urine 1      WBC Urine 5      Squamous Epithelials Urine 40 (*)    CBC WITH PLATELETS AND DIFFERENTIAL - Abnormal    WBC Count 3.1 (*)     RBC Count 4.14      Hemoglobin 11.6 (*)     Hematocrit 34.6 (*)     MCV 84      MCH 28.0      MCHC 33.5      RDW 13.0      Platelet Count 164      % Neutrophils 59      % Lymphocytes 33      % Monocytes 6      % Eosinophils 1      % Basophils 0      % Immature Granulocytes 1      NRBCs per 100 WBC 0      Absolute Neutrophils 1.8      Absolute Lymphocytes 1.0      Absolute Monocytes 0.2      Absolute Eosinophils 0.0      Absolute Basophils 0.0      Absolute Immature Granulocytes 0.0      Absolute NRBCs 0.0     MAGNESIUM - Normal    Magnesium 1.7             Emergency Department Course:       Reviewed:  I reviewed nursing notes,  vitals, past history and care everywhere    Interventions:  Medications   ondansetron (ZOFRAN) injection 4 mg (4 mg Intravenous Given 22 1252)   lactated ringers BOLUS 1,000 mL (0 mLs Intravenous Stopped 22 1426)   lactated ringers BOLUS 1,000 mL (1,000 mLs Intravenous New Bag 22 1426)       Disposition:  The patient was discharged to home.    Impression & Plan      Medical Decision Makin yo F with n/v, dizziness, weakness.  Likely from her hyperemesis gravidarum, poss exacerbated by her covid as well.   She is well appearing and non-toxic.   Has benign abd exam, no vaginal bleeding.  Will check electrolytes and UA, give IVF and zofran, and check FHT.      1606 Rechecked patient.  She is feeling much better after IVF and zofran and is tolerating PO.  Her labs were c/w dehydration but otherwise unremarkable.  FHT are 150's.  She is on day #6 since pos test (5/3 per chart) so no longer eligible for anti-virals.  Will discharge.  Discussed return precautions.     Covid-19  Agustin Ortega was evaluated during a global COVID-19 pandemic, which necessitated consideration that the patient might be at risk for infection with the SARS-CoV-2 virus that causes COVID-19.  Applicable protocols for evaluation were followed during the patient's care. COVID-19 was considered as part of the patient's evaluation.       Diagnosis:    ICD-10-CM    1. Hyperemesis gravidarum  O21.0    2. Dehydration  E86.0    3. Infection due to 2019 novel coronavirus  U07.1        Discharge Medications:  New Prescriptions    No medications on file              Cesar Triplett MD  [id number]

## 2022-05-08 NOTE — ED TRIAGE NOTES
Patient is 15 wks pregnant and has been getting infusion therapy at the cancer center for dehydration from vomiting.  Patient has been unable to receive those treatments because she tested positive for COVID last Wednesday, patient reports fevers, body aches, vomiting, weakness.      Triage Assessment     Row Name 05/08/22 1127       Triage Assessment (Adult)    Airway WDL WDL       Respiratory WDL    Respiratory WDL WDL       Skin Circulation/Temperature WDL    Skin Circulation/Temperature WDL WDL       Cardiac WDL    Cardiac WDL WDL       Peripheral/Neurovascular WDL    Peripheral Neurovascular WDL WDL       Cognitive/Neuro/Behavioral WDL    Cognitive/Neuro/Behavioral WDL WDL

## 2022-05-10 ENCOUNTER — TELEPHONE (OUTPATIENT)
Dept: OBGYN | Facility: CLINIC | Age: 32
End: 2022-05-10
Payer: COMMERCIAL

## 2022-05-10 PROBLEM — U07.1 COVID-19 AFFECTING PREGNANCY IN SECOND TRIMESTER: Status: ACTIVE | Noted: 2022-05-10

## 2022-05-10 PROBLEM — O98.512 COVID-19 AFFECTING PREGNANCY IN SECOND TRIMESTER: Status: ACTIVE | Noted: 2022-05-10

## 2022-05-10 NOTE — TELEPHONE ENCOUNTER
Pt calling.  15w3d  Pos COVID 5/3.     States that she was in the ED on 5/8 because she was dizzy and having some stomach pains. She was given IV fluids in the ED.    Pt is scheduled for IV infusion on 5/13/22. She is going to call the infusion center to see if she can keep that appt.       Advised:  Rest  Fluids  Eating every 1-2 hrs.     She will call back with any changes in her symptoms.     Karine CAVAZOS RN

## 2022-05-11 ENCOUNTER — HOSPITAL ENCOUNTER (EMERGENCY)
Facility: CLINIC | Age: 32
Discharge: HOME | End: 2022-05-12
Attending: EMERGENCY MEDICINE | Admitting: EMERGENCY MEDICINE
Payer: COMMERCIAL

## 2022-05-11 ENCOUNTER — APPOINTMENT (OUTPATIENT)
Dept: ULTRASOUND IMAGING | Facility: CLINIC | Age: 32
End: 2022-05-11
Attending: EMERGENCY MEDICINE
Payer: COMMERCIAL

## 2022-05-11 DIAGNOSIS — O21.0 HYPEREMESIS GRAVIDARUM: ICD-10-CM

## 2022-05-11 LAB
ALBUMIN UR-MCNC: 70 MG/DL
ANION GAP SERPL CALCULATED.3IONS-SCNC: 9 MMOL/L (ref 3–14)
APPEARANCE UR: CLEAR
B-HCG SERPL-ACNC: ABNORMAL IU/L (ref 0–5)
BASOPHILS # BLD AUTO: 0 10E3/UL (ref 0–0.2)
BASOPHILS NFR BLD AUTO: 0 %
BILIRUB UR QL STRIP: NEGATIVE
BUN SERPL-MCNC: 5 MG/DL (ref 7–30)
CALCIUM SERPL-MCNC: 8.8 MG/DL (ref 8.5–10.1)
CHLORIDE BLD-SCNC: 106 MMOL/L (ref 94–109)
CO2 SERPL-SCNC: 20 MMOL/L (ref 20–32)
COLOR UR AUTO: YELLOW
CREAT SERPL-MCNC: 0.31 MG/DL (ref 0.52–1.04)
EOSINOPHIL # BLD AUTO: 0 10E3/UL (ref 0–0.7)
EOSINOPHIL NFR BLD AUTO: 0 %
ERYTHROCYTE [DISTWIDTH] IN BLOOD BY AUTOMATED COUNT: 12.9 % (ref 10–15)
GFR SERPL CREATININE-BSD FRML MDRD: >90 ML/MIN/1.73M2
GLUCOSE BLD-MCNC: 82 MG/DL (ref 70–99)
GLUCOSE UR STRIP-MCNC: NEGATIVE MG/DL
HCT VFR BLD AUTO: 36.8 % (ref 35–47)
HGB BLD-MCNC: 12.1 G/DL (ref 11.7–15.7)
HGB UR QL STRIP: NEGATIVE
HOLD SPECIMEN: NORMAL
IMM GRANULOCYTES # BLD: 0 10E3/UL
IMM GRANULOCYTES NFR BLD: 0 %
KETONES UR STRIP-MCNC: >150 MG/DL
LEUKOCYTE ESTERASE UR QL STRIP: NEGATIVE
LYMPHOCYTES # BLD AUTO: 1.3 10E3/UL (ref 0.8–5.3)
LYMPHOCYTES NFR BLD AUTO: 26 %
MCH RBC QN AUTO: 27.6 PG (ref 26.5–33)
MCHC RBC AUTO-ENTMCNC: 32.9 G/DL (ref 31.5–36.5)
MCV RBC AUTO: 84 FL (ref 78–100)
MONOCYTES # BLD AUTO: 0.2 10E3/UL (ref 0–1.3)
MONOCYTES NFR BLD AUTO: 5 %
MUCOUS THREADS #/AREA URNS LPF: PRESENT /LPF
NEUTROPHILS # BLD AUTO: 3.4 10E3/UL (ref 1.6–8.3)
NEUTROPHILS NFR BLD AUTO: 69 %
NITRATE UR QL: NEGATIVE
NRBC # BLD AUTO: 0 10E3/UL
NRBC BLD AUTO-RTO: 0 /100
PH UR STRIP: 6 [PH] (ref 5–7)
PLATELET # BLD AUTO: 184 10E3/UL (ref 150–450)
POTASSIUM BLD-SCNC: 3.4 MMOL/L (ref 3.4–5.3)
RBC # BLD AUTO: 4.39 10E6/UL (ref 3.8–5.2)
RBC URINE: 2 /HPF
SODIUM SERPL-SCNC: 135 MMOL/L (ref 133–144)
SP GR UR STRIP: 1.03 (ref 1–1.03)
SQUAMOUS EPITHELIAL: 4 /HPF
UROBILINOGEN UR STRIP-MCNC: NORMAL MG/DL
WBC # BLD AUTO: 4.9 10E3/UL (ref 4–11)
WBC URINE: 3 /HPF

## 2022-05-11 PROCEDURE — 250N000013 HC RX MED GY IP 250 OP 250 PS 637: Performed by: EMERGENCY MEDICINE

## 2022-05-11 PROCEDURE — 258N000003 HC RX IP 258 OP 636: Performed by: EMERGENCY MEDICINE

## 2022-05-11 PROCEDURE — 99285 EMERGENCY DEPT VISIT HI MDM: CPT | Mod: 25

## 2022-05-11 PROCEDURE — 36415 COLL VENOUS BLD VENIPUNCTURE: CPT | Performed by: EMERGENCY MEDICINE

## 2022-05-11 PROCEDURE — 96361 HYDRATE IV INFUSION ADD-ON: CPT

## 2022-05-11 PROCEDURE — 85025 COMPLETE CBC W/AUTO DIFF WBC: CPT | Performed by: EMERGENCY MEDICINE

## 2022-05-11 PROCEDURE — 82310 ASSAY OF CALCIUM: CPT | Performed by: EMERGENCY MEDICINE

## 2022-05-11 PROCEDURE — 84702 CHORIONIC GONADOTROPIN TEST: CPT | Performed by: EMERGENCY MEDICINE

## 2022-05-11 PROCEDURE — 250N000011 HC RX IP 250 OP 636: Performed by: EMERGENCY MEDICINE

## 2022-05-11 PROCEDURE — 80048 BASIC METABOLIC PNL TOTAL CA: CPT | Performed by: EMERGENCY MEDICINE

## 2022-05-11 PROCEDURE — 96374 THER/PROPH/DIAG INJ IV PUSH: CPT

## 2022-05-11 PROCEDURE — 81001 URINALYSIS AUTO W/SCOPE: CPT | Performed by: EMERGENCY MEDICINE

## 2022-05-11 PROCEDURE — 76805 OB US >/= 14 WKS SNGL FETUS: CPT

## 2022-05-11 RX ORDER — ONDANSETRON 2 MG/ML
4 INJECTION INTRAMUSCULAR; INTRAVENOUS EVERY 30 MIN PRN
Status: DISCONTINUED | OUTPATIENT
Start: 2022-05-11 | End: 2022-05-12 | Stop reason: HOSPADM

## 2022-05-11 RX ORDER — ACETAMINOPHEN 500 MG
500 TABLET ORAL ONCE
Status: COMPLETED | OUTPATIENT
Start: 2022-05-11 | End: 2022-05-11

## 2022-05-11 RX ADMIN — SODIUM CHLORIDE 1000 ML: 9 INJECTION, SOLUTION INTRAVENOUS at 20:50

## 2022-05-11 RX ADMIN — ACETAMINOPHEN 500 MG: 500 TABLET, FILM COATED ORAL at 22:53

## 2022-05-11 RX ADMIN — ONDANSETRON 4 MG: 2 INJECTION INTRAMUSCULAR; INTRAVENOUS at 22:50

## 2022-05-11 RX ADMIN — SODIUM CHLORIDE, POTASSIUM CHLORIDE, SODIUM LACTATE AND CALCIUM CHLORIDE 1000 ML: 600; 310; 30; 20 INJECTION, SOLUTION INTRAVENOUS at 22:52

## 2022-05-11 ASSESSMENT — ENCOUNTER SYMPTOMS
NAUSEA: 1
ABDOMINAL PAIN: 1
CHILLS: 0
SHORTNESS OF BREATH: 1
FEVER: 0
MYALGIAS: 1
VOMITING: 1
DIZZINESS: 1
WEAKNESS: 1

## 2022-05-12 VITALS
BODY MASS INDEX: 30.59 KG/M2 | HEIGHT: 61 IN | RESPIRATION RATE: 18 BRPM | HEART RATE: 77 BPM | WEIGHT: 162.04 LBS | SYSTOLIC BLOOD PRESSURE: 131 MMHG | DIASTOLIC BLOOD PRESSURE: 78 MMHG | OXYGEN SATURATION: 98 % | TEMPERATURE: 98 F

## 2022-05-12 PROCEDURE — 96375 TX/PRO/DX INJ NEW DRUG ADDON: CPT

## 2022-05-12 PROCEDURE — 250N000011 HC RX IP 250 OP 636: Performed by: EMERGENCY MEDICINE

## 2022-05-12 PROCEDURE — 96376 TX/PRO/DX INJ SAME DRUG ADON: CPT

## 2022-05-12 PROCEDURE — 258N000001 HC RX 258: Performed by: EMERGENCY MEDICINE

## 2022-05-12 RX ORDER — DEXTROSE MONOHYDRATE 25 G/50ML
50 INJECTION, SOLUTION INTRAVENOUS ONCE
Status: COMPLETED | OUTPATIENT
Start: 2022-05-12 | End: 2022-05-12

## 2022-05-12 RX ADMIN — ONDANSETRON 4 MG: 2 INJECTION INTRAMUSCULAR; INTRAVENOUS at 00:16

## 2022-05-12 RX ADMIN — DEXTROSE MONOHYDRATE 50 ML: 25 INJECTION, SOLUTION INTRAVENOUS at 00:10

## 2022-05-12 NOTE — ED TRIAGE NOTES
Here for concern of n/v past 2 days, unable to keep fluids down assocaited with generalized weakness, dizziness, and white vaginal discharge, and abdominal cramp. Is currently 15-16 weeks pregnant. Stated normally gets her infusion twice a week for hyperemesis gavidum, but unable to go due to her covid positive test on 5/3. Was seen here on 5/8 for same complaints. ABCs intact.     Triage Assessment     Row Name 05/11/22 1930       Triage Assessment (Adult)    Airway WDL WDL       Respiratory WDL    Respiratory WDL WDL       Cardiac WDL    Cardiac WDL WDL

## 2022-05-12 NOTE — ED PROVIDER NOTES
"  History   Chief Complaint:  Nausea and vomiting     The history is provided by the patient.      Agustin Ortega is a 31 year old female who is 16 weeks pregnant, , with history of hyperemesis gravidarum presenting with nausea, vomiting, weakness, dizziness, white vaginal discharge, and an abdominal cramp. The patient came in on  (22) as well for dizziness. At the time, she provided that she had been getting IVF infusions every other day to help her symptoms. It was noted that she was tested positive for COVID on 5/3/2022 and had been unable to continue her infusion after this. The patient threw up more than 12 times today, and feels mostly fine now except for some soreness. She describes her abdominal pain as intermittent and worsening. The patient has body aches and some minor shortness of breath, but denies any fever or chills. Taking Tylenol at home was helping. She is not on Paxlovid.    Review of Systems   Constitutional: Negative for chills and fever.   Respiratory: Positive for shortness of breath.    Gastrointestinal: Positive for abdominal pain, nausea and vomiting.   Genitourinary: Positive for vaginal discharge.   Musculoskeletal: Positive for myalgias.   Neurological: Positive for dizziness and weakness.     Allergies:  The patient has no known allergies.     Medications:  Benadryl  Reglan  Zofran  Prenatal  Transderm    Past Medical History:     GERD  Hyperemesis gravidarum    Past Surgical History:    [place] teeth extraction     Family History:    Hypertension  Hyperlipidemia  Cerebrovascular disease  Type II diabetes    Social History:  Patient came from home.  Patient is unaccompanied in the ED.    Physical Exam     Patient Vitals for the past 24 hrs:   BP Temp Temp src Pulse Resp SpO2 Height Weight   22 0040 -- -- -- -- -- 98 % -- --   22 0035 131/78 -- -- 77 -- -- -- --   22 1933 116/80 98  F (36.7  C) Temporal 86 18 97 % 1.549 m (5' 1\") 73.5 kg (162 lb 0.6 oz) "       Physical Exam  General: Patient is awake, alert and interactive when I enter the room  Head: The scalp, face, and head appear normal  Eyes: The pupils are equal, round, and reactive to light. Conjunctivae and sclerae are normal  ENT: External acoustic canals are normal. The oropharynx is normal without erythema. Uvula is in the midline  Neck: Normal range of motion.   CV: Regular rate and rhythm.   Resp: Lungs are clear without wheezes or rales. No respiratory distress.   GI: Abdomen is soft, no rigidity, guarding, or rebound. No distension. No tenderness to palpation in any quadrant.     MS: Normal tone. Joints grossly normal without effusions. No asymmetric leg swelling, calf or thigh tenderness.    Skin: No rash or lesions noted. Normal capillary refill noted  Neuro: Speech is normal and fluent. Face is symmetric. Moving all extremities.   Psych:  Normal affect.  Appropriate interactions.      Emergency Department Course     Imaging:    US OB > 14 Weeks   Final Result   IMPRESSION:     1.  Single living intrauterine gestation.   2.  Based on first ultrasound, composite age of 15 weeks and 4 days with EDC 10/29/2022.   3.  Normal appearance of the maternal ovaries.   4.  Cervix largely obscured by cephalic fetal presentation.        Report per radiology    Laboratory:    Labs Ordered and Resulted from Time of ED Arrival to Time of ED Departure   BASIC METABOLIC PANEL - Abnormal       Result Value    Sodium 135      Potassium 3.4      Chloride 106      Carbon Dioxide (CO2) 20      Anion Gap 9      Urea Nitrogen 5 (*)     Creatinine 0.31 (*)     Calcium 8.8      Glucose 82      GFR Estimate >90     HCG QUANTITATIVE PREGNANCY - Abnormal    hCG Quantitative 21,858 (*)    ROUTINE UA WITH MICROSCOPIC REFLEX TO CULTURE - Abnormal    Color Urine Yellow      Appearance Urine Clear      Glucose Urine Negative      Bilirubin Urine Negative      Ketones Urine >150 (*)     Specific Gravity Urine 1.034      Blood Urine  Negative      pH Urine 6.0      Protein Albumin Urine 70  (*)     Urobilinogen Urine Normal      Nitrite Urine Negative      Leukocyte Esterase Urine Negative      Mucus Urine Present (*)     RBC Urine 2      WBC Urine 3      Squamous Epithelials Urine 4 (*)    CBC WITH PLATELETS AND DIFFERENTIAL    WBC Count 4.9      RBC Count 4.39      Hemoglobin 12.1      Hematocrit 36.8      MCV 84      MCH 27.6      MCHC 32.9      RDW 12.9      Platelet Count 184      % Neutrophils 69      % Lymphocytes 26      % Monocytes 5      % Eosinophils 0      % Basophils 0      % Immature Granulocytes 0      NRBCs per 100 WBC 0      Absolute Neutrophils 3.4      Absolute Lymphocytes 1.3      Absolute Monocytes 0.2      Absolute Eosinophils 0.0      Absolute Basophils 0.0      Absolute Immature Granulocytes 0.0      Absolute NRBCs 0.0        Emergency Department Course:       Reviewed:  I reviewed nursing notes, vitals, past medical history and Care Everywhere    Assessments:  2230 I obtained history and examined the patient as noted above.   0010 I rechecked the patient and explained findings.     Interventions:  2050 NS 1000 mL IV  2250 Zofran 4 mg IV  2252 Lactated ringers 1000 mL IV  2253 Tylenol 650 mg PO    Disposition:  The patient was discharged to home.     Impression & Plan     CMS Diagnoses: None    Medical Decision Making:  Agustin Ortega is a 31 year old female who presents for evaluation of nausea and vomiting.  Patient is currently 16 weeks pregnant and has experienced hyperemesis in this pregnancy and her previous pregnancy.  During this pregnancy she has been receiving outpatient IV infusions and has been using multiple antiemetics including Reglan, Compazine and Zofran with minimal effect.  She notes that she has been feeling quite a bit better after her IV infusions.  Unfortunately patient tested positive for COVID last week and has been unable to go to the infusion center and is presented to the emergency  department on a few occasions for IV infusions.  Today she reports that she has continued to have nausea and vomiting despite using Zofran at home.  Is unable to keep any food down.  Also notes some lower abdominal cramping which she equates to dehydration.  Nonetheless. I considered a broad differential diagnosis for this patient including viral gastroenteritis, food poisoning, bowel obstruction, intra-abdominal infection such as colitis, cholecystitis, UTI, pyelonephritis, appendicitis, etc.  There are no signs of worrisome intra-abdominal pathologies detected during the visit today.  The patient has a completely benign abdominal exam without rebound, guarding, or marked tenderness to palpation.  Patient was given IV Zofran, 2 L of IV fluid and some Tylenol with significant improvement of her symptoms.  She was able to eat some deion crackers while in the emergency department.  She notes feeling significantly better.  No evidence of infection.  Supportive outpatient management is therefore indicated.  Abdominal pain precautions are given for home.    It was discussed with the patient to return to the ED for blood in stool, increasing pain, or fevers more than 102.   Feels much improved after interventions in ED.  See above for medications for home.  I believe all of her symptoms are at this point explained by the pregnancy and hyperemesis gravidarum.  Patient is coming to the end of her quarantine for COVID and hopefully will be able to return to receiving her outpatient IV infusions.  I do not see any indication for admission at this time.      Diagnosis:    ICD-10-CM    1. Hyperemesis gravidarum  O21.0        Discharge Medications:  Discharge Medication List as of 5/12/2022 12:30 AM          Scribe Disclosure:  Namita DUBOIS Ismail, am serving as a scribe at 10:31 PM on 5/11/2022 to document services personally performed by Messi Vazquez MD based on my observations and the provider's statements  to me.     I, Yogi Dejesus, am serving as a scribe at 11:07 PM on 5/11/2022 to document services personally performed by Messi Vazquez MD based on my observations and the provider's statements to me.         Messi Vazquez MD  05/12/22 [id number]

## 2022-05-20 ENCOUNTER — PRENATAL OFFICE VISIT (OUTPATIENT)
Dept: MIDWIFE SERVICES | Facility: CLINIC | Age: 32
End: 2022-05-20
Payer: COMMERCIAL

## 2022-05-20 VITALS
BODY MASS INDEX: 30.85 KG/M2 | SYSTOLIC BLOOD PRESSURE: 100 MMHG | WEIGHT: 163.4 LBS | DIASTOLIC BLOOD PRESSURE: 62 MMHG | HEIGHT: 61 IN

## 2022-05-20 DIAGNOSIS — Z34.82 ENCOUNTER FOR SUPERVISION OF OTHER NORMAL PREGNANCY, SECOND TRIMESTER: Primary | ICD-10-CM

## 2022-05-20 DIAGNOSIS — O21.9 NAUSEA AND VOMITING IN PREGNANCY PRIOR TO 22 WEEKS GESTATION: ICD-10-CM

## 2022-05-20 PROCEDURE — 99207 PR PRENATAL VISIT: CPT | Performed by: ADVANCED PRACTICE MIDWIFE

## 2022-05-20 RX ORDER — ONDANSETRON 4 MG/1
8 TABLET, ORALLY DISINTEGRATING ORAL EVERY 8 HOURS PRN
Qty: 30 TABLET | Refills: 4 | Status: SHIPPED | OUTPATIENT
Start: 2022-05-20 | End: 2022-09-16

## 2022-05-20 NOTE — PROGRESS NOTES
"S:Feels a little better. Had Covid which exacerbated her nausea and vomiting. Plans to resume infusion therapy. Only zofran working. Worried about nutrition for baby. Has appointment for level II US in 2 weeks.  Fetal movement No  Denies loss of fluid/vb/contractions/pelvic pain  Depression screening done  O:  /62 (BP Location: Left arm, Cuff Size: Adult Regular)   Ht 1.549 m (5' 1\")   Wt 74.1 kg (163 lb 6.4 oz)   LMP 11/29/2021 (Within Weeks)   Breastfeeding No   BMI 30.87 kg/m    Exam:  Constitutional: healthy, alert and no distress  Respiratory: Respirations even and unlabored  Gastrointestinal: Abdomen soft, non-tender. Fundus measures appropriately for gestational age. Fetal heart tones heard easily.  Psychiatric: mentation appears normal and affect normal/bright  A:    Diagnosis Comments   1. Encounter for supervision of other normal pregnancy, second trimester       2. Nausea and vomiting in pregnancy prior to 22 weeks gestation  ondansetron (ZOFRAN ODT) 4 MG ODT tab, Nutrition Referral        P: Discussed options for quad screen today  declined quad screen today  Anatomy ultrasound next visit between 20-22 weeks  Return to clinic 4 weeks    WAN Boateng, COLE  "

## 2022-05-20 NOTE — NURSING NOTE
"Chief Complaint   Patient presents with     Prenatal Care     16w 6d, Covid positive on 5/3/2022. ED visits on  and 2022 for hyperemesis. Still has N&V.       Initial /62 (BP Location: Left arm, Cuff Size: Adult Regular)   Ht 1.549 m (5' 1\")   Wt 74.1 kg (163 lb 6.4 oz)   LMP 2021 (Within Weeks)   Breastfeeding No   BMI 30.87 kg/m   Estimated body mass index is 30.87 kg/m  as calculated from the following:    Height as of this encounter: 1.549 m (5' 1\").    Weight as of this encounter: 74.1 kg (163 lb 6.4 oz).  BP completed using cuff size: regular    Questioned patient about current smoking habits.  Pt. has never smoked.        "

## 2022-05-20 NOTE — PATIENT INSTRUCTIONS
"Genetic Screening in Pregnancy    There are several options you have for genetic screening in your pregnancy.  Everyone has their own personal reasons to screen or not to screen.  We want you to make the best choice for you and your pregnancy.  Below is a list of options, what they screen for and when the screening is done.  Genetic screening is recommended for women who are 35 and older at delivery and for those with a family history of chromosomal abnormalities. However, screening is offered to all women.    Innatal:      This is a screening for the more common chromosome abnormalities, including trisomy 21 (Down's syndrome), trisomy 18, trisomy 13 and sex chromosome abnormalities. This is a prenatal test that can be done as early as 10 weeks gestation.     A maternal blood sample is drawn that sequences cell-free DNA in maternal plasma. This in turn allows for molecular counting of chromosome copy numbers with a >99% detection rate of Down syndrome, a 97% detection rate of Trisomy 18, and a 78% detection rate of Trisomy 13.  This test will give information about the gender of the baby.  You can choose to not have that disclosed.     Results come back within 10-14 days.   About 5% of samples will have results that are designated as \"indeterminate\" or \"uninterruptable.\" With this type of result, genetic counseling and diagnostic testing are recommended. Remember this is a screening test and does not definitively diagnose or exclude the presence of these chromosome conditions.   This screening may or may not be covered by insurance.  We recommend you consult your insurance company to discuss.  Financial assistance is available at a low cost if not covered by your insurance.         Hemoglobin Electrophoresis:  Hemoglobin electrophoresis is a non-invasive blood test that looks at abnormal hemoglobin (component of red blood cells) production. Examples of this include sickle cell anemia (which is a disorder of the red " blood cells and their shape) and the thalassemia s (which is also a form of anemia).  High risk groups include:  , Southeast Asians, , Mediterranean, [place]onese, South and Central American and [place] descent. This blood test is usually done with your first OB labs.  This is a one-time test.      Trio Carrier Screen:   1.  Cystic Fibrosis (CF) is the most common life-shortening autosomal  recessive disease among  populations, with a frequency of 1 in  Every 3,500 live births. Although it mainly affects the   Population anyone can request screening. If you have a family history of  cystic fibrosis you should request this test.  This screening is a blood draw      2.  Spinal Muscular Atrophy (SMA) is the most common inherited cause  of infant death.  The most common form of this disorder causes death by a age two.  One in every 6,000 to 10,000 babies born in the  has SMA      3.  Fragile X Syndrome (FXS) is the most common inherited cause of  intellectual disability.  Approximately 1 in every 3,600 boys and 1 in every  6,000 girls is born with FXS      **If you are a carrier of CF or SMA, your partner will also need to be tested. This partner testing is offered free of charge.  This screen can be done prior to pregnancy or at any time during the pregnancy.      Quad Screen:    The quad screen is a quadruple marker screening test done during the second trimester of your pregnancy. This prenatal screening test that measures levels of four substances in a pregnant woman's blood; Alpha-fetoprotein (AFP), Human chorionic gonadotropin (HCG), Estriol, and Inhibin A.   AFP screens for open neural tube defects like Spina Bifida and Anencephaly.   Spina bifida is a serious birth defect that occurs when a portion of the neural tube fails to develop or close properly, causing defects in the spinal cord and in the bones of the spine.   Anencephaly is a serious birth defect in the closure of  the neural tube, resulting in an underdeveloped brain and an incomplete skull.   Human chorionic gonadotropin (HCG), Estriol, and Inhibin screen for Down syndrome (trisomy 21) and Trisomy 18.   Down syndrome is a chromosomal disorder that causes lifelong intellectual disability and developmental delays and, in some people, health problems  Trisomy 18 is a chromosomal disorder that causes severe developmental delays and anatomic abnormalities. It is often fatal by age 1.    The screening is ideally done between 15 and 18 weeks of pregnancy but can be done up to week 20. Even if you have done the [employer] testing you can still get a single AFP drawn to check for neural tube defects.       Screening Ultrasound:    This is a fetal survey done around 20 weeks gestation.  This screen will look at the cardiac activity, fetal heart rate and rhythm, assessment of the amniotic fluid volume, placenta appearance and location, and the umbilical cord vessel number and placental insertion site.  They also do many fetal measurements to make sure the baby is growing properly.  The cervical length is also measured and fetal movement is evaluated.  The sex of the baby can usually be determined.      In the event of a positive screen:    There are two diagnostic tests available if any screening tests come back with an increased risk.  You would first be referred to Maternal Fetal Medicine to be seen by a genetic counselor.  They would assess your risk and see if further diagnostic testing is warranted.  The options are; chorionic villus sampling (CVS), usually done at 11 to 12 weeks of pregnancy and amniocentesis which is generally done later in pregnancy around 15 to 20 weeks.  All risks/benefits would be explained and you can decide what course of action is best for you and your family.    Why you might choose to screen?  A desire to know as much as you can about your baby  If your baby had a genetic abnormality you can learn about it  before they are born  Choose whether to continue the pregnancy or to terminate    Why you might choose to NOT screen?  You feel that whatever happens is fine and you would not terminate  You know you don't want to do any diagnostic tests even if the screening test showed a high possibility of a genetic abnormality        For further information please call:  Charito Rodriguez  [phone]

## 2022-05-26 ENCOUNTER — PRE VISIT (OUTPATIENT)
Dept: MATERNAL FETAL MEDICINE | Facility: CLINIC | Age: 32
End: 2022-05-26
Payer: COMMERCIAL

## 2022-06-03 ENCOUNTER — HOSPITAL ENCOUNTER (OUTPATIENT)
Dept: ULTRASOUND IMAGING | Facility: CLINIC | Age: 32
Discharge: HOME | End: 2022-06-03
Attending: ADVANCED PRACTICE MIDWIFE
Payer: COMMERCIAL

## 2022-06-03 ENCOUNTER — OFFICE VISIT (OUTPATIENT)
Dept: MATERNAL FETAL MEDICINE | Facility: CLINIC | Age: 32
End: 2022-06-03
Attending: ADVANCED PRACTICE MIDWIFE
Payer: COMMERCIAL

## 2022-06-03 DIAGNOSIS — O26.90 PREGNANCY RELATED CONDITION, ANTEPARTUM: ICD-10-CM

## 2022-06-03 DIAGNOSIS — U07.1 COVID-19 AFFECTING PREGNANCY IN THIRD TRIMESTER: Primary | ICD-10-CM

## 2022-06-03 DIAGNOSIS — O98.513 COVID-19 AFFECTING PREGNANCY IN THIRD TRIMESTER: Primary | ICD-10-CM

## 2022-06-03 PROCEDURE — 76811 OB US DETAILED SNGL FETUS: CPT | Mod: 26 | Performed by: OBSTETRICS & GYNECOLOGY

## 2022-06-03 PROCEDURE — 76811 OB US DETAILED SNGL FETUS: CPT

## 2022-06-17 ENCOUNTER — PRENATAL OFFICE VISIT (OUTPATIENT)
Dept: MIDWIFE SERVICES | Facility: CLINIC | Age: 32
End: 2022-06-17
Payer: COMMERCIAL

## 2022-06-17 VITALS — WEIGHT: 163.6 LBS | DIASTOLIC BLOOD PRESSURE: 66 MMHG | BODY MASS INDEX: 30.91 KG/M2 | SYSTOLIC BLOOD PRESSURE: 102 MMHG

## 2022-06-17 DIAGNOSIS — R10.9 ABDOMINAL PAIN DURING PREGNANCY IN SECOND TRIMESTER: Primary | ICD-10-CM

## 2022-06-17 DIAGNOSIS — O26.892 ABDOMINAL PAIN DURING PREGNANCY IN SECOND TRIMESTER: Primary | ICD-10-CM

## 2022-06-17 LAB
ALBUMIN UR-MCNC: NEGATIVE MG/DL
APPEARANCE UR: CLEAR
BACTERIA #/AREA URNS HPF: ABNORMAL /HPF
BILIRUB UR QL STRIP: ABNORMAL
COLOR UR AUTO: YELLOW
GLUCOSE UR STRIP-MCNC: NEGATIVE MG/DL
HGB UR QL STRIP: ABNORMAL
KETONES UR STRIP-MCNC: 40 MG/DL
LEUKOCYTE ESTERASE UR QL STRIP: ABNORMAL
NITRATE UR QL: NEGATIVE
PH UR STRIP: 6.5 [PH] (ref 5–7)
RBC #/AREA URNS AUTO: ABNORMAL /HPF
SP GR UR STRIP: 1.02 (ref 1–1.03)
SQUAMOUS #/AREA URNS AUTO: ABNORMAL /LPF
UROBILINOGEN UR STRIP-ACNC: 1 E.U./DL
WBC #/AREA URNS AUTO: ABNORMAL /HPF

## 2022-06-17 PROCEDURE — 81001 URINALYSIS AUTO W/SCOPE: CPT | Performed by: ADVANCED PRACTICE MIDWIFE

## 2022-06-17 PROCEDURE — 87086 URINE CULTURE/COLONY COUNT: CPT | Performed by: ADVANCED PRACTICE MIDWIFE

## 2022-06-17 PROCEDURE — 99207 PR PRENATAL VISIT: CPT | Performed by: ADVANCED PRACTICE MIDWIFE

## 2022-06-17 NOTE — PROGRESS NOTES
S: Feels worse again. Had a week and a half where she felt much better. Was able to eat normally. Then this past weekend, started feeling nauseated and vomiting again. Having some intermittent abdominal pain. Unsure if from vomiting again.  Has started feeling fetal movement.  Denies uterine cramping, vaginal bleeding or leaking of fluid  O: Vitals: /66 (BP Location: Right arm, Cuff Size: Adult Regular)   Wt 74.2 kg (163 lb 9.6 oz)   LMP 11/29/2021 (Within Weeks)   Breastfeeding No   BMI 30.91 kg/m    BMI= Body mass index is 30.91 kg/m .  Exam:  Constitutional: healthy, alert and no distress  Respiratory: respirations even and unlabored  Gastrointestinal: Abdomen soft, non-tender. Fundus measures appropriate for gestational age. Fetal heart tones hear without difficulty and within normal limits  : Deferred  Psychiatric: mentation appears normal and affect normal/bright  A:    Diagnosis Comments   1. Abdominal pain during pregnancy in second trimester  UA with Microscopic reflex to Culture - Clinic Collect     Results for orders placed or performed in visit on 06/17/22   UA with Microscopic reflex to Culture - Clinic Collect     Status: Abnormal    Specimen: Urine, Midstream   Result Value Ref Range    Color Urine Yellow Colorless, Straw, Light Yellow, Yellow    Appearance Urine Clear Clear    Glucose Urine Negative Negative mg/dL    Bilirubin Urine Small (A) Negative    Ketones Urine 40  (A) Negative mg/dL    Specific Gravity Urine 1.020 1.003 - 1.035    Blood Urine Trace (A) Negative    pH Urine 6.5 5.0 - 7.0    Protein Albumin Urine Negative Negative mg/dL    Urobilinogen Urine 1.0 0.2, 1.0 E.U./dL    Nitrite Urine Negative Negative    Leukocyte Esterase Urine Small (A) Negative   Urine Microscopic     Status: Abnormal   Result Value Ref Range    Bacteria Urine Few (A) None Seen /HPF    RBC Urine 0-2 0-2 /HPF /HPF    WBC Urine 0-5 0-5 /HPF /HPF    Squamous Epithelials Urine Moderate (A) None Seen /LPF     Narrative    Urine Culture not indicated            ICD-10-CM    1. Abdominal pain during pregnancy in second trimester  O26.892 UA with Microscopic reflex to Culture - Clinic Collect    R10.9 Urine Microscopic     Urine Culture     P: UA reviewed Culture pending. Discussed that discomfort sounds like musculoskeletal pain from vomiting. Continue to monitor and call if symptoms persist or become worse. Discussed 20 week fetal screen.   Encouraged patient to call with any questions or concerns.      WAN Boateng, CNM

## 2022-06-17 NOTE — NURSING NOTE
"Chief Complaint   Patient presents with     Prenatal Care     20w 6d, reports she has started feeling movements. She reported that she has had RUQ abdominal pain and increase in N&V in the last week again, unsure if muscle soreness from vomiting. Reports lower abdominal/pelvic pain also in the last week.        Initial /66 (BP Location: Right arm, Cuff Size: Adult Regular)   Wt 74.2 kg (163 lb 9.6 oz)   LMP 2021 (Within Weeks)   Breastfeeding No   BMI 30.91 kg/m   Estimated body mass index is 30.91 kg/m  as calculated from the following:    Height as of 22: 1.549 m (5' 1\").    Weight as of this encounter: 74.2 kg (163 lb 9.6 oz).  BP completed using cuff size: regular    Questioned patient about current smoking habits.  Pt. has never smoked.        "

## 2022-06-19 LAB — BACTERIA UR CULT: NORMAL

## 2022-06-29 ENCOUNTER — TELEPHONE (OUTPATIENT)
Dept: MIDWIFE SERVICES | Facility: CLINIC | Age: 32
End: 2022-06-29

## 2022-06-29 ENCOUNTER — PRENATAL OFFICE VISIT (OUTPATIENT)
Dept: MIDWIFE SERVICES | Facility: CLINIC | Age: 32
End: 2022-06-29
Payer: COMMERCIAL

## 2022-06-29 VITALS
TEMPERATURE: 98.2 F | BODY MASS INDEX: 30.49 KG/M2 | RESPIRATION RATE: 14 BRPM | WEIGHT: 161.5 LBS | SYSTOLIC BLOOD PRESSURE: 110 MMHG | HEART RATE: 80 BPM | HEIGHT: 61 IN | DIASTOLIC BLOOD PRESSURE: 66 MMHG

## 2022-06-29 DIAGNOSIS — R10.11 ABDOMINAL PAIN, RIGHT UPPER QUADRANT: ICD-10-CM

## 2022-06-29 DIAGNOSIS — R10.9 ABDOMINAL PAIN IN PREGNANCY, SECOND TRIMESTER: Primary | ICD-10-CM

## 2022-06-29 DIAGNOSIS — O26.892 ABDOMINAL PAIN IN PREGNANCY, SECOND TRIMESTER: Primary | ICD-10-CM

## 2022-06-29 LAB
BASOPHILS # BLD AUTO: 0 10E3/UL (ref 0–0.2)
BASOPHILS NFR BLD AUTO: 0 %
EOSINOPHIL # BLD AUTO: 0 10E3/UL (ref 0–0.7)
EOSINOPHIL NFR BLD AUTO: 0 %
ERYTHROCYTE [DISTWIDTH] IN BLOOD BY AUTOMATED COUNT: 13.9 % (ref 10–15)
HCT VFR BLD AUTO: 31.8 % (ref 35–47)
HGB BLD-MCNC: 11 G/DL (ref 11.7–15.7)
IMM GRANULOCYTES # BLD: 0 10E3/UL
IMM GRANULOCYTES NFR BLD: 0 %
LYMPHOCYTES # BLD AUTO: 1.2 10E3/UL (ref 0.8–5.3)
LYMPHOCYTES NFR BLD AUTO: 26 %
MCH RBC QN AUTO: 29.2 PG (ref 26.5–33)
MCHC RBC AUTO-ENTMCNC: 34.6 G/DL (ref 31.5–36.5)
MCV RBC AUTO: 84 FL (ref 78–100)
MONOCYTES # BLD AUTO: 0.2 10E3/UL (ref 0–1.3)
MONOCYTES NFR BLD AUTO: 5 %
NEUTROPHILS # BLD AUTO: 3.1 10E3/UL (ref 1.6–8.3)
NEUTROPHILS NFR BLD AUTO: 68 %
PLATELET # BLD AUTO: 166 10E3/UL (ref 150–450)
RBC # BLD AUTO: 3.77 10E6/UL (ref 3.8–5.2)
WBC # BLD AUTO: 4.5 10E3/UL (ref 4–11)

## 2022-06-29 PROCEDURE — 99207 PR PRENATAL VISIT: CPT | Performed by: ADVANCED PRACTICE MIDWIFE

## 2022-06-29 PROCEDURE — 85025 COMPLETE CBC W/AUTO DIFF WBC: CPT | Performed by: ADVANCED PRACTICE MIDWIFE

## 2022-06-29 PROCEDURE — 83690 ASSAY OF LIPASE: CPT | Performed by: ADVANCED PRACTICE MIDWIFE

## 2022-06-29 PROCEDURE — 82150 ASSAY OF AMYLASE: CPT | Performed by: ADVANCED PRACTICE MIDWIFE

## 2022-06-29 PROCEDURE — 36415 COLL VENOUS BLD VENIPUNCTURE: CPT | Performed by: ADVANCED PRACTICE MIDWIFE

## 2022-06-29 NOTE — NURSING NOTE
"Chief Complaint   Patient presents with     Prenatal Care     22w 4d, +FM. Reports all over stomach pain that rotates since last night. Had BM today that was normal, has had N&V, denies fever. No exposures to anyone recently ill.        Initial /66 (BP Location: Right arm, Cuff Size: Adult Regular)   Pulse 80   Temp 98.2  F (36.8  C) (Oral)   Resp 14   Ht 1.549 m (5' 1\")   Wt 73.3 kg (161 lb 8 oz)   LMP 2021 (Within Weeks)   Breastfeeding No   BMI 30.52 kg/m   Estimated body mass index is 30.52 kg/m  as calculated from the following:    Height as of this encounter: 1.549 m (5' 1\").    Weight as of this encounter: 73.3 kg (161 lb 8 oz).  BP completed using cuff size: regular    Questioned patient about current smoking habits.  Pt. has never smoked.          Patient stated that she is unable to leave a urine sample. Offered juice or water, patient declined stating the she feels too nauseous to drink anything and will vomit. Emesis bag offered and accepted by patient. Provider notified         "

## 2022-06-29 NOTE — TELEPHONE ENCOUNTER
Patient calling.   22w4d  Baby is moving    Was seen for abdominal; pain. Lower and mid abdomen.  Has gotten worse since last night. Pain is a 6-8 on a 1-10 scale. Pain is more intense than last week.  Very uncomfortable. Was not able to sleep last night.  No bleeding or vaginal discharge.  Denies constipation.  Has had increased nausea and vomiting since the weekend.  Only taking a few sips of water.  Other options reviewed.  Had urinated some.  Concerned more about the pain more that anything    Please advise.  Lolita Higgins RN

## 2022-06-29 NOTE — PROGRESS NOTES
"S: Patient reports significant substernal and RUQ abdominal pain. Pain started last night and made it difficult for her sleep. Also has persistent nausea and vomiting.  Baby active.  Denies uterine cramping, vaginal bleeding or leaking of fluid  O: Vitals: /66 (BP Location: Right arm, Cuff Size: Adult Regular)   Pulse 80   Temp 98.2  F (36.8  C) (Oral)   Resp 14   Ht 1.549 m (5' 1\")   Wt 73.3 kg (161 lb 8 oz)   LMP 11/29/2021 (Within Weeks)   Breastfeeding No   BMI 30.52 kg/m    BMI= Body mass index is 30.52 kg/m .  Exam:  Constitutional: healthy, alert and no distress  Respiratory: respirations even and unlabored  Gastrointestinal: Abdomen soft, non-tender. Fundus measures appropriate for gestational age. Fetal heart tones hear without difficulty and within normal limits  : Deferred  Psychiatric: mentation appears normal and affect normal/bright  A:    Diagnosis Comments   1. Abdominal pain in pregnancy, second trimester       2. Abdominal pain, right upper quadrant  CBC with platelets and differential, Comprehensive metabolic panel (BMP + Alb, Alk Phos, ALT, AST, Total. Bili, TP), Amylase, Lipase, US Abdomen Complete      P: Labs and imaging pending. Will see the patient back in 2 days for follow up. Advised patient to call if symptoms became worse in the interim      WAN Boateng, COLE    "

## 2022-06-30 ENCOUNTER — ANCILLARY PROCEDURE (OUTPATIENT)
Dept: ULTRASOUND IMAGING | Facility: CLINIC | Age: 32
End: 2022-06-30
Attending: ADVANCED PRACTICE MIDWIFE
Payer: COMMERCIAL

## 2022-06-30 DIAGNOSIS — R10.11 ABDOMINAL PAIN, RIGHT UPPER QUADRANT: ICD-10-CM

## 2022-06-30 LAB — LIPASE SERPL-CCNC: 15 U/L (ref 13–60)

## 2022-06-30 PROCEDURE — 76700 US EXAM ABDOM COMPLETE: CPT

## 2022-07-01 ENCOUNTER — PRENATAL OFFICE VISIT (OUTPATIENT)
Dept: MIDWIFE SERVICES | Facility: CLINIC | Age: 32
End: 2022-07-01
Payer: COMMERCIAL

## 2022-07-01 VITALS — WEIGHT: 161 LBS | BODY MASS INDEX: 30.42 KG/M2 | DIASTOLIC BLOOD PRESSURE: 68 MMHG | SYSTOLIC BLOOD PRESSURE: 100 MMHG

## 2022-07-01 DIAGNOSIS — R10.9 ABDOMINAL PAIN IN PREGNANCY, SECOND TRIMESTER: Primary | ICD-10-CM

## 2022-07-01 DIAGNOSIS — O26.892 ABDOMINAL PAIN IN PREGNANCY, SECOND TRIMESTER: Primary | ICD-10-CM

## 2022-07-01 LAB
ALBUMIN SERPL-MCNC: 3 G/DL (ref 3.4–5)
ALP SERPL-CCNC: 47 U/L (ref 40–150)
ALT SERPL W P-5'-P-CCNC: 8 U/L (ref 0–50)
AMYLASE SERPL-CCNC: 23 U/L (ref 30–110)
ANION GAP SERPL CALCULATED.3IONS-SCNC: 8 MMOL/L (ref 3–14)
AST SERPL W P-5'-P-CCNC: 9 U/L (ref 0–45)
BILIRUB SERPL-MCNC: 0.3 MG/DL (ref 0.2–1.3)
BUN SERPL-MCNC: 4 MG/DL (ref 7–30)
CALCIUM SERPL-MCNC: 8.9 MG/DL (ref 8.5–10.1)
CHLORIDE BLD-SCNC: 110 MMOL/L (ref 94–109)
CO2 SERPL-SCNC: 23 MMOL/L (ref 20–32)
CREAT SERPL-MCNC: 0.32 MG/DL (ref 0.52–1.04)
GFR SERPL CREATININE-BSD FRML MDRD: >90 ML/MIN/1.73M2
GLUCOSE BLD-MCNC: 90 MG/DL (ref 70–99)
POTASSIUM BLD-SCNC: 3.6 MMOL/L (ref 3.4–5.3)
PROT SERPL-MCNC: 6.3 G/DL (ref 6.8–8.8)
SODIUM SERPL-SCNC: 141 MMOL/L (ref 133–144)

## 2022-07-01 PROCEDURE — 36415 COLL VENOUS BLD VENIPUNCTURE: CPT | Performed by: ADVANCED PRACTICE MIDWIFE

## 2022-07-01 PROCEDURE — 80053 COMPREHEN METABOLIC PANEL: CPT | Performed by: ADVANCED PRACTICE MIDWIFE

## 2022-07-01 PROCEDURE — 99207 PR PRENATAL VISIT: CPT | Performed by: ADVANCED PRACTICE MIDWIFE

## 2022-07-01 NOTE — PROGRESS NOTES
S: Feels better compared to two days ago.  Baby active.  Denies uterine cramping, vaginal bleeding or leaking of fluid  O: Vitals: /68 (BP Location: Right arm, Cuff Size: Adult Regular)   Wt 73 kg (161 lb)   LMP 11/29/2021 (Within Weeks)   BMI 30.42 kg/m    BMI= Body mass index is 30.42 kg/m .  Exam:  Constitutional: healthy, alert and no distress  Respiratory: respirations even and unlabored  Gastrointestinal: Abdomen soft, non-tender. Fundus measures appropriate for gestational age. Fetal heart tones hear without difficulty and within normal limits  : Deferred  Psychiatric: mentation appears normal and affect normal/bright    EXAM: US ABDOMEN COMPLETE  LOCATION: [facility]  DATE/TIME: 6/30/2022 8:55 AM     INDICATION:  Abdominal pain, right upper quadrant.  COMPARISON: None available.  TECHNIQUE: Complete abdominal ultrasound.     FINDINGS:     GALLBLADDER: Normal. No gallstones, wall thickening, or pericholecystic fluid. Negative sonographic Anderson's sign.     BILE DUCTS: No biliary dilatation. The common duct measures 3 mm.     LIVER: Demonstrates increased parenchymal echogenicity. No focal mass.     RIGHT KIDNEY: Measures 9.1 cm in craniocaudal dimension. It demonstrates normal cortical echogenicity and thickness of 1.3 cm. Mild hydronephrosis. No shadowing calculi visualized.      LEFT KIDNEY: Measures 10.8 cm in craniocaudal dimension. It demonstrates normal cortical echogenicity and thickness of 2.0 cm. No hydronephrosis or shadowing calculi.      SPLEEN: Normal.     PANCREAS: The visualized portions are normal.     AORTA: Normal in caliber.      IVC: Normal where visualized.     No ascites.                                                                      IMPRESSION:  1.  Mild right hydronephrosis. No shadowing calculi visualized. Findings can be further evaluated with abdominal CT to exclude ureteral stone.  2.  Hepatic steatosis.       Results for orders placed or  performed in visit on 07/01/22   Comprehensive metabolic panel (BMP + Alb, Alk Phos, ALT, AST, Total. Bili, TP)     Status: Abnormal   Result Value Ref Range    Sodium 141 133 - 144 mmol/L    Potassium 3.6 3.4 - 5.3 mmol/L    Chloride 110 (H) 94 - 109 mmol/L    Carbon Dioxide (CO2) 23 20 - 32 mmol/L    Anion Gap 8 3 - 14 mmol/L    Urea Nitrogen 4 (L) 7 - 30 mg/dL    Creatinine 0.32 (L) 0.52 - 1.04 mg/dL    Calcium 8.9 8.5 - 10.1 mg/dL    Glucose 90 70 - 99 mg/dL    Alkaline Phosphatase 47 40 - 150 U/L    AST 9 0 - 45 U/L    ALT 8 0 - 50 U/L    Protein Total 6.3 (L) 6.8 - 8.8 g/dL    Albumin 3.0 (L) 3.4 - 5.0 g/dL    Bilirubin Total 0.3 0.2 - 1.3 mg/dL    GFR Estimate >90 >60 mL/min/1.73m2     A:    Diagnosis Comments   1. Abdominal pain in pregnancy, second trimester  omeprazole (PRILOSEC) 20 MG DR capsule        P: Discussed ultrasound and lab results with MD in clinic. Normal labs. Advised patient resume Zofran. Will do a trial of omeprazole. Will see patient back for follow up next week. Advised patient to call if symptoms persist or become worse in the interim. Encouraged patient to call with any questions or concerns.      WAN Boateng, CNM

## 2022-07-06 ENCOUNTER — PRENATAL OFFICE VISIT (OUTPATIENT)
Dept: MIDWIFE SERVICES | Facility: CLINIC | Age: 32
End: 2022-07-06
Payer: COMMERCIAL

## 2022-07-06 VITALS — DIASTOLIC BLOOD PRESSURE: 62 MMHG | SYSTOLIC BLOOD PRESSURE: 106 MMHG | WEIGHT: 164.4 LBS | BODY MASS INDEX: 31.06 KG/M2

## 2022-07-06 DIAGNOSIS — R10.9 ABDOMINAL PAIN IN PREGNANCY, SECOND TRIMESTER: Primary | ICD-10-CM

## 2022-07-06 DIAGNOSIS — O26.892 ABDOMINAL PAIN IN PREGNANCY, SECOND TRIMESTER: Primary | ICD-10-CM

## 2022-07-06 PROCEDURE — 99207 PR PRENATAL VISIT: CPT | Performed by: ADVANCED PRACTICE MIDWIFE

## 2022-07-06 NOTE — PATIENT INSTRUCTIONS
Weeks 24 thru 26 - Gestational Age (Fetal Age - Weeks 22 thru 24)- Beginning the third trimester:  If your baby was delivered now, it could possibly survive outside of your body, with the assistance of medical technology. The fetus has developed patterns of  sleeping and waking and mom will begin to notice when each of these takes place. The fetus has a startle reflex, and the air sacs in the lungs have begun formation. The brain will be developing rapidly over the next few weeks. The nervous system has developed enough to control some functions. The fetus has reached about 14 inches in length and weighs about 2   pounds.     GESTATIONAL DIABETES SCREENING    All pregnant women are screened at least once for diabetes as part of their prenatal care. A woman has gestational diabetes if she has high blood sugars for the first time during pregnancy.    Diabetes can harm your health and the health of your baby.  But if we find the diabetes early in pregnancy we will watch your health closely and prevent further problems.     We will check for gestational diabetes during 28 week visit. Please note you can not do this prior to 24 weeks of gestation.    Plan to spend about an hour at the clinic.  When you check in let us know that you will be having your diabetes screening that day.     We will give you a 50 gram glucose drink that you have 5 minutes to consume.  Exactly one hour later you will have draw blood from your arm to check your blood sugar level.    We will call you to let you know if your results are normal.  If the results are normal no more testing will be needed.  If your results are not normal we will discuss follow up testing with you.      You may eat prior to the testing but it is not recommended to eat or drink very sweet things such as pop, juice, candy or dessert type foods.  Eat a high protein, low carb meals prior to testing.    If you have any questions please call:    [facility]   [phone]

## 2022-07-06 NOTE — PROGRESS NOTES
S: Feels much better compared to last week. Has been able to eat more.  Baby active.  Denies uterine cramping, vaginal bleeding or leaking of fluid  O: Vitals: /62 (BP Location: Left arm, Cuff Size: Adult Regular)   Wt 74.6 kg (164 lb 6.4 oz)   LMP 11/29/2021 (Within Weeks)   Breastfeeding No   BMI 31.06 kg/m    BMI= Body mass index is 31.06 kg/m .  Exam:  Constitutional: healthy, alert and no distress  Respiratory: respirations even and unlabored  Gastrointestinal: Abdomen soft, non-tender. Fundus measures appropriate for gestational age. Fetal heart tones hear without difficulty and within normal limits  : Deferred  Psychiatric: mentation appears normal and affect normal/bright  A:    Diagnosis Comments   1. Abdominal pain in pregnancy, second trimester         P: Pain much more improved.  Discussed GCT/repeat RPR for next visit, handout provided, reminded of longer appointment.  Tdap next visit; reviewed CDC recommendations and partner/family vaccination recommended as well.  Need for Rhogam? No, to be done next visit   Encouraged patient to call with any questions or concerns.  Return to clinic 4 weeks    WAN Boateng, COLE

## 2022-07-29 ENCOUNTER — PRENATAL OFFICE VISIT (OUTPATIENT)
Dept: MIDWIFE SERVICES | Facility: CLINIC | Age: 32
End: 2022-07-29
Payer: COMMERCIAL

## 2022-07-29 VITALS — WEIGHT: 161.7 LBS | DIASTOLIC BLOOD PRESSURE: 60 MMHG | SYSTOLIC BLOOD PRESSURE: 104 MMHG | BODY MASS INDEX: 30.55 KG/M2

## 2022-07-29 DIAGNOSIS — R79.89 LOW VITAMIN D LEVEL: ICD-10-CM

## 2022-07-29 DIAGNOSIS — O99.012 ANEMIA AFFECTING PREGNANCY IN SECOND TRIMESTER: Primary | ICD-10-CM

## 2022-07-29 DIAGNOSIS — Z34.82 ENCOUNTER FOR SUPERVISION OF OTHER NORMAL PREGNANCY, SECOND TRIMESTER: Primary | ICD-10-CM

## 2022-07-29 LAB
ERYTHROCYTE [DISTWIDTH] IN BLOOD BY AUTOMATED COUNT: 13.7 % (ref 10–15)
GLUCOSE 1H P 50 G GLC PO SERPL-MCNC: 119 MG/DL (ref 70–129)
HCT VFR BLD AUTO: 31.1 % (ref 35–47)
HGB BLD-MCNC: 10.6 G/DL (ref 11.7–15.7)
MCH RBC QN AUTO: 29.1 PG (ref 26.5–33)
MCHC RBC AUTO-ENTMCNC: 34.1 G/DL (ref 31.5–36.5)
MCV RBC AUTO: 85 FL (ref 78–100)
PLATELET # BLD AUTO: 151 10E3/UL (ref 150–450)
RBC # BLD AUTO: 3.64 10E6/UL (ref 3.8–5.2)
T PALLIDUM AB SER QL: NONREACTIVE
WBC # BLD AUTO: 4.7 10E3/UL (ref 4–11)

## 2022-07-29 PROCEDURE — 36415 COLL VENOUS BLD VENIPUNCTURE: CPT | Performed by: ADVANCED PRACTICE MIDWIFE

## 2022-07-29 PROCEDURE — 82950 GLUCOSE TEST: CPT | Performed by: ADVANCED PRACTICE MIDWIFE

## 2022-07-29 PROCEDURE — 86780 TREPONEMA PALLIDUM: CPT | Performed by: ADVANCED PRACTICE MIDWIFE

## 2022-07-29 PROCEDURE — 82306 VITAMIN D 25 HYDROXY: CPT | Performed by: ADVANCED PRACTICE MIDWIFE

## 2022-07-29 PROCEDURE — 99207 PR PRENATAL VISIT: CPT | Performed by: ADVANCED PRACTICE MIDWIFE

## 2022-07-29 PROCEDURE — 85027 COMPLETE CBC AUTOMATED: CPT | Performed by: ADVANCED PRACTICE MIDWIFE

## 2022-07-29 RX ORDER — LANOLIN ALCOHOL/MO/W.PET/CERES
1000 CREAM (GRAM) TOPICAL DAILY
Qty: 90 TABLET | Refills: 1 | Status: SHIPPED | OUTPATIENT
Start: 2022-07-29 | End: 2022-10-21

## 2022-07-29 RX ORDER — MULTIVIT WITH MINERALS/LUTEIN
250 TABLET ORAL DAILY
Qty: 90 TABLET | Refills: 1 | Status: SHIPPED | OUTPATIENT
Start: 2022-07-29

## 2022-07-29 RX ORDER — FERROUS SULFATE 325(65) MG
325 TABLET ORAL
Qty: 90 TABLET | Refills: 1 | Status: SHIPPED | OUTPATIENT
Start: 2022-07-29

## 2022-07-29 RX ORDER — ONDANSETRON 4 MG/1
4 TABLET, ORALLY DISINTEGRATING ORAL EVERY 8 HOURS PRN
Qty: 30 TABLET | Refills: 4 | Status: SHIPPED | OUTPATIENT
Start: 2022-07-29 | End: 2022-08-26

## 2022-07-29 NOTE — NURSING NOTE
"Chief Complaint   Patient presents with     Prenatal Care     26 weeks 6 days   GCT RPR and CBC today        Initial /60 (BP Location: Left arm, Cuff Size: Adult Regular)   Wt 73.3 kg (161 lb 11.2 oz)   LMP 2021 (Within Weeks)   Breastfeeding No   BMI 30.55 kg/m   Estimated body mass index is 30.55 kg/m  as calculated from the following:    Height as of 22: 1.549 m (5' 1\").    Weight as of this encounter: 73.3 kg (161 lb 11.2 oz).  BP completed using cuff size: regular    Questioned patient about current smoking habits.  Pt. has never smoked.    26w6d      The following HM Due: NONE      +FM daily   +GCT RPR CBC today   +Nausea and vomiting ongoing       Neetu Garza, CMA on 2022 at 10:50 AM           "

## 2022-07-29 NOTE — PROGRESS NOTES
S: Feels better overall,  Baby active.  Has nausea and vomiting with teeth brushing but otherwise eating regularly but needs to eat very slowly. Denies uterine cramping, vaginal bleeding or leaking of fluid.  O: Vitals: LMP 11/29/2021 (Within Weeks)   BMI= There is no height or weight on file to calculate BMI.  Exam:  Constitutional: healthy, alert and no distress  Respiratory: respirations even and unlabored  Gastrointestinal: Abdomen soft, non-tender. Fundus measures appropriate for gestational age. Fetal heart tones hear without difficulty and within normal limits  : Deferred  Psychiatric: mentation appears normal and affect normal/bright  A:     ICD-10-CM    1. Encounter for supervision of other normal pregnancy, second trimester  Z34.82      P: GCT/repeat RPR and Vitamin D lab, handout provided.  Tdap given; reviewed CDC recommendations and partner/family vaccination recommended as well.  Need for Rhogam? No.   Discussed patient options for postpartum contraception. Patient is planning condoms  Encouraged patient to call with any questions or concerns.  Return to clinic 2 weeks    WAN Eisenberg CNM

## 2022-07-30 LAB — DEPRECATED CALCIDIOL+CALCIFEROL SERPL-MC: 29 UG/L (ref 20–75)

## 2022-08-12 ENCOUNTER — PRENATAL OFFICE VISIT (OUTPATIENT)
Dept: MIDWIFE SERVICES | Facility: CLINIC | Age: 32
End: 2022-08-12
Payer: COMMERCIAL

## 2022-08-12 VITALS — SYSTOLIC BLOOD PRESSURE: 98 MMHG | DIASTOLIC BLOOD PRESSURE: 52 MMHG | BODY MASS INDEX: 30.8 KG/M2 | WEIGHT: 163 LBS

## 2022-08-12 DIAGNOSIS — O21.9 NAUSEA AND VOMITING IN PREGNANCY: ICD-10-CM

## 2022-08-12 DIAGNOSIS — Z34.83 ENCOUNTER FOR SUPERVISION OF OTHER NORMAL PREGNANCY, THIRD TRIMESTER: Primary | ICD-10-CM

## 2022-08-12 PROCEDURE — 99207 PR PRENATAL VISIT: CPT | Performed by: ADVANCED PRACTICE MIDWIFE

## 2022-08-12 RX ORDER — ONDANSETRON 4 MG/1
4 TABLET, ORALLY DISINTEGRATING ORAL EVERY 8 HOURS PRN
Qty: 40 TABLET | Refills: 3 | Status: SHIPPED | OUTPATIENT
Start: 2022-08-12 | End: 2022-09-16

## 2022-08-12 NOTE — PATIENT INSTRUCTIONS
Weeks 27 thru 32 - Gestational Age (Fetal Age - Weeks 25 thru 30):  The fetus really fills out over these next few weeks, storing fat on the body, reaching about 15-17 inches long and weighing about 4-4   lbs by the 32nd week. The lungs are not fully mature yet, but some rhythmic breathing movements are occurring. The bones are fully developed, but are still soft and pliable. The fetus is storing its own calcium, iron and phosphorus. The eyelids open after being closed, since the end of the first trimester.

## 2022-08-12 NOTE — PROGRESS NOTES
S: Feels better. Has started gaining some weight.  Baby active.  Denies uterine cramping, vaginal bleeding or leaking of fluid  O: Vitals: BP 98/52 (BP Location: Left arm, Cuff Size: Adult Regular)   Wt 73.9 kg (163 lb)   LMP 11/29/2021 (Within Weeks)   Breastfeeding No   BMI 30.80 kg/m    BMI= Body mass index is 30.8 kg/m .  Exam:  Constitutional: healthy, alert and no distress  Respiratory: respirations even and unlabored  Gastrointestinal: Abdomen soft, non-tender. Fundus measures appropriate for gestational age. Fetal heart tones hear without difficulty and within normal limits  : Deferred  Psychiatric: mentation appears normal and affect normal/bright  A:     ICD-10-CM    1. Encounter for supervision of other normal pregnancy, third trimester  Z34.83    2. Nausea and vomiting in pregnancy  O21.9 ondansetron (ZOFRAN ODT) 4 MG ODT tab     P: Discussed plans for labor.   Encouraged patient to call with any questions or concerns.  Return to clinic 2 weeks    WAN Boateng, COLE

## 2022-08-26 ENCOUNTER — PRENATAL OFFICE VISIT (OUTPATIENT)
Dept: MIDWIFE SERVICES | Facility: CLINIC | Age: 32
End: 2022-08-26
Payer: COMMERCIAL

## 2022-08-26 VITALS — BODY MASS INDEX: 30.65 KG/M2 | DIASTOLIC BLOOD PRESSURE: 62 MMHG | WEIGHT: 162.2 LBS | SYSTOLIC BLOOD PRESSURE: 102 MMHG

## 2022-08-26 DIAGNOSIS — Z34.83 ENCOUNTER FOR SUPERVISION OF OTHER NORMAL PREGNANCY, THIRD TRIMESTER: Primary | ICD-10-CM

## 2022-08-26 PROCEDURE — 99207 PR PRENATAL VISIT: CPT | Performed by: ADVANCED PRACTICE MIDWIFE

## 2022-08-26 NOTE — NURSING NOTE
"Chief Complaint   Patient presents with     Prenatal Care     30w 6d, FM daily. Reports still having N&V.        Initial /62 (BP Location: Right arm, Cuff Size: Adult Regular)   Wt 73.6 kg (162 lb 3.2 oz)   LMP 2021 (Within Weeks)   Breastfeeding No   BMI 30.65 kg/m   Estimated body mass index is 30.65 kg/m  as calculated from the following:    Height as of 22: 1.549 m (5' 1\").    Weight as of this encounter: 73.6 kg (162 lb 3.2 oz).  BP completed using cuff size: regular    Questioned patient about current smoking habits.  Pt. has never smoked.        "

## 2022-09-02 ENCOUNTER — HOSPITAL ENCOUNTER (OUTPATIENT)
Dept: ULTRASOUND IMAGING | Facility: CLINIC | Age: 32
Discharge: HOME | End: 2022-09-02
Attending: OBSTETRICS & GYNECOLOGY
Payer: COMMERCIAL

## 2022-09-02 ENCOUNTER — OFFICE VISIT (OUTPATIENT)
Dept: MATERNAL FETAL MEDICINE | Facility: CLINIC | Age: 32
End: 2022-09-02
Attending: OBSTETRICS & GYNECOLOGY
Payer: COMMERCIAL

## 2022-09-02 DIAGNOSIS — O98.513 COVID-19 AFFECTING PREGNANCY IN THIRD TRIMESTER: Primary | ICD-10-CM

## 2022-09-02 DIAGNOSIS — U07.1 COVID-19 AFFECTING PREGNANCY IN THIRD TRIMESTER: ICD-10-CM

## 2022-09-02 DIAGNOSIS — U07.1 COVID-19 AFFECTING PREGNANCY IN THIRD TRIMESTER: Primary | ICD-10-CM

## 2022-09-02 DIAGNOSIS — O98.513 COVID-19 AFFECTING PREGNANCY IN THIRD TRIMESTER: ICD-10-CM

## 2022-09-02 PROCEDURE — 76816 OB US FOLLOW-UP PER FETUS: CPT | Mod: 26 | Performed by: OBSTETRICS & GYNECOLOGY

## 2022-09-02 PROCEDURE — 76816 OB US FOLLOW-UP PER FETUS: CPT

## 2022-09-02 NOTE — PROGRESS NOTES
"Please see \"Imaging\" tab under Chart Review for full details.    Jennifer Mcleod MD  Maternal Fetal Medicine    "

## 2022-09-04 ENCOUNTER — HEALTH MAINTENANCE LETTER (OUTPATIENT)
Age: 32
End: 2022-09-04

## 2022-09-16 ENCOUNTER — PRENATAL OFFICE VISIT (OUTPATIENT)
Dept: MIDWIFE SERVICES | Facility: CLINIC | Age: 32
End: 2022-09-16
Payer: COMMERCIAL

## 2022-09-16 VITALS — SYSTOLIC BLOOD PRESSURE: 100 MMHG | BODY MASS INDEX: 30.16 KG/M2 | WEIGHT: 159.6 LBS | DIASTOLIC BLOOD PRESSURE: 52 MMHG

## 2022-09-16 DIAGNOSIS — Z34.83 ENCOUNTER FOR SUPERVISION OF OTHER NORMAL PREGNANCY, THIRD TRIMESTER: Primary | ICD-10-CM

## 2022-09-16 DIAGNOSIS — O21.9 NAUSEA AND VOMITING IN PREGNANCY: ICD-10-CM

## 2022-09-16 PROCEDURE — 99207 PR PRENATAL VISIT: CPT

## 2022-09-16 RX ORDER — ONDANSETRON 4 MG/1
4 TABLET, ORALLY DISINTEGRATING ORAL EVERY 8 HOURS PRN
Qty: 40 TABLET | Refills: 3 | Status: ON HOLD | OUTPATIENT
Start: 2022-09-16 | End: 2022-11-01

## 2022-09-16 NOTE — PROGRESS NOTES
S: Feels okay,  Baby active.  Denies uterine cramping, vaginal bleeding or leaking of fluid    Continues to have nausea and vomiting, taking Zofran and helps some with the nausea.      O: Vitals: /52 (BP Location: Left arm, Cuff Size: Adult Regular)   Wt 72.4 kg (159 lb 9.6 oz)   LMP 11/29/2021 (Within Weeks)   Breastfeeding No   BMI 30.16 kg/m    BMI= Body mass index is 30.16 kg/m .  Exam:  Constitutional: healthy, alert and no distress  Respiratory: respirations even and unlabored  Gastrointestinal: Abdomen soft, non-tender. Fundus measures appropriate for gestational age. Fetal heart tones hear without difficulty and within normal limits  : Deferred  Psychiatric: mentation appears normal and affect normal/bright  A:     ICD-10-CM    1. Encounter for supervision of other normal pregnancy, third trimester  Z34.83      P: Discussed GBS screen for next visit. Discussed plans for labor. Discussed patient options for postpartum contraception. Patient is planning condoms  Encouraged patient to call with any questions or concerns.  Return to clinic 2 weeks    WAN HERCULES CNM

## 2022-09-16 NOTE — NURSING NOTE
"Chief Complaint   Patient presents with     Prenatal Care     33w 6d, +FM daily. N&V, poor appetite, losing weight, not sleeping well, uncomfortable       Initial /52 (BP Location: Left arm, Cuff Size: Adult Regular)   Wt 72.4 kg (159 lb 9.6 oz)   LMP 2021 (Within Weeks)   Breastfeeding No   BMI 30.16 kg/m   Estimated body mass index is 30.16 kg/m  as calculated from the following:    Height as of 22: 1.549 m (5' 1\").    Weight as of this encounter: 72.4 kg (159 lb 9.6 oz).  BP completed using cuff size: regular    Questioned patient about current smoking habits.  Pt. has never smoked.             "

## 2022-09-28 ENCOUNTER — TELEPHONE (OUTPATIENT)
Dept: MIDWIFE SERVICES | Facility: CLINIC | Age: 32
End: 2022-09-28

## 2022-09-28 NOTE — TELEPHONE ENCOUNTER
Pt calling, has been having low abdominal pain for the past couple days, pain is crampy and comes and goes.  Low back pain as well.  Pain improves slightly with rest.  Shortness of breath when baby moves.  Denies VB or discharge.    Denies urinary or vaginal sx.  Has some white creamy discharge that is normal for her.  Is drinking plenty of water, decreased appetite.    Scheduled for appt Friday as she is not seeing anyone until the following week.    Reviewed to CB with consistent contractions, LOF, VB or decreased fetal movement.  Encouraged hydration, tylenol for pain, warm bath/shower for comfort.   Call back with further concerns.    Graciela Kramer RN

## 2022-09-30 ENCOUNTER — PRENATAL OFFICE VISIT (OUTPATIENT)
Dept: MIDWIFE SERVICES | Facility: CLINIC | Age: 32
End: 2022-09-30
Payer: COMMERCIAL

## 2022-09-30 VITALS — WEIGHT: 160 LBS | DIASTOLIC BLOOD PRESSURE: 72 MMHG | SYSTOLIC BLOOD PRESSURE: 102 MMHG | BODY MASS INDEX: 30.23 KG/M2

## 2022-09-30 DIAGNOSIS — N30.00 ACUTE CYSTITIS WITHOUT HEMATURIA: ICD-10-CM

## 2022-09-30 DIAGNOSIS — Z34.83 ENCOUNTER FOR SUPERVISION OF OTHER NORMAL PREGNANCY, THIRD TRIMESTER: Primary | ICD-10-CM

## 2022-09-30 LAB
ALBUMIN UR-MCNC: 30 MG/DL
APPEARANCE UR: CLEAR
BACTERIA #/AREA URNS HPF: ABNORMAL /HPF
BILIRUB UR QL STRIP: ABNORMAL
CAOX CRY #/AREA URNS HPF: ABNORMAL /HPF
COLOR UR AUTO: YELLOW
ERYTHROCYTE [DISTWIDTH] IN BLOOD BY AUTOMATED COUNT: 13.8 % (ref 10–15)
GLUCOSE UR STRIP-MCNC: NEGATIVE MG/DL
HCT VFR BLD AUTO: 30.7 % (ref 35–47)
HGB BLD-MCNC: 10.4 G/DL (ref 11.7–15.7)
HGB UR QL STRIP: NEGATIVE
KETONES UR STRIP-MCNC: >=160 MG/DL
LEUKOCYTE ESTERASE UR QL STRIP: NEGATIVE
MCH RBC QN AUTO: 28.5 PG (ref 26.5–33)
MCHC RBC AUTO-ENTMCNC: 33.9 G/DL (ref 31.5–36.5)
MCV RBC AUTO: 84 FL (ref 78–100)
MUCOUS THREADS #/AREA URNS LPF: PRESENT /LPF
NITRATE UR QL: NEGATIVE
PH UR STRIP: 6 [PH] (ref 5–7)
PLATELET # BLD AUTO: 121 10E3/UL (ref 150–450)
RBC # BLD AUTO: 3.65 10E6/UL (ref 3.8–5.2)
RBC #/AREA URNS AUTO: ABNORMAL /HPF
SP GR UR STRIP: >=1.03 (ref 1–1.03)
SQUAMOUS #/AREA URNS AUTO: ABNORMAL /LPF
UROBILINOGEN UR STRIP-ACNC: 1 E.U./DL
WBC # BLD AUTO: 4.5 10E3/UL (ref 4–11)
WBC #/AREA URNS AUTO: ABNORMAL /HPF

## 2022-09-30 PROCEDURE — 36415 COLL VENOUS BLD VENIPUNCTURE: CPT

## 2022-09-30 PROCEDURE — 87086 URINE CULTURE/COLONY COUNT: CPT

## 2022-09-30 PROCEDURE — 81001 URINALYSIS AUTO W/SCOPE: CPT

## 2022-09-30 PROCEDURE — 99207 PR PRENATAL VISIT: CPT

## 2022-09-30 PROCEDURE — 85027 COMPLETE CBC AUTOMATED: CPT

## 2022-09-30 PROCEDURE — 87653 STREP B DNA AMP PROBE: CPT

## 2022-09-30 NOTE — PROGRESS NOTES
S: Feels discomfort, cramping around lower abdomen. Lower back pain.  Baby active.  Denies uterine cramping, vaginal bleeding or leaking of fluid. No headache, increase in edema, no epigastric pain.   O: Vitals: /72 (BP Location: Right arm, Cuff Size: Adult Regular)   Wt 72.6 kg (160 lb)   LMP 11/29/2021 (Within Weeks)   BMI 30.23 kg/m    BMI= Body mass index is 30.23 kg/m .  Exam:  Constitutional: healthy, alert and no distress  Respiratory: respirations even and unlabored  Gastrointestinal: Abdomen soft, non-tender. Fundus measures appropriate for gestational age. Fetal heart tones hear without difficulty and within normal limits  : Normal external genitalia without lesions  Psychiatric: mentation appears normal and affect normal/bright  A:     ICD-10-CM    1. Encounter for supervision of other normal pregnancy, third trimester  Z34.83 Group B strep PCR     CBC with platelets     CBC with platelets   2. Acute cystitis without hematuria  N30.00 UA macro with reflex to Microscopic and Culture - Clinc Collect     Urine Culture Aerobic Bacterial - lab collect     P: Labor signs and symptoms discussed, aware of numbers to call  Reviewed labor symptoms  Collect UA to rule out UTI  Discussed warning signs of PIH/preeclampsia and patient will monitor.  GBS screen completed. Discussed plans for labor. Discussed patient options for postpartum contraception. Patient is planning condoms  Encouraged patient to call with any questions or concerns.  Return to clinic 1 weeks    WAN HERCULES CNM

## 2022-09-30 NOTE — NURSING NOTE
"Chief Complaint   Patient presents with     Prenatal Care     35w 6d c/o low abd cramping/pain/pressure, low back pain, very uncomfortable        Initial /72 (BP Location: Right arm, Cuff Size: Adult Regular)   Wt 72.6 kg (160 lb)   LMP 2021 (Within Weeks)   BMI 30.23 kg/m   Estimated body mass index is 30.23 kg/m  as calculated from the following:    Height as of 22: 1.549 m (5' 1\").    Weight as of this encounter: 72.6 kg (160 lb).  BP completed using cuff size: regular    Questioned patient about current smoking habits.  Pt. has never smoked.          The following HM Due: NONE    +FM  -UTI sx  "

## 2022-10-01 LAB — GP B STREP DNA SPEC QL NAA+PROBE: NEGATIVE

## 2022-10-02 LAB — BACTERIA UR CULT: NORMAL

## 2022-10-04 ENCOUNTER — APPOINTMENT (OUTPATIENT)
Dept: ULTRASOUND IMAGING | Facility: CLINIC | Age: 32
End: 2022-10-04
Attending: ADVANCED PRACTICE MIDWIFE
Payer: COMMERCIAL

## 2022-10-04 ENCOUNTER — HOSPITAL ENCOUNTER (OUTPATIENT)
Facility: CLINIC | Age: 32
End: 2022-10-04
Admitting: ADVANCED PRACTICE MIDWIFE
Payer: COMMERCIAL

## 2022-10-04 ENCOUNTER — NURSE TRIAGE (OUTPATIENT)
Dept: NURSING | Facility: CLINIC | Age: 32
End: 2022-10-04

## 2022-10-04 ENCOUNTER — HOSPITAL ENCOUNTER (OUTPATIENT)
Facility: CLINIC | Age: 32
Discharge: HOME | End: 2022-10-04
Attending: ADVANCED PRACTICE MIDWIFE | Admitting: ADVANCED PRACTICE MIDWIFE
Payer: COMMERCIAL

## 2022-10-04 ENCOUNTER — TRANSFERRED RECORDS (OUTPATIENT)
Dept: ADMINISTRATIVE | Facility: CLINIC | Age: 32
End: 2022-10-04

## 2022-10-04 VITALS
HEIGHT: 61 IN | SYSTOLIC BLOOD PRESSURE: 114 MMHG | TEMPERATURE: 97.9 F | WEIGHT: 159 LBS | BODY MASS INDEX: 30.02 KG/M2 | DIASTOLIC BLOOD PRESSURE: 76 MMHG | RESPIRATION RATE: 18 BRPM

## 2022-10-04 PROBLEM — O36.8130 DECREASED FETAL MOVEMENTS IN THIRD TRIMESTER: Status: ACTIVE | Noted: 2022-10-04

## 2022-10-04 PROCEDURE — 59025 FETAL NON-STRESS TEST: CPT | Mod: 59

## 2022-10-04 PROCEDURE — G0463 HOSPITAL OUTPT CLINIC VISIT: HCPCS | Mod: 25

## 2022-10-04 PROCEDURE — 59025 FETAL NON-STRESS TEST: CPT | Performed by: ADVANCED PRACTICE MIDWIFE

## 2022-10-04 PROCEDURE — 76819 FETAL BIOPHYS PROFIL W/O NST: CPT

## 2022-10-04 RX ORDER — LIDOCAINE 40 MG/G
CREAM TOPICAL
Status: DISCONTINUED | OUTPATIENT
Start: 2022-10-04 | End: 2022-10-04 | Stop reason: HOSPADM

## 2022-10-04 ASSESSMENT — ACTIVITIES OF DAILY LIVING (ADL): ADLS_ACUITY_SCORE: 31

## 2022-10-04 NOTE — PROGRESS NOTES
"Outpatient/Triage Note:    Patient Name:  Agustin Ortega  :      1990  MRN:      [id number]      Assessment:   @ 36w3d here for evaluation of decreased fetal movement   Not in labor  FHR Category 1  BPP       Plan:   - Discharge to home undelivered.   - Review warning signs including decreased fetal movement, leaking of fluid, vaginal bleeding, or signs of labor.  - Review how to contact on-call CNM. Follow-up in clinic with CNM as scheduled or sooner as needed.       Subjective:  Agustin Ortega is a 31 year old  at 36+3 weeks, with an EDC of 10/29/22 who presented to [facility] for evaluation of decreased fetal movement. Reports feeling movement last at 0300 but feels fetal movement has changed. Denies leaking of fluid, bleeding, or regular uterine contractions.      After returning from ultrasound patient reports feeling more movement.     Objective:  Vital signs: Temp 97.9  F (36.6  C) (Oral)   Resp 18   Ht 1.549 m (5' 1\")   Wt 72.1 kg (159 lb)   LMP 2021 (Within Weeks)   BMI 30.04 kg/m    FHR: Baseline 140, moderate variability, accelerations present, no decelerations  Uterine contractions: occasional, patient denies discomfort, reports as her normal   SVE: deferred    Temp:  [97.9  F (36.6  C)] 97.9  F (36.6  C)  Resp:  [18] 18  No intake or output data in the 24 hours ending 10/04/22 0953    Results:  Results for orders placed or performed during the hospital encounter of 10/04/22   US Fetal Biophys Prof w/o Non Stress Test     Status: None (Preliminary result)    Narrative    ULTRASOUND OBSTETRIC FETAL BIOPHYSICAL PROFILE W/O NST SINGLE W/LTD  2022 11:01 AM     HISTORY: Decreased fetal movement.    COMPARISON: None.    FINDINGS: Cephalic presentation. Fetal heart rate is 144 BPM.  Posterior placenta.    Biophysical profile:  Fetal breathing movements: 2 points.  Gross body movements: 2 points.  Fetal tone: 2 points.  Amniotic fluid:  MVP is 3.1cm, 2 " points.    Total score: 8 points.      Impression    IMPRESSION: Biophysical profile score is 8 out of 8 points. Cephalic  presentation.       Provider:WAN Terry, COLE, CECI

## 2022-10-04 NOTE — PLAN OF CARE
Data: [facility]: 10/4/2022  9:19 AM.  Reason for maternal/fetal assessment is decreased fetal movement. Patient reports she hasn't felt baby move since around 0300 this morning.  She said he has been moving, it just feels very different than it used to.  Patient is a .  Prenatal record reviewed. Pregnancy  has been complicated by nausea and vomiting which has gotten better recently, but is still a struggle.  Gestational Age 36w3d. VSS. Fetal movement decreased since . Patient denies leaking of vaginal fluid/rupture of membranes, vaginal bleeding, abdominal pain, pelvic pressure, headache, visual disturbances, epigastric or URQ pain, significant edema. Support person is not present.   Action: Verbal consent for EFM. Triage assessment completed. Bill of rights reviewed.  Response: Patient verbalized agreement with plan. Will contact Dr Nisa Bucio with update and for further orders.

## 2022-10-04 NOTE — PROVIDER NOTIFICATION
"   10/04/22 0945   Provider Notification   Provider Name/Title COLE Paula   Method of Notification Phone   Request Evaluate - Remote   Notification Reason Patient Arrived     MD updated on patient arrival.  She is a  at 36.3.  She came in complaining of decreased fetal movement since about .  She said she knows \"he is still okay in there but his movements don't feel the same as they used to.\"  She said she was last able to feel him move around 0300 this morning after she ate a little something. She has struggled with nausea and vomiting this pregnancy and some days it is harder than others to tolerate food.  She denies any bleeding or leaking of fluid. This pregnancy has zoya uncomplicated besides the nausea and vomiting and she has an uncomplicated medical history as well.  She said she has been having some contractions on and off, but they always go away after a hot shower.  FHT: 140, minimal variability with a period of moderate, no accels, and no decels.  Ginger occasionally but not feeling.  Patient felt she could tolerate orange juice and some crackers and was going to try that.  CNOBINNA would like a BPP.  Will update CNM as necessary.   "

## 2022-10-04 NOTE — TELEPHONE ENCOUNTER
"OB Triage Call      Is patient's OB/Midwife with the [facility] or [facility]? [facility]- Proceed with triage     Reason for call: Patient reporting decreased fetal movement since Sunny 10/2/22.    Assessment:   Symptoms starting with irregular contractions \"labor pain\" every 5 minutes.   \"Since then the baby has not been moving as much.\"  Difficulty sleeping last night every few hours cramping \"would come a little and disappear.\"   Last contractions around 3 a.m. this morning, lasting 1 hour irregular, resolved with warm shower.   Last fetal movement prior to midnight.  Denies vaginal discharge or bleeding.   Plan: Labor and Delivery    Patient plans to deliver at [facility]     Patient's primary OB Provider is [facility].      Per protocol recommendations Consult needed for  MD or Midwife.  Patient's primary OB is [facility]. Paged on-call midwife for patient's primary OB clinic (refer to where patient is seen as midwives may go to multiple locations) So Ivy  to call FNA back at .  Call returned at 811 a.m. and advised on triage assessment. Does midwife recommend L&D evaluation? Yes-  Labor and delivery at [facility] ([phone]) notified of patient's pending arrival. Patient notified to go to L&D by Midwife     816 a.m. patient notified and will go to [facility] Delivery now.    Is patient's delivering hospital on divert? No      36w3d    Estimated Date of Delivery: Oct 29, 2022        OB History    Para Term  AB Living   2 1 1 0 0 1   SAB IAB Ectopic Multiple Live Births   0 0 0 0 1      # Outcome Date GA Lbr Tip/2nd Weight Sex Delivery Anes PTL Lv   2 Current            1 Term 19 40w4d 04:00 / 02:27 2.835 kg (6 lb 4 oz) M Vag-Spont EPI, Local N NEHEMIAS      Name: FERMIN LAYNE-ASHLEY      Apgar1: 8  Apgar5: 9       Lab Results   Component Value Date    GBS Negative 2019          Laurie Treadwell, RN 10/04/22 7:36 AM  [facility] " [facility] Nurse Advisor    Reason for Disposition    [1] Pregnant 23 or more weeks AND [2] baby moving less today by kick count  (e.g., kick count < 5 in 1 hour or < 10 in 2 hours)    Additional Information    Negative: Sounds like a life-threatening emergency to the triager    Negative: Injury to abdomen    Negative: [1] Pregnant > 36 weeks AND [2] having contractions or other symptoms of labor    Negative: [1] Pregnant < 37 weeks AND [2] having contractions or other symptoms of labor    Negative: [1] Pregnant > 20 weeks AND [2] abdominal pain    Negative: [1] Pregnant > 20 weeks AND [2] vaginal bleeding or spotting    Negative: New blurred vision or vision changes    Negative: [1] SEVERE headache AND [2] not relieved with acetaminophen (e.g., Tylenol)    Negative: Leakage of fluid from vagina    Protocols used: PREGNANCY - DECREASED FETAL MOVEMENT-A-AH

## 2022-10-04 NOTE — DISCHARGE INSTRUCTIONS
Discharge Instruction for Undelivered Patients      You were seen for: Fetal Assessment  We Consulted: COLE Paula  You had (Test or Medicine): Biophysical profile, fetal and uterine monitoring, vital sign check     Diet:   Drink 8 to 12 glasses of liquids (milk, juice, water) every day.  You may eat meals and snacks.     Activity:  Count fetal kicks everyday (see handout)  Call your doctor or nurse midwife if your baby is moving less than usual.     Call your provider if you notice:  Swelling in your face or increased swelling in your hands or legs.  Headaches that are not relieved by Tylenol (acetaminophen).  Changes in your vision (blurring: seeing spots or stars.)  Nausea (sick to your stomach) and vomiting (throwing up).   Weight gain of 5 pounds or more per week.  Heartburn that doesn't go away.  Signs of bladder infection: pain when you urinate (use the toilet), need to go more often and more urgently.  The bag of busch (rupture of membranes) breaks, or you notice leaking in your underwear.  Bright red blood in your underwear.  Abdominal (lower belly) or stomach pain.  For first baby: Contractions (tightening) less than 5 minutes apart for one hour or more.  Second (plus) baby: Contractions (tightening) less than 10 minutes apart and getting stronger.  *If less than 34 weeks: Contractions (tightening) more than 6 times in one hour.  Increase or change in vaginal discharge (note the color and amount)  Other: Call your doctor or midwife with any questions or concerns.     Follow-up:  As scheduled in the clinic

## 2022-10-04 NOTE — PROVIDER NOTIFICATION
"   10/04/22 1115   Provider Notification   Provider Name/Title COLE Paula   Method of Notification Phone   Request Evaluate - Remote   Notification Reason Lab/Diagnostic Study     CNOBINNA updated that patient is back from her BPP.  Preliminary results are 8/8.  Patient says she is feeling movements, but he just isn't \"wild and crazy\" like usual.  COLE okay discharging her to home, undelivered with instructions on when to return, after a reactive NST.    "

## 2022-10-07 ENCOUNTER — PRENATAL OFFICE VISIT (OUTPATIENT)
Dept: MIDWIFE SERVICES | Facility: CLINIC | Age: 32
End: 2022-10-07
Payer: COMMERCIAL

## 2022-10-07 VITALS
WEIGHT: 161.9 LBS | DIASTOLIC BLOOD PRESSURE: 64 MMHG | BODY MASS INDEX: 30.57 KG/M2 | HEIGHT: 61 IN | SYSTOLIC BLOOD PRESSURE: 92 MMHG

## 2022-10-07 DIAGNOSIS — Z34.83 ENCOUNTER FOR SUPERVISION OF OTHER NORMAL PREGNANCY, THIRD TRIMESTER: Primary | ICD-10-CM

## 2022-10-07 PROCEDURE — 99207 PR PRENATAL VISIT: CPT | Performed by: ADVANCED PRACTICE MIDWIFE

## 2022-10-07 RX ORDER — ACETAMINOPHEN 500 MG
500-1000 TABLET ORAL EVERY 6 HOURS PRN
COMMUNITY
End: 2022-11-17

## 2022-10-07 NOTE — NURSING NOTE
"Chief Complaint   Patient presents with     Prenatal Care     36w 6d, +FM daily. Seen in L&D on 10/4/2022 for decreased FM and contractions. BPP was done,  and the NST at that time was reactive. Reports that she is still having nasima hogan contractions that are irregular with some that are painful.       Initial BP 92/64 (BP Location: Right arm, Cuff Size: Adult Regular)   Ht 1.549 m (5' 1\")   Wt 73.4 kg (161 lb 14.4 oz)   LMP 2021 (Within Weeks)   Breastfeeding No   BMI 30.59 kg/m   Estimated body mass index is 30.59 kg/m  as calculated from the following:    Height as of this encounter: 1.549 m (5' 1\").    Weight as of this encounter: 73.4 kg (161 lb 14.4 oz).  BP completed using cuff size: regular    Questioned patient about current smoking habits.  Pt. has never smoked.        "

## 2022-10-07 NOTE — PROGRESS NOTES
Feeling well.  Baby is active. Denies any leaking of fluid, vaginal bleeding, regular uterine contractions, or headaches or other concerns.  Still having irregular contractions, especially in the evening.  We talked about comfort measures and s/s of labor.  Reviewed to call for contractions, loss of fluid, vaginal bleeding, decreased fetal movement or any other questions or concerns.    RTC in 1 weeks.  Tiffany Curran, SETH, APRN, CNM

## 2022-10-14 ENCOUNTER — PRENATAL OFFICE VISIT (OUTPATIENT)
Dept: MIDWIFE SERVICES | Facility: CLINIC | Age: 32
End: 2022-10-14
Payer: COMMERCIAL

## 2022-10-14 VITALS
SYSTOLIC BLOOD PRESSURE: 108 MMHG | WEIGHT: 160.4 LBS | HEIGHT: 61 IN | BODY MASS INDEX: 30.29 KG/M2 | DIASTOLIC BLOOD PRESSURE: 66 MMHG

## 2022-10-14 DIAGNOSIS — Z34.83 ENCOUNTER FOR SUPERVISION OF OTHER NORMAL PREGNANCY, THIRD TRIMESTER: Primary | ICD-10-CM

## 2022-10-14 PROCEDURE — 99207 PR PRENATAL VISIT: CPT

## 2022-10-14 NOTE — PROGRESS NOTES
"S: Feels tired,  Baby active.  Denies  vaginal bleeding or leaking of fluid. No headache, increase in edema, no epigastric pain.     Endorses increased [place]. patient requesting cervical exam today.       O: Vitals: /66 (BP Location: Left arm, Cuff Size: Adult Regular)   Ht 1.549 m (5' 1\")   Wt 72.8 kg (160 lb 6.4 oz)   LMP 11/29/2021 (Within Weeks)   BMI 30.31 kg/m    BMI= Body mass index is 30.31 kg/m .  Exam:  Constitutional: healthy, alert and no distress  Respiratory: respirations even and unlabored  Gastrointestinal: Abdomen soft, non-tender. Fundus measures appropriate for gestational age. Fetal heart tones hear without difficulty and within normal limits  : Normal external genitalia without lesions  Cervix: ft/20/-3  Psychiatric: mentation appears normal and affect normal/bright  A:     ICD-10-CM    1. Encounter for supervision of other normal pregnancy, third trimester  Z34.83         P: Labor signs and symptoms discussed, aware of numbers to call  Discussed warning signs of PIH/preeclampsia and patient will monitor.  GBS screen completed. Discussed plans for labor. Discussed patient options for postpartum contraception. Patient is planning condoms  Encouraged patient to call with any questions or concerns.  Return to clinic 1 weeks    WAN HERCULES CNM          "

## 2022-10-14 NOTE — NURSING NOTE
"Chief Complaint   Patient presents with     Prenatal Care     37w 6d, +FM daily. Continued Donnie Charles contractions, getting more painful. Would like a cervix check done today.        Initial /66 (BP Location: Left arm, Cuff Size: Adult Regular)   Ht 1.549 m (5' 1\")   Wt 72.8 kg (160 lb 6.4 oz)   LMP 2021 (Within Weeks)   BMI 30.31 kg/m   Estimated body mass index is 30.31 kg/m  as calculated from the following:    Height as of this encounter: 1.549 m (5' 1\").    Weight as of this encounter: 72.8 kg (160 lb 6.4 oz).  BP completed using cuff size: regular    Questioned patient about current smoking habits.  Pt. has never smoked.          "

## 2022-10-16 ENCOUNTER — NURSE TRIAGE (OUTPATIENT)
Dept: NURSING | Facility: CLINIC | Age: 32
End: 2022-10-16

## 2022-10-16 ENCOUNTER — TELEPHONE (OUTPATIENT)
Dept: MIDWIFE SERVICES | Facility: CLINIC | Age: 32
End: 2022-10-16

## 2022-10-16 NOTE — TELEPHONE ENCOUNTER
Patient is 38 weeks and BABATUNDE is 10/29/22   Patient is having some contractions for the past 2-3 hours  Contractions between 5-15 and is not regular  This is patient second baby  Rates pain 7/10 with walking and has to take baby steps to ease the pain  Patient says babies movement is making the pain worst  Triage guidelines recommend to go to L&D now  Caller verbalized and understands directives  OB Triage Call      Is patient's OB/[facility]wife with the [facility]? [facility]- Proceed with triage     Reason for call: Abdominal pain and contractions    Assessment: increase pain with baby movement    Plan: go to L&D      Patient plans to deliver at [facility]     Patient's primary OB Provider is Jessica.      Per protocol recommendations Patient to be evaluated in L&D. Patient's primary OB is [facility]. Paged on-call midwife for patient's primary OB clinic (refer to where patient is seen as midwives may go to multiple locations) Haley Mayberry to call FNA back at [phone].  Call returned at 520pm and advised on triage assessment. Does midwife recommend L&D evaluation? Yes-  Labor and delivery at [facility] ([phone]) notified of patient's pending arrival. Patient notified to go to L&D by Midwife       Is patient's delivering hospital on divert? No      38w1d    Estimated Date of Delivery: Oct 29, 2022        OB History    Para Term  AB Living   2 1 1 0 0 1   SAB IAB Ectopic Multiple Live Births   0 0 0 0 1      # Outcome Date GA Lbr Tip/2nd Weight Sex Delivery Anes PTL Lv   2 Current            1 Term 19 40w4d 04:00 / 02:27 2.835 kg (6 lb 4 oz) M Vag-Spont EPI, Local N NEHEMIAS      Name: ROVERTO,MALE-ASHLEY      Apgar1: 8  Apgar5: 9       Lab Results   Component Value Date    GBS Negative 2019          Elyse Pedroza, RN 10/16/22 5:13 PM  [facility] Nurse Advisor    Reason for Disposition    [1] History of prior delivery (multipara) AND [2] contractions < 10 minutes  "apart AND [3] present 1 hour    Additional Information    Negative: Passed out (i.e., lost consciousness, collapsed and was not responding)    Negative: Shock suspected (e.g., cold/pale/clammy skin, too weak to stand, low BP, rapid pulse)    Negative: Difficult to awaken or acting confused (e.g., disoriented, slurred speech)    Negative: [1] SEVERE abdominal pain (e.g., excruciating) AND [2] constant AND [3] present > 1 hour    Negative: Severe bleeding (e.g., continuous red blood from vagina, or large blood clots)    Negative: Umbilical cord hanging out of the vagina (shiny, white, curled appearance, \"like telephone cord\")    Negative: Uncontrollable urge to push (i.e., feels like baby is coming out now)    Negative: Can see baby    Negative: Sounds like a life-threatening emergency to the triager    Negative: Pregnant < 37 weeks (i.e., )    Negative: [1] Uncertain delivery date AND [2] possibly pregnant < 37 weeks (i.e., )    Negative: [1] First baby (primipara) AND [2] contractions < 6 minutes apart  AND [3] present 2 hours    Protocols used: PREGNANCY - LABOR-A-AH      "

## 2022-10-17 NOTE — TELEPHONE ENCOUNTER
"Triage Call:    Patient calling back to update midwife of status.  She reports after taking a hot shower, \"the pain is not as bad\".  She reports no pain while laying down, she reports 4/10 constant lower abdominal/back/vaginal pressure with standing. Patient reports \"soreness\" with fetal movement, denies contractions. Patient denies vaginal bleeding or discharge.  She reports she plans to monitor at home and will come in if the pain gets worse. \"Doesn't feel necessary to come in now.\"    Paged Midwife, Haley Mayberry, at 2022 for an update. Returned call at 2023.    Zainab Waddell RN  10/16/22 8:26 PM  [facility] Nurse Advisor        Reason for Disposition    Caller has already spoken with another triager and has no further questions.    Additional Information    Negative: Caller is angry or rude (e.g., hangs up, verbally abusive, yelling)    Negative: Caller hangs up    Negative: Caller has already spoken with the PCP and has no further questions.    Protocols used: NO CONTACT OR DUPLICATE CONTACT CALL-A-AH      "

## 2022-10-17 NOTE — TELEPHONE ENCOUNTER
No answer. Per phone information notes, ok to leave detailed message. Left message that provider was calling to check in on patient as she had call triage RN about 2.5 hours ago with complaint of contractions and plan was to come in for LD for evaluation but patient has not presented to triage. Advised to call back.    WAN Garcia CNM

## 2022-10-19 ENCOUNTER — TELEPHONE (OUTPATIENT)
Dept: MIDWIFE SERVICES | Facility: CLINIC | Age: 32
End: 2022-10-19

## 2022-10-19 NOTE — TELEPHONE ENCOUNTER
Pt calling back.  Just got home from work, took a hot shower.   Still having pain but irregular, about 7-30 minutes apart.    Hot shower helped a bit.    Advised to continue to monitor.  If ctx are 5-10 minutes apart consistently she will CB or for LOF or VB or any other concerns.    Graciela Kramer RN

## 2022-10-19 NOTE — TELEPHONE ENCOUNTER
Pt calling with low abdominal period like cramping since around 10am.  Intermittent but not super regular. About 7-12 minutes apart.  Denies LOF or VB, normal FM.    Advised to monitor for the next 1-2 hours and CB with an update or sooner for LOF or bright red vaginal bleeding.    Graciela Kramer RN

## 2022-10-21 ENCOUNTER — PRENATAL OFFICE VISIT (OUTPATIENT)
Dept: MIDWIFE SERVICES | Facility: CLINIC | Age: 32
End: 2022-10-21
Payer: COMMERCIAL

## 2022-10-21 VITALS
BODY MASS INDEX: 30.23 KG/M2 | DIASTOLIC BLOOD PRESSURE: 72 MMHG | HEIGHT: 61 IN | SYSTOLIC BLOOD PRESSURE: 118 MMHG | WEIGHT: 160.1 LBS

## 2022-10-21 DIAGNOSIS — Z34.83 ENCOUNTER FOR SUPERVISION OF OTHER NORMAL PREGNANCY, THIRD TRIMESTER: Primary | ICD-10-CM

## 2022-10-21 PROCEDURE — 99207 PR PRENATAL VISIT: CPT | Performed by: ADVANCED PRACTICE MIDWIFE

## 2022-10-21 NOTE — PATIENT INSTRUCTIONS
"Labor Instructions for Midwife Patients    When to call:  Both during and after office hours call [phone]. There is a Nurse Midwife available to take your calls and answer your questions 24 hours a day.     When to call:  Call anytime you have important concerns about you or your baby.     Call if:  You are having contractions at regular intervals about 5-6 minutes apart lasting 30-60 seconds and becoming increasingly more intense   You have an uncontrollable gush of fluid from your vagina or feel a pop and gush like your water has broken  You have HEAVY bleeding, like heavy period, blood running down your legs, or  soaking a pad.   Some bleeding after a pelvic exam, after intercourse, or in labor when your cervix is dilating is normal and is referred to as \"bloody show\"  You have severe, continuous back or abdominal pain  You feel it is time to go to the hospital  If this is your first labor, call when contractions are very intense and have been about every 3-4 minutes for about an hour  If it is your second labor or more, call when contractions are strong and about every 3-5 minutes or sooner depending on your level of discomfort.     Keep in mind we are always here for you! If you have questions, concerns please don't hesitate to call us.     What to eat/drink in labor: Drink plenty of fluid (water most importantly, juice, soda or tea without caffeine). Eat rice, pasta, soup, cereal, bread/toast, and fruit. Avoid dairy and greasy food as they are difficult to digest and you may experience some nausea during labor.    Comfort measures:  Baths and showers (ok even with ruptured membranes, it may temporarily slow contractions if you are still in the early stage of labor)  Warm/hot packs for back pain or discomfort  Back, belly, or thigh massages  Standing, rocking, walking, leaning over bed or tables, side-lying and sleeping    Miscellaneous:   Contractions are timed from the beginning of one to the beginning " of the next  Try hard to sleep during the early stage of labor when you are not that uncomfortable. Timing of contractions at this point is not important  Even if you cannot sleep, resting in bed or on the couch can help you maintain your energy for labor  When you arrive at the hospital the nurse will check your baby's heartbeat, check your cervix, and will call us. The midwife on call will come in and be with you when you are in active labor  After hours you need to enter the hospital through the emergency room        Post Dates Management    If you've gone beyond your due date you are probably thinking when is this baby coming!  Most babies are born on or after their due date so it is nothing to worry about.    If you are pregnant at 41 weeks we will start some increased monitoring to make sure that your baby and the placenta are healthy enough to continue your pregnancy.    We would have you come to the clinic every 3-4 days to be assessed with an ultrasound called a Biophysical profile (BPP).     This tells us how your baby is doing. We monitor fetal movement, breathing patterns, amniotic fluid level, and heart rate.     Research has shown that by 42 weeks gestation the placenta is not always healthy enough to support the pregnancy further and it is better for the baby to be born.  If you are still pregnant by 41 and a half weeks we will discuss induction of labor with you and the different options available.

## 2022-10-21 NOTE — PROGRESS NOTES
"S: Feels well, has been experiencing some nasima hogan and is ready for baby any time. She would also like to have her cervix checked today. Baby active.  Denies uterine cramping, vaginal bleeding or leaking of fluid. No headache, increase in edema, no epigastric pain.   O: Vitals: /72 (BP Location: Right arm, Cuff Size: Adult Regular)   Ht 1.549 m (5' 1\")   Wt 72.6 kg (160 lb 1.6 oz)   LMP 11/29/2021 (Within Weeks)   BMI 30.25 kg/m    BMI= Body mass index is 30.25 kg/m .     Exam:  Constitutional: healthy, alert and no distress  Respiratory: respirations even and unlabored  Gastrointestinal: Abdomen soft, non-tender. Fundus measures appropriate for gestational age. Fetal heart tones hear without difficulty and within normal limits  : cervix 1cm/50%/-2/soft/mid, quiroz score 6  Psychiatric: mentation appears normal and affect normal/bright    Assessment      ICD-10-CM    1. Encounter for supervision of other normal pregnancy, third trimester  Z34.83         P: Labor signs and symptoms discussed, aware of numbers to call. Cervical exam 1/50/-2. Patient plans to wait for spontaneous labor and probability of induction of labor after 41 weeks   Discussed warning signs of PIH/preeclampsia and patient will monitor.  GBS screen completed. Discussed plans for labor. Discussed patient options for postpartum contraception. Patient is planning condoms   Encouraged patient to call with any questions or concerns.  Return to clinic 1 weeks    Marnie Washington, student COLE      I was present with the student who participated in the service and documentation of the services provided. I have verified the history and personally performed the physical exam and medical decision making as documented by the student and edited by me.  WAN Boateng CNM 10/21/2022 11:01 AM       "

## 2022-10-21 NOTE — NURSING NOTE
"Chief Complaint   Patient presents with     Prenatal Care     39w 6d, +FM daily       Initial /72 (BP Location: Right arm, Cuff Size: Adult Regular)   Ht 1.549 m (5' 1\")   Wt 72.6 kg (160 lb 1.6 oz)   LMP 2021 (Within Weeks)   BMI 30.25 kg/m   Estimated body mass index is 30.25 kg/m  as calculated from the following:    Height as of this encounter: 1.549 m (5' 1\").    Weight as of this encounter: 72.6 kg (160 lb 1.6 oz).  BP completed using cuff size: regular    Questioned patient about current smoking habits.  Pt. has never smoked.          "

## 2022-10-26 ENCOUNTER — NURSE TRIAGE (OUTPATIENT)
Dept: NURSING | Facility: CLINIC | Age: 32
End: 2022-10-26

## 2022-10-26 ENCOUNTER — TELEPHONE (OUTPATIENT)
Dept: OBGYN | Facility: CLINIC | Age: 32
End: 2022-10-26

## 2022-10-26 ENCOUNTER — HOSPITAL ENCOUNTER (OUTPATIENT)
Facility: CLINIC | Age: 32
Discharge: HOME | End: 2022-10-26
Attending: REGISTERED NURSE | Admitting: REGISTERED NURSE
Payer: COMMERCIAL

## 2022-10-26 VITALS
DIASTOLIC BLOOD PRESSURE: 75 MMHG | HEIGHT: 61 IN | RESPIRATION RATE: 18 BRPM | BODY MASS INDEX: 30.21 KG/M2 | WEIGHT: 160 LBS | TEMPERATURE: 98 F | SYSTOLIC BLOOD PRESSURE: 124 MMHG

## 2022-10-26 PROCEDURE — 59025 FETAL NON-STRESS TEST: CPT | Mod: 26 | Performed by: REGISTERED NURSE

## 2022-10-26 PROCEDURE — 999N000105 HC STATISTIC NO DOCUMENTATION TO SUPPORT CHARGE

## 2022-10-26 PROCEDURE — G0463 HOSPITAL OUTPT CLINIC VISIT: HCPCS | Mod: 25

## 2022-10-26 PROCEDURE — 99213 OFFICE O/P EST LOW 20 MIN: CPT | Mod: 25 | Performed by: REGISTERED NURSE

## 2022-10-26 PROCEDURE — 59025 FETAL NON-STRESS TEST: CPT | Mod: 59

## 2022-10-26 PROCEDURE — 250N000011 HC RX IP 250 OP 636: Performed by: REGISTERED NURSE

## 2022-10-26 RX ORDER — ONDANSETRON 4 MG/1
4 TABLET, ORALLY DISINTEGRATING ORAL ONCE
Status: COMPLETED | OUTPATIENT
Start: 2022-10-26 | End: 2022-10-26

## 2022-10-26 RX ORDER — LIDOCAINE 40 MG/G
CREAM TOPICAL
Status: DISCONTINUED | OUTPATIENT
Start: 2022-10-26 | End: 2022-10-26 | Stop reason: HOSPADM

## 2022-10-26 RX ADMIN — ONDANSETRON 4 MG: 4 TABLET, ORALLY DISINTEGRATING ORAL at 09:22

## 2022-10-26 ASSESSMENT — ACTIVITIES OF DAILY LIVING (ADL): ADLS_ACUITY_SCORE: 35

## 2022-10-26 NOTE — PROVIDER NOTIFICATION
10/26/22 0918   Provider Notification   Provider Name/Title COLE De Leon   Method of Notification Phone   Request Evaluate - Remote   Notification Reason Patient Arrived     CNM updated on patient arrival.  She is a  at 39.4 weeks.  She came in for decreased fetal movement stating she hasn't felt the baby move since last evening.  She tried cold and warm compresses, eating fruit, drinking juice, and lying on one side.  She also says she has had more nausea and vomiting episodes lately and was up a couple of times last night vomiting, but she did forget her zofran at work yesterday and is requesting a dose for now since it usually works pretty well for her.  FHT category 1, she's had one contraction since arrival that picked up with the toco, but did not palpate.  The patient says she has had more abdominal soreness lately as well.  She said some days it feels like she's been working out, other days it is nasima hogan contractions that usually go away after a hot shower. SVE per patient request: 3, unchanged from Friday. Pregnancy and health history have both been uncomplicated.  CNM okay with 4 mg PO zofran to be given, when patient feels reassured and she starts feeling movement again, she can be discharged to home, undelivered with instructions on when to return.

## 2022-10-26 NOTE — PLAN OF CARE
Data: [facility]  8:51 AM.  Reason for maternal/fetal assessment is decreased fetal movement and nausea/vomiting. Patient reports she has not felt baby move since yesterday evening, despite drinking juice, eating fruit, cold and warm compresses, and laying on one side.  She also hasn't been able to eat much lately and was up vomiting a couple of times throughout the night. Patient is a .  Prenatal record reviewed. Pregnancy has been uncomplicated..  Gestational Age 39w4d. VSS. Fetal movement decreased since last evening. Patient denies uterine contractions, vaginal bleeding, pelvic pressure, headache, visual disturbances, epigastric or URQ pain, significant edema. Support person is not present.   Action: Verbal consent for EFM. Triage assessment completed. Bill of rights reviewed.  Response: Patient verbalized agreement with plan. Will contact COLE Mayberry with update and for further orders.

## 2022-10-26 NOTE — TELEPHONE ENCOUNTER
"39w4d Triage call  Called back and identified pt with two identifiers.  Reports decreased fetal movement and increased pelvic pain with \"nasima-hogan\" contractions.  No bleeding, increased \"creamy-white\" discharge.   Encouraged pt to come to L&D for evaluation.  Pt will come within the hour; L&D notified.    WAN Eisenberg CNM    "

## 2022-10-26 NOTE — TELEPHONE ENCOUNTER
"    OB Triage Call      Is patient's OB/Midwife with the [facility]? [facility]- Proceed with triage     Reason for call: abdominal pain, decreased fetal movement    Assessment: Patient reports that for the past few days she has been having abdominal pain in her lower abdomen. She describes the pain as constant but that it comes and goes. She does not feel that it \"comes and goes\" like contractions. Pain is rated anywhere from 3-7 depending on the time. She denies any vaginal bleeding or leakage of fluid. Patient does report having an increase in cream colored mucous discharge. She is also concerned that baby has been moving less the last few days, currently she has not felt baby move since yesterday, denies feeling any movement during the night or this morning.     Plan: Go to L&D per protocol, patient is unable to go anywhere for about an hour so would like to discuss with the on call midwife. So Ivy CNM paged at 7:42, return call received at 7:47. So will call patient to discuss symptoms further.     Patient plans to deliver at [facility]     Patient's primary OB Provider is Midwives.      Per protocol recommendations Patient to be evaluated in L&D. Patient's primary OB is [facility]. Paged on-call midwife for patient's primary OB clinic (refer to where patient is seen as midwives may go to multiple locations) So Ivy to call FNA back at 7:42.  Call returned at 7:47 and advised on triage assessment. Does midwife recommend L&D evaluation? Unknown- COLE contacted patient directly to develop plan of care. No further actions needed at this time.       Is patient's delivering hospital on divert? No      39w4d    Estimated Date of Delivery: Oct 29, 2022        OB History    Para Term  AB Living   2 1 1 0 0 1   SAB IAB Ectopic Multiple Live Births   0 0 0 0 1      # Outcome Date GA Lbr Tip/2nd Weight Sex Delivery Anes PTL Lv   2 Current            1 Term 19 40w4d 04:00 " / 02:27 2.835 kg (6 lb 4 oz) M Vag-Spont EPI, Local N NEHEMIAS      Name: FERMIN LAYNE-ASHLEY      Apgar1: 8  Apgar5: 9       Lab Results   Component Value Date    GBS Negative 06/21/2019          Kaylah Ba RN 10/26/22 7:24 AM  [facility] Nurse Advisor    Reason for Disposition    [1] Pregnant 23 or more weeks AND [2] no movement of baby > 8 hours    Additional Information    Negative: Sounds like a life-threatening emergency to the triager    Negative: Injury to abdomen    Negative: [1] Pregnant > 36 weeks AND [2] having contractions or other symptoms of labor    Negative: [1] Pregnant < 37 weeks AND [2] having contractions or other symptoms of labor    Negative: [1] Pregnant > 20 weeks AND [2] abdominal pain    Negative: [1] Pregnant > 20 weeks AND [2] vaginal bleeding or spotting    Negative: New blurred vision or vision changes    Negative: [1] SEVERE headache AND [2] not relieved with acetaminophen (e.g., Tylenol)    Negative: Leakage of fluid from vagina    Negative: [1] Pregnant 23 or more weeks AND [2] baby moving less today by kick count  (e.g., kick count < 5 in 1 hour or < 10 in 2 hours)    Negative: [1] Pregnant 23 or more weeks AND [2] baby moving less today AND [3] unable or unwilling to perform kick count    Negative: [1] Pregnant 23 or more weeks AND [2] normal kick count BUT [3] mother still thinks there is something wrong    Protocols used: PREGNANCY - DECREASED FETAL MOVEMENT-A-AH

## 2022-10-26 NOTE — DISCHARGE INSTRUCTIONS
Discharge Instruction for Undelivered Patients      You were seen for: fetal assessment  We Consulted: COLE De Leon  You had (Test or Medicine):Fetal and uterine monitoring, zofran     Diet:   Drink 8 to 12 glasses of liquids (milk, juice, water) every day.  You may eat meals and snacks.     Activity:  Count fetal kicks everyday (see handout)  Call your doctor or nurse midwife if your baby is moving less than usual.     Call your provider if you notice:  Swelling in your face or increased swelling in your hands or legs.  Headaches that are not relieved by Tylenol (acetaminophen).  Changes in your vision (blurring: seeing spots or stars.)  Nausea (sick to your stomach) and vomiting (throwing up).   Weight gain of 5 pounds or more per week.  Heartburn that doesn't go away.  Signs of bladder infection: pain when you urinate (use the toilet), need to go more often and more urgently.  The bag of busch (rupture of membranes) breaks, or you notice leaking in your underwear.  Bright red blood in your underwear.  Abdominal (lower belly) or stomach pain.  For first baby: Contractions (tightening) less than 5 minutes apart for one hour or more.  Second (plus) baby: Contractions (tightening) less than 10 minutes apart and getting stronger.  *If less than 34 weeks: Contractions (tightening) more than 6 times in one hour.  Increase or change in vaginal discharge (note the color and amount)  Other: call your doctor or midwife with any questions or concerns.    Follow-up:  As scheduled in the clinic

## 2022-10-26 NOTE — PROGRESS NOTES
"[facility] C[facility] CNM TRIAGE NOTE    Agustin Ortega is a 31 year old  with and IUP at 39w4d who presents with complaint of decreased fetal movement. Has had nausea in pregnancy, controlled with zofran. She left her zofran at work. Was vomiting last night. Also thinks she has been losing some of her cervical mucous plug. Occasionally has more egg-white consistency whitish discharge. No s/s of infection.     Timi is here with her and supportive.    Patient states baby is active.  Denies ROM   Denies vaginal bleeding  Present OB History at [facility] with the CNMs.     Patient Active Problem List   Diagnosis     GERD (gastroesophageal reflux disease)     CARDIOVASCULAR SCREENING; LDL GOAL LESS THAN 160     Indication for care in labor or delivery     Encounter for triage in pregnant patient     Normal labor and delivery     Low vitamin D level     Nausea and vomiting in pregnancy prior to 22 weeks gestation     COVID-19 affecting pregnancy in second trimester     Decreased fetal movements in third trimester       ROS:  Patient is alert and oriented      PHYSICAL EXAM:  /75 (BP Location: Left arm, Patient Position: Semi-Baldwin's, Cuff Size: Adult Regular)   Temp 98  F (36.7  C) (Oral)   Resp 18   Ht 1.549 m (5' 1\")   Wt 72.6 kg (160 lb)   LMP 2021 (Within Weeks)   BMI 30.23 kg/m      FHT's 135 with moderate variability  Accelerations: present   Decelerations:  absent        Contractions: Pt is not tia     Abdomen: gravid, non-tender  Bloody show: no  Cervix: 1/ 50%/ Posterior/ average/ -3 (per RN assessment)   Membranes are intact       ASSESSMENT :   31 year old  with lacy IUP 39w4d with decreased fetal movement   NST  reactive  GBS negative and membranes intact      PLAN:    - Was able to feel baby moving while monitoring- NST reactive. Reviewed reassuring cat I fetal heart tracing  - PO zofran now- was starting to feel better after taking and able to sip " on orange juice and eat crackers/cookies  - Wanted to know next steps if baby does not come by 41 weeks. Discussed that she will talk more at her 40 week appointment on Friday but that we offer IOL at 41 week or if she declines then twice weekly clinic and BPP appointments with IOL at 42 weeks if baby hasn't come.  - Also had questions about lower platelet level seen on her CBC 3 weeks ago and if this would impact her ability to get an epidural. Reviewed gestational thrombocytopenia and generally a level above 80,000 is needed for epidural and that we will check her CBC at her next appointment.     Discharge home  Continue routine prenatal care      Teaching done r/t to s/s of labor, SROM, decreased fetal movement, comfort measures in third trimester.  Instructed to please refer to the discharge handouts, the RN triage line or on-call CNM for any questions or concerns.  Pt verbalizes understanding and agreement with current plan of care.    Haley Mayberry, WAN CNM

## 2022-10-26 NOTE — PLAN OF CARE
Data: Patient assessed in the [facility] for decreased fetal movement.  Cervical exam posterior, dilated to 1, effaced to 50 and -3.  Membranes intact.  Contractions/uterine assessment x1 contraction picked up with toco, not able to palpate.  Action:  Presumed adequate fetal oxygenation documented (see flow record). Discharge instructions reviewed.  Patient instructed to report change in fetal movement, vaginal leaking of fluid or bleeding, abdominal pain, or any concerns related to the pregnancy to her nurse/physician.    Response: Orders to discharge home per COLE De Leon .  Patient verbalized understanding of education and verbalized agreement with plan. Discharged to home at 1010.

## 2022-10-28 ENCOUNTER — PRENATAL OFFICE VISIT (OUTPATIENT)
Dept: MIDWIFE SERVICES | Facility: CLINIC | Age: 32
End: 2022-10-28
Payer: COMMERCIAL

## 2022-10-28 VITALS — WEIGHT: 160 LBS | DIASTOLIC BLOOD PRESSURE: 60 MMHG | SYSTOLIC BLOOD PRESSURE: 110 MMHG | BODY MASS INDEX: 30.23 KG/M2

## 2022-10-28 DIAGNOSIS — O48.0 POST-TERM PREGNANCY, 40-42 WEEKS OF GESTATION: ICD-10-CM

## 2022-10-28 DIAGNOSIS — O99.013 ANEMIA IN PREGNANCY, THIRD TRIMESTER: Primary | ICD-10-CM

## 2022-10-28 LAB
ERYTHROCYTE [DISTWIDTH] IN BLOOD BY AUTOMATED COUNT: 14.4 % (ref 10–15)
HCT VFR BLD AUTO: 31.7 % (ref 35–47)
HGB BLD-MCNC: 10.8 G/DL (ref 11.7–15.7)
MCH RBC QN AUTO: 28.6 PG (ref 26.5–33)
MCHC RBC AUTO-ENTMCNC: 34.1 G/DL (ref 31.5–36.5)
MCV RBC AUTO: 84 FL (ref 78–100)
PLATELET # BLD AUTO: 128 10E3/UL (ref 150–450)
RBC # BLD AUTO: 3.77 10E6/UL (ref 3.8–5.2)
WBC # BLD AUTO: 5.1 10E3/UL (ref 4–11)

## 2022-10-28 PROCEDURE — 85027 COMPLETE CBC AUTOMATED: CPT

## 2022-10-28 PROCEDURE — 36415 COLL VENOUS BLD VENIPUNCTURE: CPT

## 2022-10-28 PROCEDURE — 99207 PR PRENATAL VISIT: CPT

## 2022-10-28 NOTE — PROGRESS NOTES
S: Feels tired, doing well overall.  Baby active.  Denies uterine cramping, vaginal bleeding or leaking of fluid. No headache, increase in edema, no epigastric pain.   O: Vitals: /60 (BP Location: Left arm, Cuff Size: Adult Regular)   Wt 72.6 kg (160 lb)   LMP 11/29/2021 (Within Weeks)   BMI 30.23 kg/m    BMI= Body mass index is 30.23 kg/m .  Exam:  Constitutional: healthy, alert and no distress  Respiratory: respirations even and unlabored  Gastrointestinal: Abdomen soft, non-tender. Fundus measures appropriate for gestational age. Fetal heart tones hear without difficulty and within normal limits  : Normal external genitalia without lesions  Cervix: 1/50/-2 posterior   Psychiatric: mentation appears normal and affect normal/bright  A:     ICD-10-CM    1. Anemia in pregnancy, third trimester  O99.013         P: Labor signs and symptoms discussed, aware of numbers to call.  Discussed warning signs of PIH/preeclampsia and patient will monitor.  GBS screen completed. Discussed plans for labor. Discussed patient options for postpartum contraception. Patient is planning condoms  Discussed postdates options.  Scheduled BPP on 10/31  Encouraged patient to call with any questions or concerns.  Return to clinic 1 weeks    WAN HERCULES CNM

## 2022-10-28 NOTE — NURSING NOTE
"Chief Complaint   Patient presents with     Prenatal Care     39w 5d       Initial /60 (BP Location: Left arm, Cuff Size: Adult Regular)   Wt 72.6 kg (160 lb)   LMP 2021 (Within Weeks)   BMI 30.23 kg/m   Estimated body mass index is 30.23 kg/m  as calculated from the following:    Height as of 10/26/22: 1.549 m (5' 1\").    Weight as of this encounter: 72.6 kg (160 lb).  BP completed using cuff size: regular    Questioned patient about current smoking habits.  Pt. has never smoked.          The following HM Due: NONE         "

## 2022-10-29 ENCOUNTER — HOSPITAL ENCOUNTER (INPATIENT)
Facility: CLINIC | Age: 32
LOS: 2 days | Discharge: HOME | End: 2022-11-01
Payer: COMMERCIAL

## 2022-10-29 ENCOUNTER — NURSE TRIAGE (OUTPATIENT)
Dept: NURSING | Facility: CLINIC | Age: 32
End: 2022-10-29

## 2022-10-29 PROCEDURE — G0463 HOSPITAL OUTPT CLINIC VISIT: HCPCS

## 2022-10-29 RX ORDER — MORPHINE SULFATE 10 MG/ML
10 INJECTION, SOLUTION INTRAMUSCULAR; INTRAVENOUS
Status: DISCONTINUED | OUTPATIENT
Start: 2022-10-29 | End: 2022-10-30

## 2022-10-29 RX ORDER — HYDROXYZINE HYDROCHLORIDE 50 MG/1
100 TABLET, FILM COATED ORAL
Status: COMPLETED | OUTPATIENT
Start: 2022-10-29 | End: 2022-10-30

## 2022-10-29 RX ORDER — LIDOCAINE 40 MG/G
CREAM TOPICAL
Status: DISCONTINUED | OUTPATIENT
Start: 2022-10-29 | End: 2022-10-30 | Stop reason: HOSPADM

## 2022-10-29 ASSESSMENT — ACTIVITIES OF DAILY LIVING (ADL)
CONCENTRATING,_REMEMBERING_OR_MAKING_DECISIONS_DIFFICULTY: NO
CHANGE_IN_FUNCTIONAL_STATUS_SINCE_ONSET_OF_CURRENT_ILLNESS/INJURY: NO
DIFFICULTY_COMMUNICATING: NO
DOING_ERRANDS_INDEPENDENTLY_DIFFICULTY: NO
WEAR_GLASSES_OR_BLIND: YES
FALL_HISTORY_WITHIN_LAST_SIX_MONTHS: NO
WALKING_OR_CLIMBING_STAIRS_DIFFICULTY: NO
TOILETING_ISSUES: NO
VISION_MANAGEMENT: CONTACTS
HEARING_DIFFICULTY_OR_DEAF: NO
DRESSING/BATHING_DIFFICULTY: NO
DIFFICULTY_EATING/SWALLOWING: NO

## 2022-10-30 ENCOUNTER — ANESTHESIA (OUTPATIENT)
Dept: OBGYN | Facility: CLINIC | Age: 32
End: 2022-10-30
Payer: COMMERCIAL

## 2022-10-30 ENCOUNTER — ANESTHESIA EVENT (OUTPATIENT)
Dept: OBGYN | Facility: CLINIC | Age: 32
End: 2022-10-30
Payer: COMMERCIAL

## 2022-10-30 PROBLEM — Z37.9 NORMAL LABOR: Status: ACTIVE | Noted: 2022-10-30

## 2022-10-30 LAB
ABO/RH(D): NORMAL
ALBUMIN UR-MCNC: 50 MG/DL
ANTIBODY SCREEN: NEGATIVE
APPEARANCE UR: ABNORMAL
BACTERIA #/AREA URNS HPF: ABNORMAL /HPF
BILIRUB UR QL STRIP: NEGATIVE
COLOR UR AUTO: YELLOW
GLUCOSE UR STRIP-MCNC: NEGATIVE MG/DL
HGB UR QL STRIP: ABNORMAL
HOLD SPECIMEN: NORMAL
HOLD SPECIMEN: NORMAL
HYALINE CASTS: 2 /LPF
KETONES UR STRIP-MCNC: 40 MG/DL
LEUKOCYTE ESTERASE UR QL STRIP: ABNORMAL
MUCOUS THREADS #/AREA URNS LPF: PRESENT /LPF
NITRATE UR QL: NEGATIVE
PH UR STRIP: 6 [PH] (ref 5–7)
RBC URINE: 2 /HPF
SARS-COV-2 RNA RESP QL NAA+PROBE: NEGATIVE
SP GR UR STRIP: 1.03 (ref 1–1.03)
SPECIMEN EXPIRATION DATE: NORMAL
SQUAMOUS EPITHELIAL: 27 /HPF
T PALLIDUM AB SER QL: NONREACTIVE
UROBILINOGEN UR STRIP-MCNC: 2 MG/DL
WBC URINE: 11 /HPF

## 2022-10-30 PROCEDURE — 96372 THER/PROPH/DIAG INJ SC/IM: CPT

## 2022-10-30 PROCEDURE — 59400 OBSTETRICAL CARE: CPT

## 2022-10-30 PROCEDURE — 250N000011 HC RX IP 250 OP 636: Performed by: ANESTHESIOLOGY

## 2022-10-30 PROCEDURE — U0003 INFECTIOUS AGENT DETECTION BY NUCLEIC ACID (DNA OR RNA); SEVERE ACUTE RESPIRATORY SYNDROME CORONAVIRUS 2 (SARS-COV-2) (CORONAVIRUS DISEASE [COVID-19]), AMPLIFIED PROBE TECHNIQUE, MAKING USE OF HIGH THROUGHPUT TECHNOLOGIES AS DESCRIBED BY CMS-2020-01-R: HCPCS

## 2022-10-30 PROCEDURE — 86850 RBC ANTIBODY SCREEN: CPT

## 2022-10-30 PROCEDURE — 86901 BLOOD TYPING SEROLOGIC RH(D): CPT

## 2022-10-30 PROCEDURE — 250N000009 HC RX 250

## 2022-10-30 PROCEDURE — 250N000013 HC RX MED GY IP 250 OP 250 PS 637

## 2022-10-30 PROCEDURE — 3E0R33Z INTRODUCTION OF ANTI-INFLAMMATORY INTO SPINAL CANAL, PERCUTANEOUS APPROACH: ICD-10-PCS

## 2022-10-30 PROCEDURE — 250N000011 HC RX IP 250 OP 636

## 2022-10-30 PROCEDURE — 3E0R3BZ INTRODUCTION OF ANESTHETIC AGENT INTO SPINAL CANAL, PERCUTANEOUS APPROACH: ICD-10-PCS

## 2022-10-30 PROCEDURE — 0KQM0ZZ REPAIR PERINEUM MUSCLE, OPEN APPROACH: ICD-10-PCS

## 2022-10-30 PROCEDURE — 250N000009 HC RX 250: Performed by: ANESTHESIOLOGY

## 2022-10-30 PROCEDURE — 81001 URINALYSIS AUTO W/SCOPE: CPT

## 2022-10-30 PROCEDURE — 258N000003 HC RX IP 258 OP 636

## 2022-10-30 PROCEDURE — 120N000001 HC R&B MED SURG/OB

## 2022-10-30 PROCEDURE — 86780 TREPONEMA PALLIDUM: CPT

## 2022-10-30 PROCEDURE — 370N000003 HC ANESTHESIA WARD SERVICE

## 2022-10-30 PROCEDURE — 36415 COLL VENOUS BLD VENIPUNCTURE: CPT

## 2022-10-30 PROCEDURE — 87086 URINE CULTURE/COLONY COUNT: CPT

## 2022-10-30 PROCEDURE — 722N000001 HC LABOR CARE VAGINAL DELIVERY SINGLE

## 2022-10-30 RX ORDER — LIDOCAINE 40 MG/G
CREAM TOPICAL
Status: DISCONTINUED | OUTPATIENT
Start: 2022-10-30 | End: 2022-10-30 | Stop reason: HOSPADM

## 2022-10-30 RX ORDER — MISOPROSTOL 200 UG/1
800 TABLET ORAL
Status: DISCONTINUED | OUTPATIENT
Start: 2022-10-30 | End: 2022-11-01 | Stop reason: HOSPADM

## 2022-10-30 RX ORDER — NALOXONE HYDROCHLORIDE 0.4 MG/ML
0.2 INJECTION, SOLUTION INTRAMUSCULAR; INTRAVENOUS; SUBCUTANEOUS
Status: DISCONTINUED | OUTPATIENT
Start: 2022-10-30 | End: 2022-10-30 | Stop reason: HOSPADM

## 2022-10-30 RX ORDER — ONDANSETRON 2 MG/ML
4 INJECTION INTRAMUSCULAR; INTRAVENOUS EVERY 6 HOURS PRN
Status: DISCONTINUED | OUTPATIENT
Start: 2022-10-30 | End: 2022-10-30 | Stop reason: HOSPADM

## 2022-10-30 RX ORDER — LIDOCAINE HYDROCHLORIDE AND EPINEPHRINE 15; 5 MG/ML; UG/ML
INJECTION, SOLUTION EPIDURAL PRN
Status: DISCONTINUED | OUTPATIENT
Start: 2022-10-30 | End: 2022-10-30

## 2022-10-30 RX ORDER — ONDANSETRON 4 MG/1
4 TABLET, ORALLY DISINTEGRATING ORAL EVERY 6 HOURS PRN
Status: DISCONTINUED | OUTPATIENT
Start: 2022-10-30 | End: 2022-10-30 | Stop reason: HOSPADM

## 2022-10-30 RX ORDER — FENTANYL/BUPIVACAINE/NS/PF 2-1250MCG
PLASTIC BAG, INJECTION (ML) INJECTION
Status: DISCONTINUED
Start: 2022-10-30 | End: 2022-10-30 | Stop reason: HOSPADM

## 2022-10-30 RX ORDER — KETOROLAC TROMETHAMINE 30 MG/ML
30 INJECTION, SOLUTION INTRAMUSCULAR; INTRAVENOUS
Status: DISCONTINUED | OUTPATIENT
Start: 2022-10-30 | End: 2022-11-01 | Stop reason: CLARIF

## 2022-10-30 RX ORDER — TRANEXAMIC ACID 10 MG/ML
1 INJECTION, SOLUTION INTRAVENOUS EVERY 30 MIN PRN
Status: DISCONTINUED | OUTPATIENT
Start: 2022-10-30 | End: 2022-11-01 | Stop reason: HOSPADM

## 2022-10-30 RX ORDER — TRANEXAMIC ACID 10 MG/ML
1 INJECTION, SOLUTION INTRAVENOUS EVERY 30 MIN PRN
Status: DISCONTINUED | OUTPATIENT
Start: 2022-10-30 | End: 2022-10-30 | Stop reason: HOSPADM

## 2022-10-30 RX ORDER — OXYTOCIN 10 [USP'U]/ML
10 INJECTION, SOLUTION INTRAMUSCULAR; INTRAVENOUS
Status: DISCONTINUED | OUTPATIENT
Start: 2022-10-30 | End: 2022-10-30 | Stop reason: HOSPADM

## 2022-10-30 RX ORDER — PROCHLORPERAZINE 25 MG
25 SUPPOSITORY, RECTAL RECTAL EVERY 12 HOURS PRN
Status: DISCONTINUED | OUTPATIENT
Start: 2022-10-30 | End: 2022-10-30 | Stop reason: HOSPADM

## 2022-10-30 RX ORDER — NALOXONE HYDROCHLORIDE 0.4 MG/ML
0.4 INJECTION, SOLUTION INTRAMUSCULAR; INTRAVENOUS; SUBCUTANEOUS
Status: DISCONTINUED | OUTPATIENT
Start: 2022-10-30 | End: 2022-10-30 | Stop reason: HOSPADM

## 2022-10-30 RX ORDER — MISOPROSTOL 200 UG/1
800 TABLET ORAL
Status: DISCONTINUED | OUTPATIENT
Start: 2022-10-30 | End: 2022-10-30 | Stop reason: HOSPADM

## 2022-10-30 RX ORDER — MISOPROSTOL 200 UG/1
400 TABLET ORAL
Status: DISCONTINUED | OUTPATIENT
Start: 2022-10-30 | End: 2022-10-30 | Stop reason: HOSPADM

## 2022-10-30 RX ORDER — IBUPROFEN 800 MG/1
800 TABLET, FILM COATED ORAL EVERY 6 HOURS PRN
Status: DISCONTINUED | OUTPATIENT
Start: 2022-10-30 | End: 2022-11-01 | Stop reason: HOSPADM

## 2022-10-30 RX ORDER — MORPHINE SULFATE 10 MG/ML
10 INJECTION, SOLUTION INTRAMUSCULAR; INTRAVENOUS
Status: COMPLETED | OUTPATIENT
Start: 2022-10-30 | End: 2022-10-30

## 2022-10-30 RX ORDER — MISOPROSTOL 200 UG/1
400 TABLET ORAL
Status: DISCONTINUED | OUTPATIENT
Start: 2022-10-30 | End: 2022-11-01 | Stop reason: HOSPADM

## 2022-10-30 RX ORDER — OXYTOCIN 10 [USP'U]/ML
10 INJECTION, SOLUTION INTRAMUSCULAR; INTRAVENOUS
Status: DISCONTINUED | OUTPATIENT
Start: 2022-10-30 | End: 2022-11-01 | Stop reason: CLARIF

## 2022-10-30 RX ORDER — FENTANYL CITRATE-0.9 % NACL/PF 10 MCG/ML
100 PLASTIC BAG, INJECTION (ML) INTRAVENOUS EVERY 5 MIN PRN
Status: DISCONTINUED | OUTPATIENT
Start: 2022-10-30 | End: 2022-10-30 | Stop reason: HOSPADM

## 2022-10-30 RX ORDER — ACETAMINOPHEN 325 MG/1
650 TABLET ORAL EVERY 4 HOURS PRN
Status: DISCONTINUED | OUTPATIENT
Start: 2022-10-30 | End: 2022-11-01 | Stop reason: HOSPADM

## 2022-10-30 RX ORDER — MODIFIED LANOLIN
OINTMENT (GRAM) TOPICAL
Status: DISCONTINUED | OUTPATIENT
Start: 2022-10-30 | End: 2022-11-01 | Stop reason: HOSPADM

## 2022-10-30 RX ORDER — BISACODYL 10 MG
10 SUPPOSITORY, RECTAL RECTAL DAILY PRN
Status: DISCONTINUED | OUTPATIENT
Start: 2022-10-30 | End: 2022-11-01 | Stop reason: HOSPADM

## 2022-10-30 RX ORDER — SODIUM CHLORIDE, SODIUM LACTATE, POTASSIUM CHLORIDE, CALCIUM CHLORIDE 600; 310; 30; 20 MG/100ML; MG/100ML; MG/100ML; MG/100ML
INJECTION, SOLUTION INTRAVENOUS CONTINUOUS PRN
Status: DISCONTINUED | OUTPATIENT
Start: 2022-10-30 | End: 2022-10-30 | Stop reason: HOSPADM

## 2022-10-30 RX ORDER — BUPIVACAINE HYDROCHLORIDE 2.5 MG/ML
INJECTION, SOLUTION EPIDURAL; INFILTRATION; INTRACAUDAL PRN
Status: DISCONTINUED | OUTPATIENT
Start: 2022-10-30 | End: 2022-10-30

## 2022-10-30 RX ORDER — METOCLOPRAMIDE HYDROCHLORIDE 5 MG/ML
10 INJECTION INTRAMUSCULAR; INTRAVENOUS EVERY 6 HOURS PRN
Status: DISCONTINUED | OUTPATIENT
Start: 2022-10-30 | End: 2022-10-30 | Stop reason: HOSPADM

## 2022-10-30 RX ORDER — OXYTOCIN/0.9 % SODIUM CHLORIDE 30/500 ML
340 PLASTIC BAG, INJECTION (ML) INTRAVENOUS CONTINUOUS PRN
Status: DISCONTINUED | OUTPATIENT
Start: 2022-10-30 | End: 2022-10-30 | Stop reason: HOSPADM

## 2022-10-30 RX ORDER — CITRIC ACID/SODIUM CITRATE 334-500MG
30 SOLUTION, ORAL ORAL
Status: DISCONTINUED | OUTPATIENT
Start: 2022-10-30 | End: 2022-10-30 | Stop reason: HOSPADM

## 2022-10-30 RX ORDER — OXYTOCIN/0.9 % SODIUM CHLORIDE 30/500 ML
340 PLASTIC BAG, INJECTION (ML) INTRAVENOUS CONTINUOUS PRN
Status: DISCONTINUED | OUTPATIENT
Start: 2022-10-30 | End: 2022-11-01 | Stop reason: HOSPADM

## 2022-10-30 RX ORDER — METHYLERGONOVINE MALEATE 0.2 MG/ML
200 INJECTION INTRAVENOUS
Status: DISCONTINUED | OUTPATIENT
Start: 2022-10-30 | End: 2022-10-30 | Stop reason: HOSPADM

## 2022-10-30 RX ORDER — HYDROCORTISONE 25 MG/G
CREAM TOPICAL 3 TIMES DAILY PRN
Status: DISCONTINUED | OUTPATIENT
Start: 2022-10-30 | End: 2022-11-01 | Stop reason: HOSPADM

## 2022-10-30 RX ORDER — CARBOPROST TROMETHAMINE 250 UG/ML
250 INJECTION, SOLUTION INTRAMUSCULAR
Status: DISCONTINUED | OUTPATIENT
Start: 2022-10-30 | End: 2022-11-01 | Stop reason: HOSPADM

## 2022-10-30 RX ORDER — METHYLERGONOVINE MALEATE 0.2 MG/ML
200 INJECTION INTRAVENOUS
Status: DISCONTINUED | OUTPATIENT
Start: 2022-10-30 | End: 2022-11-01 | Stop reason: HOSPADM

## 2022-10-30 RX ORDER — OXYTOCIN/0.9 % SODIUM CHLORIDE 30/500 ML
100-340 PLASTIC BAG, INJECTION (ML) INTRAVENOUS CONTINUOUS PRN
Status: DISCONTINUED | OUTPATIENT
Start: 2022-10-30 | End: 2022-11-01 | Stop reason: CLARIF

## 2022-10-30 RX ORDER — OXYTOCIN/0.9 % SODIUM CHLORIDE 30/500 ML
1-24 PLASTIC BAG, INJECTION (ML) INTRAVENOUS CONTINUOUS
Status: DISCONTINUED | OUTPATIENT
Start: 2022-10-30 | End: 2022-10-30 | Stop reason: HOSPADM

## 2022-10-30 RX ORDER — METOCLOPRAMIDE 10 MG/1
10 TABLET ORAL EVERY 6 HOURS PRN
Status: DISCONTINUED | OUTPATIENT
Start: 2022-10-30 | End: 2022-10-30 | Stop reason: HOSPADM

## 2022-10-30 RX ORDER — PROCHLORPERAZINE MALEATE 10 MG
10 TABLET ORAL EVERY 6 HOURS PRN
Status: DISCONTINUED | OUTPATIENT
Start: 2022-10-30 | End: 2022-10-30 | Stop reason: HOSPADM

## 2022-10-30 RX ORDER — NALBUPHINE HYDROCHLORIDE 10 MG/ML
2.5-5 INJECTION, SOLUTION INTRAMUSCULAR; INTRAVENOUS; SUBCUTANEOUS EVERY 6 HOURS PRN
Status: DISCONTINUED | OUTPATIENT
Start: 2022-10-30 | End: 2022-11-01 | Stop reason: HOSPADM

## 2022-10-30 RX ORDER — DOCUSATE SODIUM 100 MG/1
100 CAPSULE, LIQUID FILLED ORAL DAILY
Status: DISCONTINUED | OUTPATIENT
Start: 2022-10-30 | End: 2022-11-01 | Stop reason: HOSPADM

## 2022-10-30 RX ORDER — OXYTOCIN 10 [USP'U]/ML
10 INJECTION, SOLUTION INTRAMUSCULAR; INTRAVENOUS
Status: DISCONTINUED | OUTPATIENT
Start: 2022-10-30 | End: 2022-11-01 | Stop reason: HOSPADM

## 2022-10-30 RX ORDER — CARBOPROST TROMETHAMINE 250 UG/ML
250 INJECTION, SOLUTION INTRAMUSCULAR
Status: DISCONTINUED | OUTPATIENT
Start: 2022-10-30 | End: 2022-10-30 | Stop reason: HOSPADM

## 2022-10-30 RX ORDER — IBUPROFEN 800 MG/1
800 TABLET, FILM COATED ORAL
Status: DISCONTINUED | OUTPATIENT
Start: 2022-10-30 | End: 2022-11-01 | Stop reason: CLARIF

## 2022-10-30 RX ADMIN — Medication: at 02:17

## 2022-10-30 RX ADMIN — BUPIVACAINE HYDROCHLORIDE 5 ML: 2.5 INJECTION, SOLUTION EPIDURAL; INFILTRATION; INTRACAUDAL at 01:56

## 2022-10-30 RX ADMIN — IBUPROFEN 800 MG: 800 TABLET, FILM COATED ORAL at 20:08

## 2022-10-30 RX ADMIN — IBUPROFEN 800 MG: 800 TABLET, FILM COATED ORAL at 08:04

## 2022-10-30 RX ADMIN — BUPIVACAINE HYDROCHLORIDE 5 ML: 2.5 INJECTION, SOLUTION EPIDURAL; INFILTRATION; INTRACAUDAL at 01:59

## 2022-10-30 RX ADMIN — METHYLERGONOVINE MALEATE 200 MCG: 0.2 INJECTION, SOLUTION INTRAMUSCULAR; INTRAVENOUS at 06:44

## 2022-10-30 RX ADMIN — MORPHINE SULFATE 10 MG: 10 INJECTION INTRAVENOUS at 00:18

## 2022-10-30 RX ADMIN — LIDOCAINE HYDROCHLORIDE,EPINEPHRINE BITARTRATE 3 ML: 15; .005 INJECTION, SOLUTION EPIDURAL; INFILTRATION; INTRACAUDAL; PERINEURAL at 01:54

## 2022-10-30 RX ADMIN — HYDROXYZINE HYDROCHLORIDE 100 MG: 50 TABLET, FILM COATED ORAL at 00:17

## 2022-10-30 RX ADMIN — SODIUM CHLORIDE, POTASSIUM CHLORIDE, SODIUM LACTATE AND CALCIUM CHLORIDE 1000 ML: 600; 310; 30; 20 INJECTION, SOLUTION INTRAVENOUS at 00:35

## 2022-10-30 RX ADMIN — Medication 2 MILLI-UNITS/MIN: at 06:13

## 2022-10-30 RX ADMIN — IBUPROFEN 800 MG: 800 TABLET, FILM COATED ORAL at 14:22

## 2022-10-30 ASSESSMENT — ACTIVITIES OF DAILY LIVING (ADL)
ADLS_ACUITY_SCORE: 20
ADLS_ACUITY_SCORE: 24
ADLS_ACUITY_SCORE: 24
ADLS_ACUITY_SCORE: 20
ADLS_ACUITY_SCORE: 24
ADLS_ACUITY_SCORE: 20
ADLS_ACUITY_SCORE: 20
ADLS_ACUITY_SCORE: 24

## 2022-10-30 NOTE — LACTATION NOTE
This note was copied from a baby's chart.  Lactation visit. Infant latched on R breast at time of visit, non nutritive suck bursts noticed, wide mouth angle and flanged lips seen. Zuleima stating he had been feeding for about 20 min. Zuleima reports she  her first, used a nipple shield initially but was able to wean off - has not needed it with this baby. Expected feeding behaviors in first 24 hours reviewed - encouraged STS and waking for feeding every 2-3 hours. Questions answered regarding pump options from hospital and when to initiate pumping/offering bottles. She is aware lactation remains available for support as afternoon progresses. Bedside RN updated on visit.

## 2022-10-30 NOTE — ANESTHESIA PREPROCEDURE EVALUATION
Anesthesia Pre-Procedure Evaluation    Patient: Agustin Ortega   MRN: [id number] : 1990        Procedure : * No procedures listed *          Past Medical History:   Diagnosis Date     No pertinent past medical history 2022      Past Surgical History:   Procedure Laterality Date     wisdom teeth  2011      No Known Allergies   Social History     Tobacco Use     Smoking status: Never     Smokeless tobacco: Never   Substance Use Topics     Alcohol use: No      Wt Readings from Last 1 Encounters:   10/28/22 72.6 kg (160 lb)        Anesthesia Evaluation            ROS/MED HX  ENT/Pulmonary:  - neg pulmonary ROS     Neurologic:  - neg neurologic ROS     Cardiovascular:  - neg cardiovascular ROS     METS/Exercise Tolerance:     Hematologic:  - neg hematologic  ROS     Musculoskeletal:  - neg musculoskeletal ROS     GI/Hepatic:     (+) GERD,     Renal/Genitourinary:  - neg Renal ROS     Endo:  - neg endo ROS     Psychiatric/Substance Use:  - neg psychiatric ROS     Infectious Disease:  - neg infectious disease ROS     Malignancy:  - neg malignancy ROS     Other:  - neg other ROS    (+) Possibly pregnant, ,         Physical Exam    Airway        Mallampati: II   TM distance: > 3 FB   Neck ROM: full   Mouth opening: > 3 cm    Respiratory Devices and Support         Dental           Cardiovascular   cardiovascular exam normal          Pulmonary   pulmonary exam normal            Other findings: Lab Test        10/28/22     09/30/22     07/29/22                       1011          1127          1157          WBC          5.1          4.5          4.7           HGB          10.8*        10.4*        10.6*         MCV          84           84           85            PLT          128*         121*         151            Lab Test        22                       0933          2049          1253          NA           141          135          137           POTASSIUM    3.6           3.4          3.6           CHLORIDE     110*         106          107           CO2          23           20           22            BUN          4*           5*           5*            CR           0.32*        0.31*        0.26*         ANIONGAP     8            9            8             SARAH          8.9          8.8          8.8           GLC          90           82           82              OUTSIDE LABS:  CBC:   Lab Results   Component Value Date    WBC 5.1 10/28/2022    WBC 4.5 09/30/2022    HGB 10.8 (L) 10/28/2022    HGB 10.4 (L) 09/30/2022    HCT 31.7 (L) 10/28/2022    HCT 30.7 (L) 09/30/2022     (L) 10/28/2022     (L) 09/30/2022     BMP:   Lab Results   Component Value Date     07/01/2022     05/11/2022    POTASSIUM 3.6 07/01/2022    POTASSIUM 3.4 05/11/2022    CHLORIDE 110 (H) 07/01/2022    CHLORIDE 106 05/11/2022    CO2 23 07/01/2022    CO2 20 05/11/2022    BUN 4 (L) 07/01/2022    BUN 5 (L) 05/11/2022    CR 0.32 (L) 07/01/2022    CR 0.31 (L) 05/11/2022    GLC 90 07/01/2022    GLC 82 05/11/2022     COAGS: No results found for: PTT, INR, FIBR  POC:   Lab Results   Component Value Date    HCG Negative 04/01/2015    HCGS Negative 10/20/2018     HEPATIC:   Lab Results   Component Value Date    ALBUMIN 3.0 (L) 07/01/2022    PROTTOTAL 6.3 (L) 07/01/2022    ALT 8 07/01/2022    AST 9 07/01/2022    ALKPHOS 47 07/01/2022    BILITOTAL 0.3 07/01/2022     OTHER:   Lab Results   Component Value Date    A1C 5.5 03/16/2022    SARAH 8.9 07/01/2022    MAG 1.7 05/08/2022    LIPASE 15 06/29/2022    AMYLASE 23 (L) 06/29/2022    TSH 1.13 02/08/2021    CRP 23.0 (H) 01/02/2018    SED 19 01/02/2018       Anesthesia Plan    ASA Status:  2      Anesthesia Type: Epidural.              Consents    Anesthesia Plan(s) and associated risks, benefits, and realistic alternatives discussed. Questions answered and patient/representative(s) expressed understanding.    - Discussed:     - Discussed with:  Patient      -  Extended Intubation/Ventilatory Support Discussed: No.      - Patient is DNR/DNI Status: No    Use of blood products discussed: No .     Postoperative Care            Comments:                Nic Valdovinos MD

## 2022-10-30 NOTE — L&D DELIVERY NOTE
OB Vaginal Delivery Note    Agustin Ortega MRN# [id number]   Age: 31 year old YOB: 1990       GA: 40w1d  GP:   Labor Complications: None   EBL:   mL  Delivery QBL:    Delivery Type: Vaginal, Spontaneous   ROM to Delivery Time: (Delivered) Hours: 4 Minutes: 7  Elma Weight: 3.18 kg (7 lb 0.2 oz)    1 Minute 5 Minute 10 Minute   Apgar Totals: 7   9        HUMAIRA PALACIOS;CALVIN COLEMAN   Labor course:  Zuleima was admitted to [facility] for spontaneous onset of labor. She requested an epidural for pain management. She had maternal tachy of 110 after the epidural placement, and was shaking. Shaking improved and her pulse was within the normal range.   Delivery Details:  Agustin Ortega, a 31 year old  female delivered a viable infant with apgars of 7  and 9 . Patient was fully dilated and pushing after   hours   minutes in active labor. Delivery was via vaginal, spontaneous  to a sterile field under epidural  anesthesia. Infant delivered in vertex  left  occiput  anterior  position. Anterior and posterior shoulders delivered without difficulty. The cord was clamped, cut twice and 3 vessels  were noted. Cord blood was obtained in routine fashion with the following disposition: lab .      Cord complications: none   Placenta delivered at 10/30/2022  6:42 AM . Placental disposition was Hospital disposal . Fundal massage performed and fundus found to be firm.     Episiotomy: none    Perineum, vagina, cervix were inspected, and the following lacerations were noted:   Perineal lacerations: 2nd                Any lacerations were repaired in the usual fashion using.    Excellent hemostasis was noted. Needle count correct. Infant and patient in delivery room in good and stable condition.        Kaushik Ortega-Agustin [[id number]]    Labor Event Times    Labor onset date: 10/30/22 Onset time:  1:00 AM   Dilation complete date: 10/30/22 Complete time:  5:46 AM   Start pushing  date/time: 10/30/2022 0601      Labor Events     labor?: No   steroids: None  Labor Type: Spontaneous, Augmentation  Predominate monitoring during 1st stage: continuous electronic fetal monitoring     Antibiotics received during labor?: No     Rupture date/time: 10/30/22 0230   Rupture type: Spontaneous rupture of membranes occuring during spontaneous labor or augmentation  Fluid color: Clear  Fluid odor: Normal     Augmentation: Oxytocin  1:1 continuous labor support provided by?: RN Labor partogram used?: no      Delivery/Placenta Date and Time    Delivery Date: 10/30/22 Delivery Time:  6:37 AM   Placenta Date/Time: 10/30/2022  6:42 AM  Oxytocin given at the time of delivery: after delivery of baby  Delivering clinician: Jessica Carrion APRN CNM   Other personnel present at delivery:  Provider Role   Sabina Ramsey RN Registered Nurse   Zuly Carrasco RN Registered Nurse         Vaginal Counts     Initial count performed by 2 team members:  Two Team Members   pushpa Cardoso CNM       Needles Suture Needles Sponges (RETIRED) Instruments   Initial counts 2 0 5    Added to count 0 1 5    Relief counts       Final counts 2 1 10          Placed during labor Accounted for at the end of labor   FSE No NA   IUPC No NA   Cervidil No NA              Final count performed by 2 team members:  Two Team Members   PUSHPA Hutchinson CNM       Final count correct?: Yes     Apgars    Living status: Living   1 Minute 5 Minute 10 Minute 15 Minute 20 Minute   Skin color: 1  1       Heart rate: 2  2       Reflex irritability: 1  2       Muscle tone: 2  2       Respiratory effort: 1  2       Total: 7  9       Apgars assigned by: TRICIA CAVAZOS RN     Cord    Vessels: 3 Vessels    Cord Complications: None               Cord Blood Disposition: Lab    Gases Sent?: No    Delayed cord clamping?: Yes    Cord Clamping Delay (seconds):  seconds    Stem cell collection?: No       [place] Resuscitation   "  Methods: Oximetry, Temp Skin Probe, NCPAP   Care at Delivery: Nasal flaring and grunting immediately after birth. Brought to warmer for stimulation. SPO2 applied with difficulty reading. Due to persistent nasal flaring, grunting, and course lungs, bulb suctioning performed, and ~ 3 min of cpap was applied. Signs of difficulty breathing resolved. SPO2 within normal limits. Mother brought back to mother for skin to skin. Sabina YORK RN      Wilber Measurements    Weight: 7 lb 0.2 oz Length: 1' 9\"   Head circumference: 32.4 cm       Skin to Skin and Feeding Plan    Skin to skin initiation date/time: 1/10/1841    Skin to skin with: Mother  Skin to skin end date/time:     Breastfeeding initiated date/time: 10/30/2022 0710     Labor Events and Shoulder Dystocia    Fetal Tracing Prior to Delivery: Category 2  Fetal Tracing Comments: cat II during second stage  Shoulder dystocia present?: Neg     Delivery (Maternal) (Provider to Complete) ([id number])    Episiotomy: None  Perineal lacerations: 2nd Repaired?: Yes   Repair suture: 3-0 Vicryl  Genital tract inspection done: Pos     Blood Loss  Mother: Zuleima Ortega #[id number]   Start of Mother's Information    Delivery Blood Loss  10/30/22 0100 - 10/30/22 0728    None           End of Mother's Information  Mother: Zuleima Ortega #[id number]          Delivery - Provider to Complete ([id number])    Delivering clinician: Jessica Carrion APRN CNM  CNM Care: Exclusive CNM care in labor  Attempted Delivery Types (Choose all that apply): Spontaneous Vaginal Delivery  Delivery Type (Choose the 1 that will go to the Birth History): Vaginal, Spontaneous                   Other personnel:  Provider Role   Sabina Ramsey RN Registered Nurse   Zuly Carrasco RN Registered Nurse                Placenta    Date/Time: 10/30/2022  6:42 AM  Removal: Spontaneous  Disposition: Hospital disposal           Anesthesia    Method: Epidural     Analgesic:  BIRTH HISTORY: ANALGESIC   FENTANYL 2 " MCG/ML BUPIVACAINE 0.125% IN NS OB EPIDURAL ORDERABLE             Presentation and Position    Presentation: Vertex    Position: Left Occiput Anterior                 WAN HERCULESM

## 2022-10-30 NOTE — PLAN OF CARE
Data: [facility] at 0925. Baby transferred via parent's arms.  Action: Receiving unit notified of transfer: Yes. Patient and family notified of room change. Report given to PUSHPA Omer at 0930. Belongings sent to receiving unit. Accompanied by Registered Nurse. Oriented patient to surroundings. Call light within reach. ID bands double-checked with receiving RN.  Response: Patient tolerated transfer and is stable.    Patients mobililty level scored using the bedside mobility assistance tool (BMAT). Patient is at a mobility level test number: 3. Mobility equipment used: none required. Required assist of 1 staff members. Further use of BMAT scoring required.

## 2022-10-30 NOTE — PROVIDER NOTIFICATION
10/30/22 0100   Provider Notification   Provider Name/Title Jessica, COLE   Method of Notification Phone   Request Evaluate - Remote   Notification Reason Status Update   Updated CNM of pt received morphine and vistaril. Approx 300cc of bolus infused and pt still very uncomfortable. SVE changed to 3/85/-2. Requested intrapartum orders. Pt desires epidural. MD will put in order and pt can have epidural now.

## 2022-10-30 NOTE — ANESTHESIA PROCEDURE NOTES
"Epidural catheter Procedure Note    Pre-Procedure   Staff -        Anesthesiologist:  Nic Valdovinos MD       Performed By: anesthesiologist       Referred By: Murali       Location: OB       Pre-Anesthestic Checklist: patient identified, IV checked, risks and benefits discussed, informed consent, monitors and equipment checked, pre-op evaluation, at physician/surgeon's request and post-op pain management  Timeout:       Correct Patient: Yes        Correct Procedure: Yes        Correct Site: Yes        Correct Position: Yes   Procedure Documentation  Procedure: epidural catheter   Comments:  Patient desires Labor Epidural for labor analgesia. Vaginal delivery anticipated.    Chart reviewed. Patient examined. No changes to pre procedure chart review. Risks including but not limited to bleeding, infection, nerve injury, PDPH, intrathecal injection, high block, incomplete block, one-sided block, back pain, and low blood pressure discussed in detail. Questions answered. Consent signed.    Pause for the Cause completed. NIBP and pulse ox functioning. L&D nurse present.    Procedure: Sitting. Betadine prep x 3. Sterile drape applied.  Lidocaine 1% x 2 cc local infiltration at L 3-4.  17 G. Tu needle ML KAYLEE 1 attempt.  No CSF, paresthesia or blood. 20 g. Epidural catheter inserted w/o resistance 5 cm.  Negative aspiration for CSF and blood. Filter in line.  Test dose Lidocaine 1.5% w/ 1:200,000 epi x 3 cc injected. Negative for neuro change or symptoms of intravascular injection.  Bolus dose: Marcaine 0.25% 5cc x 2 doses (10 cc total).  Infusion orders written.    I or my partner am immediately available. I or my partner will monitor the patient and supervise nursing care at necessary intervals.    JAKHai       FOR [facility]) ONLY:   Pain Team Contact information: please page the Pain Team Via [employer]. Search \"Pain\". During daytime hours, please page the attending first. At night please page the "  first.

## 2022-10-30 NOTE — PLAN OF CARE
VSS and pt meeting expected goals for the shift. Denies pain meds. Working on breastfeeding with some assist. FOB attentive at bedside. Family bonding well.

## 2022-10-30 NOTE — PROVIDER NOTIFICATION
10/29/22 2239   Provider Notification   Provider Name/Title COLE Lopez   Method of Notification Phone   Request Evaluate - Remote   Notification Reason Status Update   Updated MD of pt arrival. SVE 1.5/75/-2 posterior. Cx 2-4 minutes apart palpating mild. Fetal tracing with moderate variability but no accelerations or decelerations. Pt reports that she has had a meal today but states she doesn't remember what she had or when. And reports she has had some fluids today but not much. Orders received for IV bolus, UA, morphine and vistaril. Recheck cervix in 1-2 hours. CNM in house if needed.

## 2022-10-30 NOTE — PLAN OF CARE
Data: Vital signs within normal limits. Postpartum checks within normal limits - see flow record. Patient eating and drinking normally. Patient able to empty bladder independently and is up ambulating. No apparent signs of infection.  noted  healing well. Patient performing self cares and is able to care for infant.Pt took shower this afternoon.  Pt request to help with breast feeding.  Action: Patient medicated during the shift for pain and cramping. See MAR. Patient reassessed within 1 hour after each medication and pain was improved - patient stated she was comfortable. Patient education done about PO hydration. See flow record.  Response: Positive attachment behaviors observed with infant. Support persons is  present and participates in cares.  Plan: Continue watch and monitor.Anticipate discharge on 10/31/22.

## 2022-10-30 NOTE — PROVIDER NOTIFICATION
10/30/22 0537   Provider Notification   Provider Name/Title Jessica, COLE   Method of Notification Phone   Request Evaluate - Remote   Notification Reason Status Update   Updated that catheter was not draining adequate amounts of urine, catheter was deflated and readjusted, not voiding adequately, fetal tracing now with intermittent VD and ED. Orders to perform SVE and CNM will come to bedside.

## 2022-10-30 NOTE — TELEPHONE ENCOUNTER
OB Triage Call      Is patient's OB/Midwife with the [facility] or [facility]? [facility]- Proceed with triage     Reason for call: contractions    Assessment: pt reports contractions every 4-6 min for past 1 hour. Contractions are strong.  2nd baby. 40 wk 0 d. No vaginal bleeding or leakage. Baby moving well.     Plan: Page CNM now     Patient plans to deliver at [facility]     Patient's primary OB Provider is [facility].      Per protocol recommendations Consult needed for [facility] MD or Midwife.  Patient's primary OB is [facility]. Paged on-call midwife for patient's primary OB clinic (refer to where patient is seen as midwives may go to multiple locations) Jessica at 10:28pm to call [facility] back at 10:28pm.  Call returned at 10:29pm and advised on triage assessment. Does midwife recommend L&D evaluation? Yes-  Labor and delivery at [facility] ([phone]) notified of patient's pending arrival. Patient notified to go to L&D by RN  called Roxane at [facility] L&D at 10:34pm       Is patient's delivering hospital on divert? No      40w0d    Estimated Date of Delivery: Oct 29, 2022        OB History    Para Term  AB Living   2 1 1 0 0 1   SAB IAB Ectopic Multiple Live Births   0 0 0 0 1      # Outcome Date GA Lbr Tip/2nd Weight Sex Delivery Anes PTL Lv   2 Current            1 Term 19 40w4d 04:00 / 02:27 2.835 kg (6 lb 4 oz) M Vag-Spont EPI, Local N NEHEMIAS      Name: ROVERTO,MALE-TAYEBRIFEDE      Apgar1: 8  Apgar5: 9       Lab Results   Component Value Date    GBS Negative 2019          Solange Gomes, RN 10/29/22 10:21 PM  [facility] Advisor    Reason for Disposition    [1] History of prior delivery (multipara) AND [2] contractions < 10 minutes apart AND [3] present 1 hour    Additional Information    Negative: Passed out (i.e., lost consciousness, collapsed and was not responding)    Negative: Shock suspected (e.g., cold/pale/clammy skin, too weak to stand, low BP, rapid pulse)    Negative:  "Difficult to awaken or acting confused (e.g., disoriented, slurred speech)    Negative: [1] SEVERE abdominal pain (e.g., excruciating) AND [2] constant AND [3] present > 1 hour    Negative: Severe bleeding (e.g., continuous red blood from vagina, or large blood clots)    Negative: Umbilical cord hanging out of the vagina (shiny, white, curled appearance, \"like telephone cord\")    Negative: Uncontrollable urge to push (i.e., feels like baby is coming out now)    Negative: Can see baby    Negative: Sounds like a life-threatening emergency to the triager    Negative: Pregnant < 37 weeks (i.e., )    Negative: [1] Uncertain delivery date AND [2] possibly pregnant < 37 weeks (i.e., )    Protocols used: PREGNANCY - LABOR-A-AH      "

## 2022-10-30 NOTE — PLAN OF CARE
Data: Patient presented to Birthplace: 10/29/2022 11:07 PM.  Reason for maternal/fetal assessment is uterine contractions. Patient reports cx became more intense around 2100.  Patient is a .  Prenatal record reviewed. Pregnancy  has been complicated by n/v requiring iv infusions, covid.  Gestational Age 40w0d. VSS. Fetal movement active. Patient denies leaking of vaginal fluid/rupture of membranes, vomiting, headache, visual disturbances, epigastric or URQ pain, significant edema. Support person is present.   Action: Verbal consent for EFM. Triage assessment completed. Bill of rights reviewed.  Response: Patient verbalized agreement with plan. Will contact FERNANDO Jessica Carrion with update and for further orders.

## 2022-10-30 NOTE — PROGRESS NOTES
CNM PROGRESS NOTE    SUBJECTIVE:  Zuleima is comfortable with an epidural, shaking. Had few elevated Bps that were taken when patient was shivering and shaking. Repeat BP was normal.  Melake at bedside, supportive.      OBJECTIVE:  Temp 98.5  F (36.9  C) (Oral)   Resp 20   LMP 2021 (Within Weeks)     Fetal heart tones: Baseline 125   Variability: moderate  Accelerations: present  Decelerations: absent    Contractions: Pt is tia every 2-6 minutes, lasting 50-70 seconds and palpates moderate    Cervix: 6.5/ 90% / -1, Vtx  ROM: not ruptured    Pitocin- none  Antibiotics- none  Cervical ripening: N/A    ASSESSMENT:  IUP @ 40w1d active labor   GBS- negative  Bloody show  Maternal tachy 110 bpm after an epidural was place and due to shaking, now pulse 91 bpm    PLAN:   AROM   Anticipate   reevaluate in 2-4 hours/PRN    WAN HERCULES CNM

## 2022-10-30 NOTE — H&P
[facility] Labor Admission History & Physical    Agustin Ortega is a 31 year old  with an IUP at 40w1d  -American; ,   Partner/support Person: Timi  Language Barrier: English  Clinic: [facility]  Provider: Carolee    Agustin Ortega is admitted to the Birthplace at [facility] on 10/30/2022 at 1:13 AM       History of present inllness/Chief Complaint:    Here with: spontaneous onset of labor  Patient reports contractions are Regular           Baby active Yes  Membranes are intact.  Bloody show No   Any changes with medical history since last prenatal visit No  Declines syphilis screening.Had negative result in this pregnancy     Obstetrical history  Estimated Date of Delivery: Oct 29, 2022 determined by ultrasound  Patient's last menstrual period was 2021 (within weeks).   Dating U/S: 2022    Fetal anatomic survey: Normal  Placenta: posterior     PRENATAL COURSE  Prenatal care began at 7 wks gestation for a total of 16 prenatal visits.  Total wt gain -17 lbs; There is no height or weight on file to calculate BMI.  Prenatal Blood Pressure: WNL  Prenatal course was   complicated by    Patient Active Problem List    Diagnosis Date Noted     Normal labor 10/30/2022     Priority: Medium     Decreased fetal movements in third trimester 10/04/2022     Priority: Medium     COVID-19 affecting pregnancy in second trimester 05/10/2022     Priority: Medium     Nausea and vomiting in pregnancy prior to 22 weeks gestation 2022     Priority: Medium     Low vitamin D level 2022     Priority: Medium     Normal labor and delivery 2019     Priority: Medium     Encounter for triage in pregnant patient 2019     Priority: Medium     Indication for care in labor or delivery 2019     Priority: Medium     GERD (gastroesophageal reflux disease) 2013     Priority: Medium     CARDIOVASCULAR SCREENING; LDL GOAL LESS THAN 160 2013     Priority: Medium     Tdap:  2022  Rhogam: n/a    Patient Active Problem List   Diagnosis     GERD (gastroesophageal reflux disease)     CARDIOVASCULAR SCREENING; LDL GOAL LESS THAN 160     Indication for care in labor or delivery     Encounter for triage in pregnant patient     Normal labor and delivery     Low vitamin D level     Nausea and vomiting in pregnancy prior to 22 weeks gestation     COVID-19 affecting pregnancy in second trimester     Decreased fetal movements in third trimester     Normal labor       HISTORY  No Known Allergies  Past Medical History:   Diagnosis Date     No pertinent past medical history 2022     Past Surgical History:   Procedure Laterality Date     wisdom teeth       Family History   Problem Relation Age of Onset     Diabetes Father      Hypertension Father      Hyperlipidemia Father      Cerebrovascular Disease Father 60     Cancer Maternal Grandmother      Cancer Maternal Grandfather      Diabetes Brother 31        Type 2     Social History     Tobacco Use     Smoking status: Never     Smokeless tobacco: Never   Substance Use Topics     Alcohol use: No     OB History    Para Term  AB Living   2 1 1 0 0 1   SAB IAB Ectopic Multiple Live Births   0 0 0 0 1      # Outcome Date GA Lbr Tip/2nd Weight Sex Delivery Anes PTL Lv   2 Current            1 Term 19 40w4d 04:00 / 02:27 2.835 kg (6 lb 4 oz) M Vag-Spont EPI, Local N NEHEMIAS      Name: MACEY LAYNE      Apgar1: 8  Apgar5: 9       LABS:  Lab Results   Component Value Date    ABO O 2019    RH Pos 2019    AS Neg 2019    HGB 10.8 (L) 10/28/2022    HEPBANG Nonreactive 2022    CHPCRT Negative 2022    GCPCRT Negative 2022       GBS Status:   Lab Results   Component Value Date    GBS Negative 2019     Rubella: Immune    HIV: Non-Reactive   Platelets:  128  1hr GCT:  119    ROS   Pt is alert and oriented  Pt denies significant constitutional symptoms including fever and/or malaise.    Pt  denies significant respiratory, cardiovacular, GI, or muscular/skeletal complaints.    Neuro: Denies HA and visual changes  Muscoloskeletal: Denies except for discomforts r/t pregnancy     PHYSICAL EXAM:  Temp 98.5  F (36.9  C) (Oral)   Resp 20   LMP 2021 (Within Weeks)   General appearance:  healthy, alert and active   Heart: RRR  Lungs: CTA bilaterally, normal respiratory effort  Abdomen: gravid, single vertex fetus, non-tender, EFW 7 lbs.   Legs: reflexes 2+ bilaterally, no clonus, no edema     Contractions: Pt is tia every 2-6 minutes, lasting 50-70 seconds and palpates mild    Fetal heart tones: Baseline 130   Variability: moderate   Accelerations: present  Decelerations: absent    NST: reactive    Cervix: 3/ 70% / Posterior/ average/ -2, Vtx  Bloody show: no  Membranes:  intact    ASSESSMENT:  31 year old  with lacy IUP 40w1d in early labor  NST reactive  GBS negative and membranes intact  Morphine IM 10 mg and Vistaril 100 mg given, patient is uncomfortable, requesting an epidural    PLAN:  Routine CNM care  Labs ordered: Hemoglobin and type and screen  Teaching done r/t comfort measures, pain management options, and labor processes, and   Admit - see IP orders  Pain medication - epidural  Anticipate     WAN HERCULES CNM

## 2022-10-31 LAB
BACTERIA UR CULT: NORMAL
HGB BLD-MCNC: 8.4 G/DL (ref 11.7–15.7)

## 2022-10-31 PROCEDURE — 120N000001 HC R&B MED SURG/OB

## 2022-10-31 PROCEDURE — 250N000013 HC RX MED GY IP 250 OP 250 PS 637

## 2022-10-31 PROCEDURE — 85018 HEMOGLOBIN: CPT

## 2022-10-31 PROCEDURE — 36415 COLL VENOUS BLD VENIPUNCTURE: CPT

## 2022-10-31 RX ORDER — NALOXONE HYDROCHLORIDE 0.4 MG/ML
0.4 INJECTION, SOLUTION INTRAMUSCULAR; INTRAVENOUS; SUBCUTANEOUS
Status: DISCONTINUED | OUTPATIENT
Start: 2022-10-31 | End: 2022-11-01 | Stop reason: HOSPADM

## 2022-10-31 RX ORDER — NALOXONE HYDROCHLORIDE 0.4 MG/ML
0.2 INJECTION, SOLUTION INTRAMUSCULAR; INTRAVENOUS; SUBCUTANEOUS
Status: DISCONTINUED | OUTPATIENT
Start: 2022-10-31 | End: 2022-11-01 | Stop reason: HOSPADM

## 2022-10-31 RX ADMIN — IBUPROFEN 800 MG: 800 TABLET, FILM COATED ORAL at 23:07

## 2022-10-31 RX ADMIN — ACETAMINOPHEN 650 MG: 325 TABLET, FILM COATED ORAL at 00:08

## 2022-10-31 RX ADMIN — IBUPROFEN 800 MG: 800 TABLET, FILM COATED ORAL at 11:05

## 2022-10-31 RX ADMIN — IBUPROFEN 800 MG: 800 TABLET, FILM COATED ORAL at 04:27

## 2022-10-31 RX ADMIN — IBUPROFEN 800 MG: 800 TABLET, FILM COATED ORAL at 17:14

## 2022-10-31 RX ADMIN — DOCUSATE SODIUM 100 MG: 100 CAPSULE, LIQUID FILLED ORAL at 08:06

## 2022-10-31 ASSESSMENT — ACTIVITIES OF DAILY LIVING (ADL)
ADLS_ACUITY_SCORE: 20

## 2022-10-31 NOTE — PLAN OF CARE
VSS on room air. Fundus/lochia WDL. Voiding appropriately and ambulating independently. Pain adequately controlled with PRN medications. Breastfeeding infant independently q2-3hr. Patient requested supplementing once this afternoon with formula.  at bedside, supportive. Positive bonding and attachment with infant observed.     Jade House RN on 10/31/2022 at 6:34 PM

## 2022-10-31 NOTE — PLAN OF CARE
Data: Vital signs within normal limits. Postpartum checks within normal limits - see flow record. Patient eating and drinking normally. Patient able to empty bladder independently and is up ambulating. No apparent signs of infection. Patient performing self cares, is able to care for infant and is breast feeding every 2-3 hours.   Laceration  appears within normal. Is using ibuprofen and tylenol for discomfort. Had some weakness in right leg, patient stated it felt much better after she had been up walking in her room. Rested in bed this shift.  Action: Patient medicated during the shift for cramping. See MAR. Patient reassessed within 1 hour after each medication and pain was improved - patient stated she was comfortable. Patient education done, see flow record.  Response: Positive attachment behaviors observed with infant. Patient's spouse present this shift and attentive to patient's needs.   Plan: Continue current plan of care.  Anticipate discharge on 11/1, possibly interested today if baby pees and tolerates circ well.

## 2022-10-31 NOTE — PROGRESS NOTES
Charito Rodriguez CNM Progress Note: Postpartum Day #1    2022  10:12 AM    SUBJECTIVE:  Patient is stable  and is tolerating acitivity well  Baby is rooming in  Complications since 2 hours post delivery: None  Pain is well controlled.  Patient is taking pain medications.  Breastfeeding status:initiated   Elimination:  She is voiding without difficulty.  She has not had a bowel movement  Desired contraception Condoms  Denies heavy bleeding and passing large clots.  Feels great about birth experience.    OBJECTIVE:  /62 (BP Location: Left arm, Patient Position: Sitting)   Pulse 81   Temp 97.5  F (36.4  C) (Oral)   Resp 16   LMP 2021 (Within Weeks)   SpO2 95%     Constitutional: healthy, alert and no distress    Breasts: Currently breastfeeding    Fundus: Uterine fundus is firm, non-tender and below the level of the umbilicus    Perineum: Perineum is intact and/or well approximated, minimal swelling    Lochia: Lochia is appropriate for the duration of time since delivery.     ASSESSMENT:  PPD #1    Doing well.  No excessive bleeding  Pain well-controlled.  Hemoglobin   Date Value Ref Range Status   10/31/2022 8.4 (L) 11.7 - 15.7 g/dL Final   2021 12.8 11.7 - 15.7 g/dL Final   ]      PLAN:  Continue routine care  Reviewed breastfeeding  Reviewed postpartum warning signs  Reviewed postpartum blues and postpartum depression warning signs  Plans condoms for contraception postpartum  Anticipated discharge tomorrow        SONJA RODRIGUEZ CNM

## 2022-10-31 NOTE — ANESTHESIA POSTPROCEDURE EVALUATION
Patient: Agustin Ortega    Procedure: * No procedures listed *       Anesthesia Type:  Epidural    Note:     Postop Pain Control:    PONV:    Neuro/Psych:    Airway/Respiratory:    CV/Hemodynamics:    Other NRE:    DID A NON-ROUTINE EVENT OCCUR?     Event details/Postop Comments:      S/P epidural for labor.   I or my partner was immediately available for management of this patient during epidural analgesia infusion.  VSS.  Doing well. Block resolved.  Neuro at baseline. Denies positional headache. Minimal side effects easily managed w/ PRN meds. No apparent anesthetic complications. No follow-up required.    Juan J Nazario MD           Last vitals:  Vitals:    10/31/22 0026 10/31/22 0807 10/31/22 1509   BP: 116/59 111/62 125/69   Pulse: 77 81 99   Resp: 18 16 16   Temp: 97.5  F (36.4  C) 97.5  F (36.4  C) 98.4  F (36.9  C)   SpO2:          Electronically Signed By: Juan J Nazario MD  October 31, 2022  3:50 PM

## 2022-11-01 VITALS
HEART RATE: 102 BPM | DIASTOLIC BLOOD PRESSURE: 78 MMHG | RESPIRATION RATE: 18 BRPM | TEMPERATURE: 97.8 F | OXYGEN SATURATION: 95 % | SYSTOLIC BLOOD PRESSURE: 127 MMHG

## 2022-11-01 PROCEDURE — 999N000080 HC STATISTIC IP LACTATION SERVICES 16-30 MIN

## 2022-11-01 PROCEDURE — 250N000013 HC RX MED GY IP 250 OP 250 PS 637

## 2022-11-01 RX ADMIN — IBUPROFEN 800 MG: 800 TABLET, FILM COATED ORAL at 05:02

## 2022-11-01 RX ADMIN — DOCUSATE SODIUM 100 MG: 100 CAPSULE, LIQUID FILLED ORAL at 09:07

## 2022-11-01 ASSESSMENT — ACTIVITIES OF DAILY LIVING (ADL)
ADLS_ACUITY_SCORE: 20

## 2022-11-01 NOTE — DISCHARGE SUMMARY
[facility]    Discharge Summary  Obstetrics    Date of Admission:  10/29/2022  Date of Discharge:  2022  Discharging Provider: WAN Garcia CNM  Date of Service (when I saw the patient): 22    Discharge Diagnoses     Postpartum anemia     History of Present Illness   Agustin Ortega is a 31 year old female who presented with spontaneous onset of labor. Utilized epidural anesthesia. . QBL 617ml. 2nd degree laceration. Normal postpartum course.     Hospital Course   The patient's hospital course was unremarkable.  She recovered as anticipated and experienced no post-delivery complications.  On discharge, her pain was well controlled. Vaginal bleeding is similar to peak menstrual flow.  Voiding without difficulty.  Ambulating well and tolerating a normal diet.  No fevers.  Breastfeeding well.  Infant is stable.  She was discharged on post-partum day #2.    Post-partum hemoglobin:   Hemoglobin   Date Value Ref Range Status   10/31/2022 8.4 (L) 11.7 - 15.7 g/dL Final   2021 12.8 11.7 - 15.7 g/dL Final       CN Postpartum Discharge Note    SIGNIFICANT PROBLEMS:  Patient Active Problem List    Diagnosis Date Noted     Normal labor 10/30/2022     Priority: Medium     Decreased fetal movements in third trimester 10/04/2022     Priority: Medium     COVID-19 affecting pregnancy in second trimester 05/10/2022     Priority: Medium     Nausea and vomiting in pregnancy prior to 22 weeks gestation 2022     Priority: Medium     Low vitamin D level 2022     Priority: Medium     Normal labor and delivery 2019     Priority: Medium     Encounter for triage in pregnant patient 2019     Priority: Medium     Indication for care in labor or delivery 2019     Priority: Medium     GERD (gastroesophageal reflux disease) 2013     Priority: Medium     CARDIOVASCULAR SCREENING; LDL GOAL LESS THAN 160 2013     Priority: Medium          SUBJECTIVE:  Patient is stable  and is tolerating acitivity well  Baby is rooming in  Complications since 2 hours post delivery: None  Pain is well controlled.  Patient is taking pain medications.  Breastfeeding status:initiated   Elimination:  She is voiding without difficulty.  She has had a bowel movement  Desired contraception condoms  Denies heavy bleeding and passing large clots.    INTERVAL HISTORY:  /78 (BP Location: Left arm, Patient Position: Semi-Baldwin's, Cuff Size: Adult Regular)   Pulse 102   Temp 97.8  F (36.6  C) (Oral)   Resp 18   LMP 11/29/2021 (Within Weeks)   SpO2 95%   Breastfeeding Unknown     Constitutional: healthy, alert and no distress    Breasts: Currently breastfeeding    Fundus: Uterine fundus is firm, non-tender and at 1 below the level of the umbilicus    Perineum: Perineum is  well approximated, minimal swelling    Lochia: Lochia is appropriate for the duration of time since delivery.     Postpartum hemoglobin   Hemoglobin   Date Value Ref Range Status   10/31/2022 8.4 (L) 11.7 - 15.7 g/dL Final   02/08/2021 12.8 11.7 - 15.7 g/dL Final     Blood type   Lab Results   Component Value Date    ABO O 07/27/2019       Lab Results   Component Value Date    RH Pos 07/27/2019     Rubella status No results found for: RUBELLAABIGG  History of depression:  no    ASSESSMENT/PLAN:  Normal postpartum course  Stable Post-partum day #2  Complications:none  Asymptomatic for postpartum anemia- plan iron supplementation. Patient has home supply   Postpartum warning s/s reviewed, including bleeding/clots, fever, mastitis & thromboemboli   Exercise, diet and rest reviewed  PP Neel/nabila reviewed  Continue prenatal vitamins while breastfeeding  Birthcontrol planned:Condoms  Educated on postpartum blues and postpartum depression warnings signs/symptoms  Follow-up in 2 and 6 weeks with CNMs at [facility]  Plan d/c home today    Current Discharge Medication List       CONTINUE these medications which have NOT CHANGED    Details   acetaminophen (TYLENOL) 500 MG tablet Take 500-1,000 mg by mouth every 6 hours as needed for mild pain      ferrous sulfate (FEROSUL) 325 (65 Fe) MG tablet Take 1 tablet (325 mg) by mouth daily (with breakfast)  Qty: 90 tablet, Refills: 1    Associated Diagnoses: Anemia affecting pregnancy in second trimester      Prenatal MV-Min-Fe Fum-FA-DHA (PRENATAL 1 PO)       vitamin C (ASCORBIC ACID) 250 MG tablet Take 1 tablet (250 mg) by mouth daily  Qty: 90 tablet, Refills: 1    Associated Diagnoses: Anemia affecting pregnancy in second trimester         STOP taking these medications       ondansetron (ZOFRAN ODT) 4 MG ODT tab Comments:   Reason for Stopping:               WAN Garcia CNM        Discharge Disposition   Discharged to home   Condition at discharge: Stable    Primary Care Physician   Kourtney Junior    Consultations This Hospital Stay   ANESTHESIOLOGY IP CONSULT  LACTATION IP CONSULT    Discharge Orders      Activity    Activity as tolerated     Reason for your hospital stay    Maternity care     Follow Up    Follow up with provider in 2 weeks and 6 weeks for post-delivery checks     Breast pump - Manual/Electric    Breast Pump Documentation:  Manual/Electric Pump: To support adequate breast milk production and nutrition for infant.     I, the undersigned, certify that the above prescribed supplies are medically necessary for this patient and is both reasonable and necessary in reference to accepted standards of medical and necessary in reference to accepted standards of medical practice in the treatment of this patient's condition and is not prescribed as a convenience.     Diet    Resume previous diet     Discharge Medications   Current Discharge Medication List      CONTINUE these medications which have NOT CHANGED    Details   acetaminophen (TYLENOL) 500 MG tablet Take 500-1,000 mg by mouth every 6 hours as needed for  mild pain      ferrous sulfate (FEROSUL) 325 (65 Fe) MG tablet Take 1 tablet (325 mg) by mouth daily (with breakfast)  Qty: 90 tablet, Refills: 1    Associated Diagnoses: Anemia affecting pregnancy in second trimester      Prenatal MV-Min-Fe Fum-FA-DHA (PRENATAL 1 PO)       vitamin C (ASCORBIC ACID) 250 MG tablet Take 1 tablet (250 mg) by mouth daily  Qty: 90 tablet, Refills: 1    Associated Diagnoses: Anemia affecting pregnancy in second trimester         STOP taking these medications       ondansetron (ZOFRAN ODT) 4 MG ODT tab Comments:   Reason for Stopping:             Allergies   No Known Allergies

## 2022-11-01 NOTE — PLAN OF CARE
Patient meeting expected goals. Is up independent in room, meeting all personal and infant needs. VSS. Pain is being managed with Ibuprofen. Breastfeeding is going well (verbal report) and bonding behaviors noted. Patient is stable and will discharge to home later today. Patient is aware to follow up with CNM on 11/17 and in 6 weeks for PP visits. Script for breat pump issued.  Patient declined flu shot.

## 2022-11-01 NOTE — DISCHARGE INSTRUCTIONS
Postpartum Vaginal Delivery Instructions  Follow up on 11/17 and in 6 weeks for Postpartum visits.  Call OB clinic for any concerns.     Activity     Ask family and friends for help when you need it.  Do not place anything in your vagina for 6 weeks.  You are not restricted on other activities, but take it easy for a few weeks to allow your body to recover from delivery.  You are able to do any activities you feel up to that point.  No driving until you have stopped taking your pain medications (usually two weeks after delivery).     Call your health care provider if you have any of these symptoms:     Increased pain, swelling, redness, or fluid around your stiches from an episiotomy or perineal tear.  A fever above 100.4 F (38 C) with or without chills when placing a thermometer under your tongue.  You soak a sanitary pad with blood within 1 hour, or you see blood clots larger than a golf ball.  Bleeding that lasts more than 6 weeks.  Vaginal discharge that smells bad.  Severe pain, cramping or tenderness in your lower belly area.  A need to urinate more frequently (use the toilet more often), more urgently (use the toilet very quickly), or it burns when you urinate.  Nausea and vomiting.  Redness, swelling or pain around a vein in your leg.  Problems breastfeeding or a red or painful area on your breast.  Chest pain and cough or are gasping for air.  Problems coping with sadness, anxiety, or depression.  If you have any concerns about hurting yourself or the baby, call your provider immediately.   You have questions or concerns after you return home.     Keep your hands clean:  Always wash your hands before touching your perineal area and stitches.  This helps reduce your risk of infection.  If your hands aren't dirty, you may use an alcohol hand-rub to clean your hands. Keep your nails clean and short.

## 2022-11-01 NOTE — LACTATION NOTE
Lactation in to see patient before discharge. Baby at breast at the time. Deep latch noted with active sucking and swallows heard. Second baby for Zuleima. States good milk supply with her first. Patient taking a prescription for a pump home. Discussed milk storage, formula use. All questions answered at this time. Home today.

## 2022-11-01 NOTE — PLAN OF CARE
All discharge instructions were reviewed with patient by writer and all questions answered. Patient left unit with infant and all belongings at 1058.

## 2022-11-17 ENCOUNTER — PRENATAL OFFICE VISIT (OUTPATIENT)
Dept: MIDWIFE SERVICES | Facility: CLINIC | Age: 32
End: 2022-11-17
Payer: COMMERCIAL

## 2022-11-17 VITALS
DIASTOLIC BLOOD PRESSURE: 76 MMHG | WEIGHT: 141.7 LBS | SYSTOLIC BLOOD PRESSURE: 118 MMHG | HEIGHT: 61 IN | BODY MASS INDEX: 26.75 KG/M2

## 2022-11-17 PROCEDURE — 99024 POSTOP FOLLOW-UP VISIT: CPT | Performed by: ADVANCED PRACTICE MIDWIFE

## 2022-11-17 NOTE — PROGRESS NOTES
Midwife Postpartum 2 Week Visit    Agustin Ortega is a 31 year old here for a 2 week postpartum checkup.     Delivery date was 10/30/22. She had a  of a viable boy named, Salomón, weight 7 pounds 0.2 oz., with no complications      Since delivery, she has been breast feeding.  She has not had any signs of infection. Her lochia is now lighter than her normal menses.  She has not had other complications.      She is voiding and having bowel movements without difficulty.       Contraception was discussed and patient desires condoms.   She  has not had intercourse since delivery.   She complains of mild  perineal discomfort.     Mood is Stable  Patient screened for postpartum depression.   Depression Rating was:   Last PHQ-9 score on record = No flowsheet data found.  Last GAD7 score on record = No flowsheet data found.  Alcohol Score = 0    ROS:  CONSTITUTIONAL: NEGATIVE for fever, chills, change in weight  BREAST: NEGATIVE for masses, tenderness or discharge  CV: NEGATIVE for chest pain, palpitations or peripheral edema  GI: NEGATIVE for nausea, abdominal pain, heartburn, or change in bowel habits  : NEGATIVE for unusual urinary or vaginal symptoms. Periods are absent.  NEURO: NEGATIVE for weakness, dizziness or paresthesias  HEME/ALLERGY/IMMUNE: NEGATIVE for bleeding problems  PSYCHIATRIC: NEGATIVE for changes in mood or affect      Current Outpatient Medications:      acetaminophen (TYLENOL) 500 MG tablet, Take 500-1,000 mg by mouth every 6 hours as needed for mild pain, Disp: , Rfl:      ferrous sulfate (FEROSUL) 325 (65 Fe) MG tablet, Take 1 tablet (325 mg) by mouth daily (with breakfast), Disp: 90 tablet, Rfl: 1     Prenatal MV-Min-Fe Fum-FA-DHA (PRENATAL 1 PO), , Disp: , Rfl:      vitamin C (ASCORBIC ACID) 250 MG tablet, Take 1 tablet (250 mg) by mouth daily, Disp: 90 tablet, Rfl: 1.   OB History    Para Term  AB Living   2 2 2 0 0 2   SAB IAB Ectopic Multiple Live Births   0 0 0 0 2      #  Outcome Date GA Lbr Tip/2nd Weight Sex Delivery Anes PTL Lv   2 Term 10/30/22 40w1d 04:46 / 00:51 3.18 kg (7 lb 0.2 oz) M Vag-Spont EPI N NEHEMIAS      Name: MACEY LAYNE      Apgar1: 7  Apgar5: 9   1 Term 19 40w4d 04:00 / 02:27 2.835 kg (6 lb 4 oz) M Vag-Spont EPI, Local N NEHEMIAS      Name: MACEY LAYNE      Apgar1: 8  Apgar5: 9     Last pap:    Lab Results   Component Value Date    PAP NIL 2021         EXAM:  LMP 2021 (Within Weeks)   BMI: There is no height or weight on file to calculate BMI.  Exam:  Constitutional: healthy, alert and no distress  Respiratory: negative, Percussion normal. Good diaphragmatic excursion.   Psychiatric: mentation appears normal and affect normal/bright    ASSESSMENT:   Normal postpartum exam after .      ICD-10-CM    1. Routine postpartum follow-up  Z39.2               PLAN:  No results found for any visits on 22.    Return for 6 week postpartum visit.  Teaching: mental health  Family Planning:condoms  Encourage Kegels and abdominal exercise.  Continue a multivitamin/prenatal supplement, especially if breastfeeding.  Postpartum Hgb was not done today.      WAN Boateng, CNM    avs breast feeding  avs contraception of choice

## 2022-11-17 NOTE — NURSING NOTE
"Chief Complaint   Patient presents with     Postpartum Care      10/30/2022 of a boy named Salomón       Initial /76 (BP Location: Left arm, Cuff Size: Adult Regular)   Ht 1.549 m (5' 1\")   Wt 64.3 kg (141 lb 11.2 oz)   LMP 2021 (Within Weeks)   Breastfeeding Yes   BMI 26.77 kg/m   Estimated body mass index is 26.77 kg/m  as calculated from the following:    Height as of this encounter: 1.549 m (5' 1\").    Weight as of this encounter: 64.3 kg (141 lb 11.2 oz).  BP completed using cuff size: regular    Questioned patient about current smoking habits.  Pt. has never smoked.        "

## 2022-12-15 ENCOUNTER — PRENATAL OFFICE VISIT (OUTPATIENT)
Dept: MIDWIFE SERVICES | Facility: CLINIC | Age: 32
End: 2022-12-15
Payer: COMMERCIAL

## 2022-12-15 VITALS — SYSTOLIC BLOOD PRESSURE: 114 MMHG | DIASTOLIC BLOOD PRESSURE: 81 MMHG

## 2022-12-15 DIAGNOSIS — N89.8 VAGINAL ODOR: ICD-10-CM

## 2022-12-15 DIAGNOSIS — Z86.2: ICD-10-CM

## 2022-12-15 LAB
ERYTHROCYTE [DISTWIDTH] IN BLOOD BY AUTOMATED COUNT: 14 % (ref 10–15)
HCT VFR BLD AUTO: 35.7 % (ref 35–47)
HGB BLD-MCNC: 11.1 G/DL (ref 11.7–15.7)
MCH RBC QN AUTO: 27 PG (ref 26.5–33)
MCHC RBC AUTO-ENTMCNC: 31.1 G/DL (ref 31.5–36.5)
MCV RBC AUTO: 87 FL (ref 78–100)
PLATELET # BLD AUTO: 186 10E3/UL (ref 150–450)
RBC # BLD AUTO: 4.11 10E6/UL (ref 3.8–5.2)
WBC # BLD AUTO: 3.7 10E3/UL (ref 4–11)

## 2022-12-15 PROCEDURE — 99207 PR POST PARTUM EXAM: CPT | Performed by: ADVANCED PRACTICE MIDWIFE

## 2022-12-15 PROCEDURE — 36415 COLL VENOUS BLD VENIPUNCTURE: CPT | Performed by: ADVANCED PRACTICE MIDWIFE

## 2022-12-15 PROCEDURE — 85027 COMPLETE CBC AUTOMATED: CPT | Performed by: ADVANCED PRACTICE MIDWIFE

## 2022-12-15 NOTE — PROGRESS NOTES
Midwife Postpartum 6 Week Visit    Agustin Ortega is a 32 year old here for a postpartum checkup.     Delivery date was 10/30/22. She had a  of a viable boy, weight 7 pounds 0.2 oz., with no complications      Since delivery, she has been breast feeding.  She has not had any signs of infection, her lochia stopped after 1, 3 weeks.  She has not had other complications. Had a history gestational thrombocytopenia    She is voiding and having bowel movements without difficulty.   She reports a vaginal odor that she is not able to describe, that goes away after taking a shower. She declines any vaginal irritation or discharge      Contraception was discussed and patient desires condoms.   She  has not had intercourse since delivery.   She complains of No  perineal discomfort.     Mood is Stable  Patient screened for postpartum depression.   Depression Rating was:   Last PHQ-9 score on record = No flowsheet data found.  Last GAD7 score on record = No flowsheet data found.  Alcohol Score = 0    ROS:  CONSTITUTIONAL: NEGATIVE for fever, chills, change in weight  INTEGUMENTARU/SKIN: NEGATIVE for worrisome rashes, moles or lesions  EYES: NEGATIVE for vision changes or irritation  ENT: NEGATIVE for ear, mouth and throat problems  RESP: NEGATIVE for significant cough or SOB  BREAST: NEGATIVE for masses, tenderness or discharge  CV: NEGATIVE for chest pain, palpitations or peripheral edema  GI: NEGATIVE for nausea, abdominal pain, heartburn, or change in bowel habits  : NEGATIVE for unusual urinary symptoms.POSITIVE for vaginal odor, unable to describe. Periods are regular.  MUSCULOSKELETAL: NEGATIVE for significant arthralgias or myalgia  NEURO: NEGATIVE for weakness, dizziness or paresthesias  PSYCHIATRIC: NEGATIVE for changes in mood or affect      Current Outpatient Medications:      ferrous sulfate (FEROSUL) 325 (65 Fe) MG tablet, Take 1 tablet (325 mg) by mouth daily (with breakfast), Disp: 90 tablet, Rfl: 1      ibuprofen (ADVIL/MOTRIN) 100 MG tablet, Take 100 mg by mouth every 4 hours as needed, Disp: , Rfl:      Prenatal MV-Min-Fe Fum-FA-DHA (PRENATAL 1 PO), , Disp: , Rfl:      vitamin C (ASCORBIC ACID) 250 MG tablet, Take 1 tablet (250 mg) by mouth daily, Disp: 90 tablet, Rfl: 1.   OB History    Para Term  AB Living   2 2 2 0 0 2   SAB IAB Ectopic Multiple Live Births   0 0 0 0 2      # Outcome Date GA Lbr Tip/2nd Weight Sex Delivery Anes PTL Lv   2 Term 10/30/22 40w1d 04:46 / 00:51 3.18 kg (7 lb 0.2 oz) M Vag-Spont EPI N NEHEMIAS      Name: MACEY LAYNE      Apgar1: 7  Apgar5: 9   1 Term 19 40w4d 04:00 / 02:27 2.835 kg (6 lb 4 oz) M Vag-Spont EPI, Local N NEHEMIAS      Name: MACEY LAYNE      Apgar1: 8  Apgar5: 9     Last pap:    Lab Results   Component Value Date    PAP NIL 2021     Hgb in hospital was 10.8, patient is taking PNV    EXAM:  LMP 2021 (Within Weeks)   BMI: There is no height or weight on file to calculate BMI.  Exam:  Constitutional: healthy, alert and no distress  Head: Normocephalic. No masses, lesions, tenderness or abnormalities  Neck: Neck supple. No adenopathy. Thyroid symmetric, normal size,, Carotids without bruits.  ENT: ENT exam normal, no neck nodes or sinus tenderness  Cardiovascular: negative, PMI normal. No lifts, heaves, or thrills. RRR. No murmurs, clicks gallops or rub  Respiratory: negative, Percussion normal. Good diaphragmatic excursion. Lungs clear  Breasts: . Deferred as patient is lactating  Gastrointestinal: Abdomen soft, non-tender. BS normal. No masses, organomegaly    Musculoskeletal: extremities normal- no gross deformities noted, gait normal and normal muscle tone  Skin: no suspicious lesions or rashes  Neurologic: Gait normal. Reflexes normal and symmetric. Sensation grossly WNL.  Psychiatric: mentation appears normal and affect normal/bright  Hematologic/Lymphatic/Immunologic: Normal cervical lymph nodes  PELVIC EXAM:  Vulva: No lesions,  well healed, BUS WNL, no tenderness  Vagina: Moist, pink, discharge normal  well rugated, no lesions  Cervix:smooth, pink, no visible lesions  Uterus: Involuted to normal size, non-tender, no masses palpated  Ovaries: No masses palpated  Rectal exam: deferred      ASSESSMENT:   Normal postpartum exam after .    ICD-10-CM    1. Routine postpartum follow-up  Z39.2       2. Hx of transient thrombocytopenia  Z86.2 CBC with platelets      3. Vaginal odor  N89.8             PLAN:  1. Routine postpartum follow up  Return as needed or at time of next expected pap, pelvic, or breast exam.  Teaching: exercise and birth control  Family Planning:condoms  Encourage Kegels and abdominal exercise.  Continue a multivitamin/prenatal supplement, especially if breastfeeding.  Pap smear was not obtained today.  Postpartum Hgb was done today.    2. History of thrombocytopenia  CBC with platelets ordered today    3. Vaginal odor  Patient advised to seek care if she develops any itchiness, irritation,discharge of if the vaginal odor becomes worrisome for her    GDM:  Fasting and 2hr GCT needed:  No  If yes, remind need for annual fasting BG and nutrition/exercise recommendations.  Follow up in 1 year for her annual physical examination        Marnie Washington, student [employer]    I was present with the student who participated in the service and documentation of the services provided. I have verified the history and personally performed the physical exam and medical decision making as documented by the student and edited by me.  WAN Boateng CNM 12/15/2022 10:06 AM

## 2023-04-29 ENCOUNTER — HEALTH MAINTENANCE LETTER (OUTPATIENT)
Age: 33
End: 2023-04-29

## 2024-02-27 ENCOUNTER — OFFICE VISIT (OUTPATIENT)
Dept: URGENT CARE | Facility: URGENT CARE | Age: 34
End: 2024-02-27
Payer: COMMERCIAL

## 2024-02-27 VITALS
RESPIRATION RATE: 20 BRPM | SYSTOLIC BLOOD PRESSURE: 111 MMHG | BODY MASS INDEX: 29.7 KG/M2 | DIASTOLIC BLOOD PRESSURE: 78 MMHG | OXYGEN SATURATION: 100 % | TEMPERATURE: 98.5 F | HEART RATE: 98 BPM | WEIGHT: 157.2 LBS

## 2024-02-27 DIAGNOSIS — J02.0 STREPTOCOCCAL PHARYNGITIS: Primary | ICD-10-CM

## 2024-02-27 DIAGNOSIS — J02.9 SORE THROAT: ICD-10-CM

## 2024-02-27 DIAGNOSIS — R11.2 NAUSEA AND VOMITING, UNSPECIFIED VOMITING TYPE: ICD-10-CM

## 2024-02-27 LAB — DEPRECATED S PYO AG THROAT QL EIA: POSITIVE

## 2024-02-27 PROCEDURE — 87880 STREP A ASSAY W/OPTIC: CPT | Performed by: FAMILY MEDICINE

## 2024-02-27 PROCEDURE — 99213 OFFICE O/P EST LOW 20 MIN: CPT | Performed by: FAMILY MEDICINE

## 2024-02-27 RX ORDER — PENICILLIN V POTASSIUM 500 MG/1
500 TABLET, FILM COATED ORAL 2 TIMES DAILY
Qty: 20 TABLET | Refills: 0 | Status: SHIPPED | OUTPATIENT
Start: 2024-02-27 | End: 2024-03-08

## 2024-02-27 RX ORDER — ONDANSETRON 4 MG/1
4 TABLET, ORALLY DISINTEGRATING ORAL EVERY 8 HOURS PRN
Qty: 12 TABLET | Refills: 0 | Status: SHIPPED | OUTPATIENT
Start: 2024-02-27

## 2024-02-28 NOTE — PROGRESS NOTES
SUBJECTIVE:   Agustin Ortega is a 33 year old female presenting with a chief complaint of sore throat, vomiting, dizzy.  Headache, nausea  Onset of symptoms was 5 day(s) ago.  Course of illness is waxing and waning.    Severity moderate  Current and Associated symptoms: sore throat, vomiting, nausea, headache  Treatment measures tried include Tylenol/Ibuprofen, Fluids, and Rest.  Predisposing factors include recent illness.    Had recent GI viral illness in family, was improving but started to get sick again.  No known positive strep exposure.    Past Medical History:   Diagnosis Date    No pertinent past medical history 03/02/2022     Current Outpatient Medications   Medication Sig Dispense Refill    DM-Phenylephrine-Acetaminophen (VICKS DAYQUIL MULTI-SYMPTOM PO)       ferrous sulfate (FEROSUL) 325 (65 Fe) MG tablet Take 1 tablet (325 mg) by mouth daily (with breakfast) 90 tablet 1    ibuprofen (ADVIL/MOTRIN) 100 MG tablet Take 100 mg by mouth every 4 hours as needed      vitamin C (ASCORBIC ACID) 250 MG tablet Take 1 tablet (250 mg) by mouth daily 90 tablet 1    VITAMIN D PO        Social History     Tobacco Use    Smoking status: Never    Smokeless tobacco: Never   Substance Use Topics    Alcohol use: No       ROS:  Review of systems negative except as stated above.    OBJECTIVE:  /78 (BP Location: Right arm, Patient Position: Sitting, Cuff Size: Adult Large)   Pulse 98   Temp 98.5  F (36.9  C) (Oral)   Resp 20   Wt 71.3 kg (157 lb 3.2 oz)   LMP 02/20/2024 (Approximate)   SpO2 100%   Breastfeeding Yes   BMI 29.70 kg/m    GENERAL APPEARANCE: healthy, alert and no distress  EYES: EOMI,  PERRL, conjunctiva clear  HENT: mouth without ulcers, erythema or lesions, pharynx mild pink, no exudates, uvula midline  RESP: lungs with no audible wheezes or increase work of breathing  PSYCH: mentation appears normal and affect normal/bright    Results for orders placed or performed in visit on 02/27/24    Streptococcus A Rapid Screen w/Reflex to PCR - Clinic Collect     Status: Abnormal    Specimen: Throat; Swab   Result Value Ref Range    Group A Strep antigen Positive (A) Negative         ASSESSMENT/PLAN:  (J02.0) Streptococcal pharyngitis  (primary encounter diagnosis)  Plan: penicillin V (VEETID) 500 MG tablet            (J02.9) Sore throat  Plan: Streptococcus A Rapid Screen w/Reflex to PCR -         Clinic Collect            (R11.2) Nausea and vomiting, unspecified vomiting type  Plan: ondansetron (ZOFRAN ODT) 4 MG ODT tab              Reassurance given, reviewed symptomatic treatment with tylenol, ibuprofen, plenty of fluids and rest.  RX Pen V given for treatment of strep, reviewed that is still contagious for the next 24-48 hours while on antibiotic, obtain new toothbrush in 2 days.  RX zofran given to help with nausea and vomiting symptoms, reviewed that GI symptoms can occur with strep infection too.    Follow up with primary provider if no improvement of symptoms in 1 week    Nehemiah Lala MD  February 27, 2024 6:47 PM

## 2024-07-07 ENCOUNTER — HEALTH MAINTENANCE LETTER (OUTPATIENT)
Age: 34
End: 2024-07-07

## 2025-07-02 ENCOUNTER — OFFICE VISIT (OUTPATIENT)
Dept: INTERNAL MEDICINE | Facility: CLINIC | Age: 35
End: 2025-07-02
Payer: COMMERCIAL

## 2025-07-02 ENCOUNTER — MYC MEDICAL ADVICE (OUTPATIENT)
Dept: INTERNAL MEDICINE | Facility: CLINIC | Age: 35
End: 2025-07-02

## 2025-07-02 VITALS
DIASTOLIC BLOOD PRESSURE: 64 MMHG | HEIGHT: 61 IN | WEIGHT: 193 LBS | RESPIRATION RATE: 14 BRPM | TEMPERATURE: 97.6 F | BODY MASS INDEX: 36.44 KG/M2 | SYSTOLIC BLOOD PRESSURE: 102 MMHG | HEART RATE: 92 BPM | OXYGEN SATURATION: 100 %

## 2025-07-02 DIAGNOSIS — Z86.2 HISTORY OF ANEMIA: ICD-10-CM

## 2025-07-02 DIAGNOSIS — R79.89 LOW VITAMIN D LEVEL: ICD-10-CM

## 2025-07-02 DIAGNOSIS — Z00.00 ROUTINE GENERAL MEDICAL EXAMINATION AT A HEALTH CARE FACILITY: Primary | ICD-10-CM

## 2025-07-02 DIAGNOSIS — Z12.4 SCREENING FOR MALIGNANT NEOPLASM OF CERVIX: ICD-10-CM

## 2025-07-02 LAB
ERYTHROCYTE [DISTWIDTH] IN BLOOD BY AUTOMATED COUNT: 13 % (ref 10–15)
HCT VFR BLD AUTO: 35.2 % (ref 35–47)
HGB BLD-MCNC: 12.1 G/DL (ref 11.7–15.7)
MCH RBC QN AUTO: 27.6 PG (ref 26.5–33)
MCHC RBC AUTO-ENTMCNC: 34.4 G/DL (ref 31.5–36.5)
MCV RBC AUTO: 80 FL (ref 78–100)
PLATELET # BLD AUTO: 195 10E3/UL (ref 150–450)
RBC # BLD AUTO: 4.38 10E6/UL (ref 3.8–5.2)
WBC # BLD AUTO: 4.3 10E3/UL (ref 4–11)

## 2025-07-02 PROCEDURE — 82728 ASSAY OF FERRITIN: CPT | Performed by: INTERNAL MEDICINE

## 2025-07-02 PROCEDURE — 36415 COLL VENOUS BLD VENIPUNCTURE: CPT | Performed by: INTERNAL MEDICINE

## 2025-07-02 PROCEDURE — 99395 PREV VISIT EST AGE 18-39: CPT | Performed by: INTERNAL MEDICINE

## 2025-07-02 PROCEDURE — 80061 LIPID PANEL: CPT | Performed by: INTERNAL MEDICINE

## 2025-07-02 PROCEDURE — G0145 SCR C/V CYTO,THINLAYER,RESCR: HCPCS | Performed by: INTERNAL MEDICINE

## 2025-07-02 PROCEDURE — 83540 ASSAY OF IRON: CPT | Performed by: INTERNAL MEDICINE

## 2025-07-02 PROCEDURE — 80053 COMPREHEN METABOLIC PANEL: CPT | Performed by: INTERNAL MEDICINE

## 2025-07-02 PROCEDURE — 82306 VITAMIN D 25 HYDROXY: CPT | Performed by: INTERNAL MEDICINE

## 2025-07-02 PROCEDURE — 82607 VITAMIN B-12: CPT | Performed by: INTERNAL MEDICINE

## 2025-07-02 PROCEDURE — 85027 COMPLETE CBC AUTOMATED: CPT | Performed by: INTERNAL MEDICINE

## 2025-07-02 PROCEDURE — 3049F LDL-C 100-129 MG/DL: CPT | Performed by: INTERNAL MEDICINE

## 2025-07-02 PROCEDURE — 83550 IRON BINDING TEST: CPT | Performed by: INTERNAL MEDICINE

## 2025-07-02 PROCEDURE — 82746 ASSAY OF FOLIC ACID SERUM: CPT | Performed by: INTERNAL MEDICINE

## 2025-07-02 PROCEDURE — 3074F SYST BP LT 130 MM HG: CPT | Performed by: INTERNAL MEDICINE

## 2025-07-02 PROCEDURE — 3078F DIAST BP <80 MM HG: CPT | Performed by: INTERNAL MEDICINE

## 2025-07-02 PROCEDURE — 87624 HPV HI-RISK TYP POOLED RSLT: CPT | Performed by: INTERNAL MEDICINE

## 2025-07-02 PROCEDURE — 84443 ASSAY THYROID STIM HORMONE: CPT | Performed by: INTERNAL MEDICINE

## 2025-07-02 SDOH — HEALTH STABILITY: PHYSICAL HEALTH: ON AVERAGE, HOW MANY DAYS PER WEEK DO YOU ENGAGE IN MODERATE TO STRENUOUS EXERCISE (LIKE A BRISK WALK)?: 0 DAYS

## 2025-07-02 ASSESSMENT — SOCIAL DETERMINANTS OF HEALTH (SDOH): HOW OFTEN DO YOU GET TOGETHER WITH FRIENDS OR RELATIVES?: ONCE A WEEK

## 2025-07-02 NOTE — NURSING NOTE
"Chief Complaint   Patient presents with    Physical     fasting     initial /64   Pulse 92   Temp 97.6  F (36.4  C) (Tympanic)   Resp 14   Ht 1.549 m (5' 1\")   Wt 87.5 kg (193 lb)   LMP 05/21/2025 (Approximate)   SpO2 100%   Breastfeeding No   BMI 36.47 kg/m   Estimated body mass index is 36.47 kg/m  as calculated from the following:    Height as of this encounter: 1.549 m (5' 1\").    Weight as of this encounter: 87.5 kg (193 lb)..  bp completed using cuff size large  ROBYN GUERRA LPN  "

## 2025-07-02 NOTE — PROGRESS NOTES
Dr Polo's note    Patient's instructions / PLAN:                                                      ***  Plan:   Labs today - [address]   2.  3.  4.  5.  6.  Continue same meds, same doses for now  Continue the other meds, same doses for now.  Please make a lab appointment for fasting labs in { :122299}  Follow up a week after the labs      ASSESSMENT & PLAN:                                                      1. ***  2.  3.  4.  5.  6.    Chief Complaint:                                                        Annual exam  Follow up chronic medical problems      SUBJECTIVE:                                                    History of present illness     We reviewed the chronic medical problems as above.   I reviewed the recent tests results in Epic     Maternal grandmother -- breast Ca -- in her ^0s    ROS:                                                      ROS: negative for fever, chills, cough, wheezes, chest pain, shortness of breath, vomiting, abdominal pain, leg swelling     PMHx: - reviewed  Past Medical History:   Diagnosis Date    No pertinent past medical history 03/02/2022       PSHx: reviewed  Past Surgical History:   Procedure Laterality Date    ENT SURGERY      wisdom teeth  2011        Soc Hx: No daily alcohol, no smoking  Social History     Socioeconomic History    Marital status:      Spouse name: Timi    Number of children: Not on file    Years of education: Not on file    Highest education level: Not on file   Occupational History    Occupation: [employer]   Tobacco Use    Smoking status: Never    Smokeless tobacco: Never   Vaping Use    Vaping status: Never Used   Substance and Sexual Activity    Alcohol use: No    Drug use: No    Sexual activity: Yes     Partners: Male   Other Topics Concern    Parent/sibling w/ CABG, MI or angioplasty before 65F 55M? No   Social History Narrative    Not on file     Social Drivers of Health     Financial Resource Strain: Low Risk  (7/2/2025)     Financial Resource Strain     Within the past 12 months, have you or your family members you live with been unable to get utilities (heat, electricity) when it was really needed?: No   Food Insecurity: Low Risk  (7/2/2025)    Food Insecurity     Within the past 12 months, did you worry that your food would run out before you got money to buy more?: No     Within the past 12 months, did the food you bought just not last and you didn t have money to get more?: No   Transportation Needs: Low Risk  (7/2/2025)    Transportation Needs     Within the past 12 months, has lack of transportation kept you from medical appointments, getting your medicines, non-medical meetings or appointments, work, or from getting things that you need?: No   Physical Activity: Unknown (7/2/2025)    Exercise Vital Sign     Days of Exercise per Week: 0 days     Minutes of Exercise per Session: Not on file   Stress: Stress Concern Present (7/2/2025)    [place] [employer] - Occupational Stress Questionnaire     Feeling of Stress : To some extent   Social Connections: Unknown (7/2/2025)    Social Connection and Isolation Panel [NHANES]     Frequency of Communication with Friends and Family: Not on file     Frequency of Social Gatherings with Friends and Family: Once a week     Attends Restorationist Services: Not on file     Active Member of Clubs or Organizations: Not on file     Attends Club or Organization Meetings: Not on file     Marital Status: Not on file   Interpersonal Safety: Low Risk  (7/2/2025)    Interpersonal Safety     Do you feel physically and emotionally safe where you currently live?: Yes     Within the past 12 months, have you been hit, slapped, kicked or otherwise physically hurt by someone?: No     Within the past 12 months, have you been humiliated or emotionally abused in other ways by your partner or ex-partner?: No   Housing Stability: Low Risk  (7/2/2025)    Housing Stability     Do you have housing? :  "Yes     Are you worried about losing your housing?: No        Fam Hx: reviewed  Family History   Problem Relation Age of Onset    Diabetes Father     Hypertension Father     Hyperlipidemia Father     Cerebrovascular Disease Father 60    Cancer Maternal Grandmother     Breast Cancer Maternal Grandmother     Cancer Maternal Grandfather     Other Cancer Maternal Grandfather     Diabetes Brother 31        Type 2         Screening: reviewed      All: reviewed    Meds: reviewed  Current Outpatient Medications   Medication Sig Dispense Refill    DM-Phenylephrine-Acetaminophen (VICKS DAYQUIL MULTI-SYMPTOM PO)       ferrous sulfate (FEROSUL) 325 (65 Fe) MG tablet Take 1 tablet (325 mg) by mouth daily (with breakfast) 90 tablet 1    ibuprofen (ADVIL/MOTRIN) 100 MG tablet Take 100 mg by mouth every 4 hours as needed      ondansetron (ZOFRAN ODT) 4 MG ODT tab Take 1 tablet (4 mg) by mouth every 8 hours as needed for nausea or vomiting 12 tablet 0    vitamin C (ASCORBIC ACID) 250 MG tablet Take 1 tablet (250 mg) by mouth daily 90 tablet 1    VITAMIN D PO          OBJECTIVE:                                                    Physical Exam :  Blood pressure 102/64, pulse 92, temperature 97.6  F (36.4  C), temperature source Tympanic, resp. rate 14, height 1.549 m (5' 1\"), weight 87.5 kg (193 lb), last menstrual period 05/21/2025, SpO2 100%, not currently breastfeeding.   ***  NAD, appears comfortable  Skin clear, no rashes  HEENT: PERRLA, EOMI, anicteric sclera, pink conjunctiva, external ears appear normal, bilateral tympanic membranes clinically normal, oropharynx normal color. ***  Neck: supple, no JVD,  no thyroidmegaly  Lymph nodes non palpable in the cervical, supraclavicular axillaries,   Chest: clear to auscultation with good respiratory effort  Cardiac: S1S2, RRR, no mgr appreciated  Abdomen: soft, not tender, not distended, audible bowel sound, no hepatosplenomegaly, no palpable masses, no abdominal bruits  Extremities: " no cyanosis, clubbing or edema.   Neuro: A, Ox3, no focal signs.  Breast exam {BREAST EXAM:265562}    Pelvic exam: { :408877} ***      *** Patient has been advised of split billing requirements and indicates understanding: Yes.  At the check in, at the  ***    Appropriate preventive services were discussed with this patient, including applicable screening as appropriate for   -- vaccines  -- nutrition,   -- physical activity   -- fall prevention,  -- keeping a healthy weight  -- cognition -- see below MME  -- Tobacco-use cessation, -- not smoker     Kourtney Polo MD  Internal Medicine       #####################    Preventive Care Visit  [facility]  Kourtney Junior MD, Internal Medicine  Jul 2, 2025  {Provider  Link to SmartSet :764632}    {PROVIDER CHARTING PREFERENCE:726567}    Gila Dewey is a 34 year old, presenting for the following:  Physical (fasting)        7/2/2025     9:12 AM   Additional Questions   Roomed by Bonny GABRIEL          HPI  ***   {MA/LPN/RN Pre-Provider Visit Orders- hCG/UA/Strep (Optional):899485}  {SUPERLIST (Optional):415576}  {additonal problems for provider to add (Optional):157188}  Advance Care Planning  {The storyboard will display whether the patient has ACP docs on file. Hover over the Code section in the storyboard to access the ACP documents. :629979}  Discussed advance care planning with patient; informed AVS has link to [facility] Cho[facility].        7/2/2025   General Health   How would you rate your overall physical health? (!) FAIR   Feel stress (tense, anxious, or unable to sleep) To some extent   (!) STRESS CONCERN      7/2/2025   Nutrition   Three or more servings of calcium each day? (!) NO   Diet: Regular (no restrictions)   How many servings of fruit and vegetables per day? (!) 0-1   How many sweetened beverages each day? (!) 3         7/2/2025   Exercise   Days per week of moderate/strenous exercise 0 days   (!)  EXERCISE CONCERN      7/2/2025   Social Factors   Frequency of gathering with friends or relatives Once a week   Worry food won't last until get money to buy more No   Food not last or not have enough money for food? No   Do you have housing? (Housing is defined as stable permanent housing and does not include staying outside in a car, in a tent, in an abandoned building, in an overnight shelter, or couch-surfing.) Yes   Are you worried about losing your housing? No   Lack of transportation? No   Unable to get utilities (heat,electricity)? No         7/2/2025   Dental   Dentist two times every year? Yes         Today's PHQ-2 Score:       7/2/2025     9:06 AM   PHQ-2 ( 1999 [employer])   Q1: Little interest or pleasure in doing things 0   Q2: Feeling down, depressed or hopeless 0   PHQ-2 Score 0    Q1: Little interest or pleasure in doing things Not at all   Q2: Feeling down, depressed or hopeless Not at all   PHQ-2 Score 0       Patient-reported           7/2/2025   Substance Use   Alcohol more than 3/day or more than 7/wk Not Applicable   Do you use any other substances recreationally? No     Social History     Tobacco Use    Smoking status: Never    Smokeless tobacco: Never   Vaping Use    Vaping status: Never Used   Substance Use Topics    Alcohol use: No    Drug use: No     {Provider  If there are gaps in the social history shown above, please follow the link to update and then refresh the note Link to Social and Substance History :455275}     {Mammogram Decision Support (Optional):442786}        7/2/2025   STI Screening   New sexual partner(s) since last STI/HIV test? No     History of abnormal Pap smear: { :697008}        Latest Ref Rng & Units 2/8/2021     9:57 AM 2/8/2021     9:37 AM 4/2/2018     8:15 AM   PAP / HPV   PAP (Historical)   NIL  NIL    HPV 16 DNA NEG^Negative Negative      HPV 18 DNA NEG^Negative Negative      Other HR HPV NEG^Negative Negative              7/2/2025   Contraception/Family Planning  "  Questions about contraception or family planning No   What are your periods like? (!) IRREGULAR     {Provider  REQUIRED FOR AWV Use the storyboard to review patient history, after sections have been marked as reviewed, refresh note to capture documentation:246255}   Reviewed and updated as needed this visit by Provider                    {HISTORY OPTIONS (Optional):573701}    {ROS Picklists (Optional):979762}     Objective    Exam  /64   Pulse 92   Temp 97.6  F (36.4  C) (Tympanic)   Resp 14   Ht 1.549 m (5' 1\")   Wt 87.5 kg (193 lb)   LMP 05/21/2025 (Approximate)   SpO2 100%   Breastfeeding No   BMI 36.47 kg/m     Estimated body mass index is 36.47 kg/m  as calculated from the following:    Height as of this encounter: 1.549 m (5' 1\").    Weight as of this encounter: 87.5 kg (193 lb).    Physical Exam  {Exam Choices (Optional):601334}        Signed Electronically by: Kourtney Junior MD  {Email feedback regarding this note to [email or web address]   :739451}  "

## 2025-07-03 LAB
ALBUMIN SERPL BCG-MCNC: 4.4 G/DL (ref 3.5–5.2)
ALP SERPL-CCNC: 69 U/L (ref 40–150)
ALT SERPL W P-5'-P-CCNC: 11 U/L (ref 0–50)
ANION GAP SERPL CALCULATED.3IONS-SCNC: 11 MMOL/L (ref 7–15)
AST SERPL W P-5'-P-CCNC: 20 U/L (ref 0–45)
BILIRUB SERPL-MCNC: 0.4 MG/DL
BUN SERPL-MCNC: 9.4 MG/DL (ref 6–20)
CALCIUM SERPL-MCNC: 9.4 MG/DL (ref 8.8–10.4)
CHLORIDE SERPL-SCNC: 105 MMOL/L (ref 98–107)
CHOLEST SERPL-MCNC: 174 MG/DL
CREAT SERPL-MCNC: 0.48 MG/DL (ref 0.51–0.95)
EGFRCR SERPLBLD CKD-EPI 2021: >90 ML/MIN/1.73M2
FASTING STATUS PATIENT QL REPORTED: YES
FASTING STATUS PATIENT QL REPORTED: YES
FERRITIN SERPL-MCNC: 132 NG/ML (ref 6–175)
FOLATE SERPL-MCNC: 18.6 NG/ML (ref 4.6–34.8)
GLUCOSE SERPL-MCNC: 88 MG/DL (ref 70–99)
HCO3 SERPL-SCNC: 24 MMOL/L (ref 22–29)
HDLC SERPL-MCNC: 52 MG/DL
HPV HR 12 DNA CVX QL NAA+PROBE: NEGATIVE
HPV16 DNA CVX QL NAA+PROBE: NEGATIVE
HPV18 DNA CVX QL NAA+PROBE: NEGATIVE
HUMAN PAPILLOMA VIRUS FINAL DIAGNOSIS: NORMAL
IRON BINDING CAPACITY (ROCHE): 222 UG/DL (ref 240–430)
IRON SATN MFR SERPL: 28 % (ref 15–46)
IRON SERPL-MCNC: 63 UG/DL (ref 37–145)
LDLC SERPL CALC-MCNC: 112 MG/DL
NONHDLC SERPL-MCNC: 122 MG/DL
POTASSIUM SERPL-SCNC: 4 MMOL/L (ref 3.4–5.3)
PROT SERPL-MCNC: 7.3 G/DL (ref 6.4–8.3)
SODIUM SERPL-SCNC: 140 MMOL/L (ref 135–145)
TRIGL SERPL-MCNC: 52 MG/DL
TSH SERPL DL<=0.005 MIU/L-ACNC: 1.33 UIU/ML (ref 0.3–4.2)
VIT B12 SERPL-MCNC: 297 PG/ML (ref 232–1245)
VIT D+METAB SERPL-MCNC: 20 NG/ML (ref 20–50)

## 2025-07-06 ENCOUNTER — MYC MEDICAL ADVICE (OUTPATIENT)
Dept: INTERNAL MEDICINE | Facility: CLINIC | Age: 35
End: 2025-07-06
Payer: COMMERCIAL

## 2025-07-09 LAB
BKR AP ASSOCIATED HPV REPORT: NORMAL
BKR LAB AP GYN ADEQUACY: NORMAL
BKR LAB AP GYN INTERPRETATION: NORMAL
BKR LAB AP LMP: NORMAL
BKR LAB AP PREVIOUS ABNORMAL: NORMAL
PATH REPORT.COMMENTS IMP SPEC: NORMAL
PATH REPORT.COMMENTS IMP SPEC: NORMAL
PATH REPORT.RELEVANT HX SPEC: NORMAL
